# Patient Record
Sex: FEMALE | Race: WHITE | NOT HISPANIC OR LATINO | Employment: OTHER | URBAN - NONMETROPOLITAN AREA
[De-identification: names, ages, dates, MRNs, and addresses within clinical notes are randomized per-mention and may not be internally consistent; named-entity substitution may affect disease eponyms.]

---

## 2017-05-15 ENCOUNTER — HOSPITAL ENCOUNTER (OUTPATIENT)
Dept: LAB | Facility: MEDICAL CENTER | Age: 72
End: 2017-05-15
Attending: FAMILY MEDICINE
Payer: MEDICARE

## 2017-05-15 DIAGNOSIS — E55.9 VITAMIN D DEFICIENCY DISEASE: ICD-10-CM

## 2017-05-15 DIAGNOSIS — D70.9 CHRONIC NEUTROPENIA (HCC): ICD-10-CM

## 2017-05-15 DIAGNOSIS — E78.00 PURE HYPERCHOLESTEROLEMIA: Chronic | ICD-10-CM

## 2017-05-15 LAB
25(OH)D3 SERPL-MCNC: 49 NG/ML (ref 30–100)
ALBUMIN SERPL BCP-MCNC: 4 G/DL (ref 3.2–4.9)
ALBUMIN/GLOB SERPL: 1.3 G/DL
ALP SERPL-CCNC: 72 U/L (ref 30–99)
ALT SERPL-CCNC: 15 U/L (ref 2–50)
ANION GAP SERPL CALC-SCNC: 7 MMOL/L (ref 0–11.9)
AST SERPL-CCNC: 15 U/L (ref 12–45)
BASOPHILS # BLD AUTO: 0.9 % (ref 0–1.8)
BASOPHILS # BLD: 0.04 K/UL (ref 0–0.12)
BILIRUB SERPL-MCNC: 0.6 MG/DL (ref 0.1–1.5)
BUN SERPL-MCNC: 19 MG/DL (ref 8–22)
CALCIUM SERPL-MCNC: 9.8 MG/DL (ref 8.5–10.5)
CHLORIDE SERPL-SCNC: 104 MMOL/L (ref 96–112)
CHOLEST SERPL-MCNC: 186 MG/DL (ref 100–199)
CO2 SERPL-SCNC: 27 MMOL/L (ref 20–33)
CREAT SERPL-MCNC: 0.77 MG/DL (ref 0.5–1.4)
EOSINOPHIL # BLD AUTO: 0.1 K/UL (ref 0–0.51)
EOSINOPHIL NFR BLD: 2.3 % (ref 0–6.9)
ERYTHROCYTE [DISTWIDTH] IN BLOOD BY AUTOMATED COUNT: 48.9 FL (ref 35.9–50)
GFR SERPL CREATININE-BSD FRML MDRD: >60 ML/MIN/1.73 M 2
GLOBULIN SER CALC-MCNC: 3.1 G/DL (ref 1.9–3.5)
GLUCOSE SERPL-MCNC: 95 MG/DL (ref 65–99)
HCT VFR BLD AUTO: 39.4 % (ref 37–47)
HDLC SERPL-MCNC: 89 MG/DL
HGB BLD-MCNC: 12.9 G/DL (ref 12–16)
IMM GRANULOCYTES # BLD AUTO: 0.01 K/UL (ref 0–0.11)
IMM GRANULOCYTES NFR BLD AUTO: 0.2 % (ref 0–0.9)
LDLC SERPL CALC-MCNC: 83 MG/DL
LYMPHOCYTES # BLD AUTO: 1.77 K/UL (ref 1–4.8)
LYMPHOCYTES NFR BLD: 40.8 % (ref 22–41)
MCH RBC QN AUTO: 31.5 PG (ref 27–33)
MCHC RBC AUTO-ENTMCNC: 32.7 G/DL (ref 33.6–35)
MCV RBC AUTO: 96.1 FL (ref 81.4–97.8)
MONOCYTES # BLD AUTO: 0.26 K/UL (ref 0–0.85)
MONOCYTES NFR BLD AUTO: 6 % (ref 0–13.4)
NEUTROPHILS # BLD AUTO: 2.16 K/UL (ref 2–7.15)
NEUTROPHILS NFR BLD: 49.8 % (ref 44–72)
NRBC # BLD AUTO: 0 K/UL
NRBC BLD AUTO-RTO: 0 /100 WBC
PLATELET # BLD AUTO: 166 K/UL (ref 164–446)
PMV BLD AUTO: 10.5 FL (ref 9–12.9)
POTASSIUM SERPL-SCNC: 4.1 MMOL/L (ref 3.6–5.5)
PROT SERPL-MCNC: 7.1 G/DL (ref 6–8.2)
RBC # BLD AUTO: 4.1 M/UL (ref 4.2–5.4)
SODIUM SERPL-SCNC: 138 MMOL/L (ref 135–145)
TRIGL SERPL-MCNC: 70 MG/DL (ref 0–149)
WBC # BLD AUTO: 4.3 K/UL (ref 4.8–10.8)

## 2017-05-15 PROCEDURE — 85025 COMPLETE CBC W/AUTO DIFF WBC: CPT

## 2017-05-15 PROCEDURE — 80061 LIPID PANEL: CPT

## 2017-05-15 PROCEDURE — 36415 COLL VENOUS BLD VENIPUNCTURE: CPT

## 2017-05-15 PROCEDURE — 82306 VITAMIN D 25 HYDROXY: CPT

## 2017-05-15 PROCEDURE — 80053 COMPREHEN METABOLIC PANEL: CPT

## 2017-05-19 ENCOUNTER — GYNECOLOGY VISIT (OUTPATIENT)
Dept: OBGYN | Facility: MEDICAL CENTER | Age: 72
End: 2017-05-19
Payer: MEDICARE

## 2017-05-19 ENCOUNTER — PATIENT MESSAGE (OUTPATIENT)
Dept: MEDICAL GROUP | Facility: PHYSICIAN GROUP | Age: 72
End: 2017-05-19

## 2017-05-19 ENCOUNTER — OFFICE VISIT (OUTPATIENT)
Dept: MEDICAL GROUP | Facility: PHYSICIAN GROUP | Age: 72
End: 2017-05-19
Payer: MEDICARE

## 2017-05-19 VITALS
RESPIRATION RATE: 14 BRPM | HEIGHT: 65 IN | TEMPERATURE: 97.8 F | BODY MASS INDEX: 26.16 KG/M2 | DIASTOLIC BLOOD PRESSURE: 84 MMHG | HEART RATE: 71 BPM | SYSTOLIC BLOOD PRESSURE: 130 MMHG | WEIGHT: 157 LBS | OXYGEN SATURATION: 97 %

## 2017-05-19 VITALS
BODY MASS INDEX: 26.82 KG/M2 | SYSTOLIC BLOOD PRESSURE: 146 MMHG | HEIGHT: 65 IN | WEIGHT: 161 LBS | DIASTOLIC BLOOD PRESSURE: 90 MMHG

## 2017-05-19 DIAGNOSIS — R19.7 DIARRHEA FOLLOWING GASTROINTESTINAL SURGERY: ICD-10-CM

## 2017-05-19 DIAGNOSIS — N95.2 VAGINAL ATROPHY: ICD-10-CM

## 2017-05-19 DIAGNOSIS — Z98.890 DIARRHEA FOLLOWING GASTROINTESTINAL SURGERY: ICD-10-CM

## 2017-05-19 DIAGNOSIS — E78.00 PURE HYPERCHOLESTEROLEMIA: Chronic | ICD-10-CM

## 2017-05-19 DIAGNOSIS — Z12.39 BREAST SCREENING: ICD-10-CM

## 2017-05-19 DIAGNOSIS — J30.1 SEASONAL ALLERGIC RHINITIS DUE TO POLLEN: ICD-10-CM

## 2017-05-19 DIAGNOSIS — N39.3 FEMALE STRESS INCONTINENCE: ICD-10-CM

## 2017-05-19 DIAGNOSIS — Z01.419 WELL WOMAN EXAM: ICD-10-CM

## 2017-05-19 DIAGNOSIS — D70.9 CHRONIC NEUTROPENIA (HCC): ICD-10-CM

## 2017-05-19 DIAGNOSIS — E55.9 VITAMIN D DEFICIENCY DISEASE: ICD-10-CM

## 2017-05-19 PROCEDURE — G8432 DEP SCR NOT DOC, RNG: HCPCS | Performed by: OBSTETRICS & GYNECOLOGY

## 2017-05-19 PROCEDURE — G0101 CA SCREEN;PELVIC/BREAST EXAM: HCPCS | Performed by: OBSTETRICS & GYNECOLOGY

## 2017-05-19 PROCEDURE — 99214 OFFICE O/P EST MOD 30 MIN: CPT | Performed by: FAMILY MEDICINE

## 2017-05-19 PROCEDURE — G8419 CALC BMI OUT NRM PARAM NOF/U: HCPCS | Performed by: OBSTETRICS & GYNECOLOGY

## 2017-05-19 ASSESSMENT — PATIENT HEALTH QUESTIONNAIRE - PHQ9: CLINICAL INTERPRETATION OF PHQ2 SCORE: 0

## 2017-05-19 NOTE — MR AVS SNAPSHOT
"        Yajaira Eduardo   2017 1:45 PM   Gynecology Visit   MRN: [id number]    Department:  [facility]   Dept Phone:  [phone]    Description:  Female : 1945   Provider:  Rufina Latham M.D.           Reason for Visit     Gynecologic Exam annual exam       Allergies as of 2017     Allergen Noted Reactions    Other Environmental 2013   Runny Nose    hayfever-runny nose      You were diagnosed with     Well woman exam   [778821]       Breast screening   [971873]         Vital Signs     Blood Pressure Height Weight Body Mass Index Smoking Status       146/90 mmHg 1.651 m (5' 5\") 73.029 kg (161 lb) 26.79 kg/m2 Former Smoker       Basic Information     Date Of Birth Sex Race Ethnicity Preferred Language    1945 Female White Non- English      Your appointments     Oct 20, 2017  9:20 AM   Established Patient with Jaquelin Rojas M.D.   [address] [address] [phone]           You will be receiving a confirmation call a few days before your appointment from our automated call confirmation system.              Problem List              ICD-10-CM Priority Class Noted - Resolved    Postmenopausal atrophic vaginitis (Chronic) N95.2   2010 - Present    Pure hypercholesterolemia (Chronic) E78.00   2010 - Present    Vitamin D deficiency disease E55.9   2010 - Present    Diarrhea following gastrointestinal surgery R19.7, Z98.890   2010 - Present    Chronic neutropenia (CMS-[facility], located within [facility]) D70.9   10/26/2010 - Present    Female stress incontinence N39.3   2013 - Present    Seasonal allergies J30.2   2014 - Present      Health Maintenance        Date Due Completion Dates    MAMMOGRAM 10/20/2017 10/20/2016, 10/14/2015, 10/13/2014, 2013, 2012, 10/13/2011, 10/25/2010, 10/23/2009, 10/23/2009, 2009, 2009, 10/22/2008, 10/22/2008, 10/22/2007, 10/22/2007, 2006, " 9/28/2005, 9/29/2004    BONE DENSITY 10/22/2019 10/22/2014    IMM DTaP/Tdap/Td Vaccine (2 - Td) 9/4/2024 9/4/2014    COLONOSCOPY 10/11/2026 10/11/2016            Current Immunizations     13-VALENT PCV PREVNAR 10/23/2015    Influenza TIV (IM) 10/14/2013    Influenza Vaccine Adult HD 10/15/2016    Influenza Vaccine Quad Inj (Pf) 10/17/2014 11:00 AM    Pneumococcal Vaccine (UF)Historical Data 1/2/2001    Pneumococcal polysaccharide vaccine (PPSV-23) 5/25/2012  9:37 AM    SHINGLES VACCINE 7/9/2012    Tdap Vaccine 9/4/2014 12:49 PM    Tetanus Vaccine 6/1/2005      Below and/or attached are the medications your provider expects you to take. Review all of your home medications and newly ordered medications with your provider and/or pharmacist. Follow medication instructions as directed by your provider and/or pharmacist. Please keep your medication list with you and share with your provider. Update the information when medications are discontinued, doses are changed, or new medications (including over-the-counter products) are added; and carry medication information at all times in the event of emergency situations     Allergies:  OTHER ENVIRONMENTAL - Runny Nose               Medications  Valid as of: May 19, 2017 -  1:51 PM    Generic Name Brand Name Tablet Size Instructions for use    Ascorbic Acid (Tab) ascorbic acid 500 MG Take 500 mg by mouth every day.        Calcium Carb-Cholecalciferol   Take 1 Tab by mouth 2 Times a Day.        Cholecalciferol (Tab) cholecalciferol 1000 UNIT Take 6,000 Units by mouth every day.        Cholestyramine (Powder) QUESTRAN 4 GM/DOSE Take  by mouth.        Estrogens, Conjugated (Cream) PREMARIN 0.625 MG/GM As directed        Flaxseed (Linseed) (Cap) Flaxseed Oil 1200 MG Take  by mouth.        Glucosamine-Chondroit-Vit C-Mn   Take  by mouth.        Multiple Vitamins-Minerals   Take  by mouth.        .                 Medicines prescribed today were sent to:     [facility] -  [place], [place] - [address]    [address] [place] [address]    Phone: [phone] Fax: [phone]    Open 24 Hours?: No      Medication refill instructions:       If your prescription bottle indicates you have medication refills left, it is not necessary to call your provider’s office. Please contact your pharmacy and they will refill your medication.    If your prescription bottle indicates you do not have any refills left, you may request refills at any time through one of the following ways: The online [employer] [facility] system (except Urgent Care), by calling your provider’s office, or by asking your pharmacy to contact your provider’s office with a refill request. Medication refills are processed only during regular business hours and may not be available until the next business day. Your provider may request additional information or to have a follow-up visit with you prior to refilling your medication.   *Please Note: Medication refills are assigned a new Rx number when refilled electronically. Your pharmacy may indicate that no refills were authorized even though a new prescription for the same medication is available at the pharmacy. Please request the medicine by name with the pharmacy before contacting your provider for a refill.        Your To Do List     Future Labs/Procedures Complete By Expires    MA-SCREEN MAMMO W/CAD-BILAT  As directed 6/18/2018      Other Notes About Your Plan     LABS: 10/19/10: Vitamin D: 44, WBC: 4.0           [employer] Access Code: Activation code not generated  Current [facility] Status: Active

## 2017-05-19 NOTE — MR AVS SNAPSHOT
"        Yajaira Eduardo   2017 10:40 AM   Office Visit   MRN: [id number]    Department:  [facility]   Dept Phone:  [phone]    Description:  Female : 1945   Provider:  Jaquelin Rojas M.D.           Reason for Visit     Annual Exam           Allergies as of 2017     Allergen Noted Reactions    Other Environmental 2013   Runny Nose    hayfever-runny nose      Vital Signs     Blood Pressure Pulse Temperature Respirations Height Weight    130/84 mmHg 71 36.6 °C (97.8 °F) 14 1.651 m (5' 5\") 71.215 kg (157 lb)    Body Mass Index Oxygen Saturation Smoking Status             26.13 kg/m2 97% Former Smoker         Basic Information     Date Of Birth Sex Race Ethnicity Preferred Language    1945 Female White Non- English      Your appointments     May 19, 2017  1:45 PM   Annual Exam with Rufina Latham M.D.   [facility]    [address]  [facility] [address] [phone]            Oct 20, 2017  9:20 AM   Established Patient with Jaquelin Rojas M.D.   [place] [id number] [facility] [address] [phone]           You will be receiving a confirmation call a few days before your appointment from our automated call confirmation system.              Problem List              ICD-10-CM Priority Class Noted - Resolved    Postmenopausal atrophic vaginitis (Chronic) N95.2   2010 - Present    Pure hypercholesterolemia (Chronic) E78.00   2010 - Present    Vitamin D deficiency disease E55.9   2010 - Present    Diarrhea following gastrointestinal surgery R19.7, Z98.890   2010 - Present    Chronic neutropenia (CMS-HCC) D70.9   10/26/2010 - Present    Female stress incontinence N39.3   2013 - Present    Seasonal allergies J30.2   2014 - Present      Health Maintenance        Date Due Completion Dates    MAMMOGRAM 10/20/2017 " 10/20/2016, 10/14/2015, 10/13/2014, 9/6/2013, 9/5/2012, 10/13/2011, 10/25/2010, 10/23/2009, 10/23/2009, 5/13/2009, 5/13/2009, 10/22/2008, 10/22/2008, 10/22/2007, 10/22/2007, 9/29/2006, 9/28/2005, 9/29/2004    BONE DENSITY 10/22/2019 10/22/2014    IMM DTaP/Tdap/Td Vaccine (2 - Td) 9/4/2024 9/4/2014    COLONOSCOPY 10/11/2026 10/11/2016            Current Immunizations     13-VALENT PCV PREVNAR 10/23/2015    Influenza TIV (IM) 10/14/2013    Influenza Vaccine Adult HD 10/15/2016    Influenza Vaccine Quad Inj (Pf) 10/17/2014 11:00 AM    Pneumococcal Vaccine (UF)Historical Data 1/2/2001    Pneumococcal polysaccharide vaccine (PPSV-23) 5/25/2012  9:37 AM    SHINGLES VACCINE 7/9/2012    Tdap Vaccine 9/4/2014 12:49 PM    Tetanus Vaccine 6/1/2005      Below and/or attached are the medications your provider expects you to take. Review all of your home medications and newly ordered medications with your provider and/or pharmacist. Follow medication instructions as directed by your provider and/or pharmacist. Please keep your medication list with you and share with your provider. Update the information when medications are discontinued, doses are changed, or new medications (including over-the-counter products) are added; and carry medication information at all times in the event of emergency situations     Allergies:  OTHER ENVIRONMENTAL - Runny Nose               Medications  Valid as of: May 19, 2017 - 11:16 AM    Generic Name Brand Name Tablet Size Instructions for use    Ascorbic Acid (Tab) ascorbic acid 500 MG Take 500 mg by mouth every day.        Calcium Carb-Cholecalciferol   Take 1 Tab by mouth 2 Times a Day.        Cholecalciferol (Tab) cholecalciferol 1000 UNIT Take 6,000 Units by mouth every day.        Cholestyramine (Powder) QUESTRAN 4 GM/DOSE Take  by mouth.        Estrogens, Conjugated (Cream) PREMARIN 0.625 MG/GM As directed        Flaxseed (Linseed) (Cap) Flaxseed Oil 1200 MG Take  by mouth.         Glucosamine-Chondroit-Vit C-Mn   Take  by mouth.        Multiple Vitamins-Minerals   Take  by mouth.        .                 Medicines prescribed today were sent to:     [facility] [place], [place] - [address]    [address] [place] [address]    Phone: [phone] Fax: [phone]    Open 24 Hours?: No      Medication refill instructions:       If your prescription bottle indicates you have medication refills left, it is not necessary to call your provider’s office. Please contact your pharmacy and they will refill your medication.    If your prescription bottle indicates you do not have any refills left, you may request refills at any time through one of the following ways: The online [employer] system (except Urgent Care), by calling your provider’s office, or by asking your pharmacy to contact your provider’s office with a refill request. Medication refills are processed only during regular business hours and may not be available until the next business day. Your provider may request additional information or to have a follow-up visit with you prior to refilling your medication.   *Please Note: Medication refills are assigned a new Rx number when refilled electronically. Your pharmacy may indicate that no refills were authorized even though a new prescription for the same medication is available at the pharmacy. Please request the medicine by name with the pharmacy before contacting your provider for a refill.        Other Notes About Your Plan     LABS: 10/19/10: Vitamin D: 44, WBC: 4.0           [employer] Access Code: Activation code not generated  Current [employer] Status: Active

## 2017-05-20 NOTE — PROGRESS NOTES
ANNUAL Gynecologic Exam      HPI Comments:   72 YEAR OLD POSTMENOPAUSE presents for well woman exam.    No LMP recorded. Patient has had a hysterectomy.  She had hysterectomy and bladder repair  Doing well  (+) vaginal dryness. She quit taking premarin cream since she read that it can cause dementia  (+) sexually active, uses lubricant  Active lifestyle  No UI, no ELIN  No family history of gyn cancers    Review of Systems   Pertinent positives documented in HPI and all other systems reviewed & are negative    All PMH, PSH, allergies, social history and FH reviewed and updated today:  Past Medical History   Diagnosis Date   • Cervicalgia      controlled   • Cystocele, midline      uncontrolled   • Abnormal mammogram, unspecified      5/13/09 Diagnostic mammo L breast:  Negative (next 11/13/09)   • Lactose intolerance    • Fracture closed, mandible      after trauma   • S/P colonoscopy      1996: Normal   • Thoracic or lumbosacral neuritis or radiculitis, unspecified      controlled   • OA (osteoarthritis) 4/20/2010   • Postmenopausal atrophic vaginitis 4/26/2010   • Pure hypercholesterolemia 4/26/2010   • Unspecified vitamin D deficiency 4/26/2010   • Diarrhea following gastrointestinal surgery 4/26/2010   • Leukopenia 10/26/2010   • Urinary bladder disorder    • Seasonal and perennial allergic rhinoconjunctivitis      hayfever, no rx     Past Surgical History   Procedure Laterality Date   • Hysterectomy, vaginal       without BSO   • Tonsillectomy     • Bladder sling female     • Emilie by laparoscopy     • Breast biopsy       hx of left breast biopsy-benign   • Colposacropexy robotic  8/22/2013     Performed by Elisa Jones M.D. at [facility]   • Cystoscopy  8/22/2013     Performed by Elisa Jones M.D. at [facility]   • Us-needle core bx-breast panel       Other environmental  Social History     Social History   • Marital Status:      Spouse Name: N/A   • Number of  "Children: N/A   • Years of Education: N/A     Social History Main Topics   • Smoking status: Former Smoker -- 1.00 packs/day for 30 years     Types: Cigarettes     Quit date: 01/01/1992   • Smokeless tobacco: Never Used      Comment: 1992   • Alcohol Use: 7.0 oz/week     14 Glasses of wine per week      Comment: 10-12 weekly wine/natalya.  denies hx of detox   • Drug Use: No   • Sexual Activity:     Partners: Male     Birth Control/ Protection: Post-Menopausal      Comment: , retired     Other Topics Concern   • Exercise Yes     golfs 4 times per week     Social History Narrative    , retired, spends half the year in [place]     Family History   Problem Relation Age of Onset   • Diabetes Father    • Heart Disease Father      Atrial Fib     Medications:   Current Outpatient Prescriptions Ordered in [facility]   Medication Sig Dispense Refill   • cholestyramine (QUESTRAN) 4 GM/DOSE powder Take  by mouth.     • conjugated estrogen (PREMARIN) 0.625 MG/GM Cream As directed 1 Tube 6   • Multiple Vitamins-Minerals (CENTRUM SILVER ADULT 50+ PO) Take  by mouth.     • Flaxseed, Linseed, (FLAXSEED OIL) 1200 MG CAPS Take  by mouth.     • Glucosamine-Chondroit-Vit C-Mn (GLUCOSAMINE CHONDR 1500 COMPLX PO) Take  by mouth.     • ascorbic acid (ASCORBIC ACID) 500 MG TABS Take 500 mg by mouth every day.     • vitamin D (CHOLECALCIFEROL) 1000 UNIT TABS Take 6,000 Units by mouth every day.     • CALCIUM 600 + D PO Take 1 Tab by mouth 2 Times a Day.       No current [facility]-ordered facility-administered medications on file.      Objective:   Vital measurements:  Blood pressure 146/90, height 1.651 m (5' 5\"), weight 73.029 kg (161 lb).  Body mass index is 26.79 kg/(m^2). (Goal BM I>18 <25)    Physical Exam   Nursing note and vitals reviewed.  Constitutional: She is oriented to person, place, and time. She appears well-developed and well-nourished. No distress.     HEENT:   Head: Normocephalic and atraumatic.   Right Ear: " External ear normal.   Left Ear: External ear normal.   Nose: Nose normal.   Eyes: Conjunctivae and EOM are normal. Pupils are equal, round, and reactive to light. No scleral icterus.     Neck: Normal range of motion. Neck supple. No tracheal deviation present. No thyromegaly present.     Pulmonary/Chest: Effort normal and breath sounds normal. No respiratory distress. She has no wheezes. She has no rales. She exhibits no tenderness.     Cardiovascular: Regular, rate and rhythm. No JVD.    Abdominal: Soft. Bowel sounds are normal. She exhibits no distension and no mass. No tenderness. She has no rebound and no guarding.     Breast:  Symmetrical, normal consistency without masses., No dimpling or skin changes, Normal nipples without discharge, negative, No masses    Genitourinary:  Pelvic exam was performed with patient supine. Atrophic, no prolapse  External genitalia with no abnormal pigmentation, labial fusion,rash, tenderness, lesion or injury to the labia bilaterally.  Vagina is with no lesions, foul discharge, erythema, tenderness or bleeding. No foreign body around the vagina or signs of injury.   Cervix absent  BME - no masses, no tenderness    Musculoskeletal: Normal range of motion. She exhibits no edema and no tenderness.     Lymphadenopathy: She has no cervical adenopathy.     Neurological: She is alert and oriented to person, place, and time. She exhibits normal muscle tone.     Skin: Skin is warm and dry. No rash noted. She is not diaphoretic. No erythema. No pallor.     Psychiatric: She has a normal mood and affect. Her behavior is normal. Judgment and thought content normal  Assessment:     1. Well woman exam  MA-SCREEN MAMMO W/CAD-BILAT   2. Breast screening  MA-SCREEN MAMMO W/CAD-BILAT   3. Vaginal atrophy       Plan:   Physical exam performed  Monthly SBE.  Kegels, vaginal moisturizer  Counseling: breast self exam, mammography screening, use and side effects of HRT, menopause, osteoporosis and  adequate intake of calcium and vitamin D  Encourage exercise and proper diet.  Mammograms annually.  See medications and orders placed in encounter report.

## 2017-05-22 RX ORDER — CHOLESTYRAMINE 4 G/9G
4 POWDER, FOR SUSPENSION ORAL DAILY
Qty: 540 G | Refills: 3 | Status: SHIPPED | OUTPATIENT
Start: 2017-05-22 | End: 2017-10-23

## 2017-06-27 ENCOUNTER — HOSPITAL ENCOUNTER (OUTPATIENT)
Dept: RADIOLOGY | Facility: MEDICAL CENTER | Age: 72
End: 2017-06-27
Attending: ORTHOPAEDIC SURGERY
Payer: MEDICARE

## 2017-06-27 DIAGNOSIS — M25.552 LEFT HIP PAIN: ICD-10-CM

## 2017-06-27 PROCEDURE — 73721 MRI JNT OF LWR EXTRE W/O DYE: CPT | Mod: LT

## 2017-06-27 NOTE — ASSESSMENT & PLAN NOTE
This is a chronic health problem that this patient utilizes over-the-counter medication to control when it is problematic.

## 2017-06-27 NOTE — ASSESSMENT & PLAN NOTE
This is a chronic health problem that the patient has been able to manage through dietary changes. Her total cholesterol is 186, triglycerides 70, HDL 89 and her LDL is excellent at 83. She continues to eat a very healthy diet as well as exercise on a regular basis. We will continue to monitor annually.

## 2017-06-27 NOTE — ASSESSMENT & PLAN NOTE
This is a chronic health problem that is quite stable. Patient's most recent CBC shows a white count of 4.3, hemoglobin 12.9, hematocrit 39.4 and platelet count of 166,000 with a normal differential. We will continue to monitor this but as long as she stays stable with her white cell count 4.0 or above there is no reason to return to hematology.

## 2017-06-27 NOTE — ASSESSMENT & PLAN NOTE
This is a chronic health problem that is well controlled with her current vitamin D supplementation. Her vitamin D level is now 49. Goal is to maintain it between 40-80. We will continue to monitor annually.

## 2017-06-27 NOTE — ASSESSMENT & PLAN NOTE
This is chronic health problem that is adequately controlled with her current use of Questran. Patient needs a refill. This will be provided.

## 2017-06-27 NOTE — ASSESSMENT & PLAN NOTE
This is a chronic health problem for this patient that is quite stable. She continues to utilize Premarin cream which has been helpful. She will continue on this long-term.

## 2017-06-27 NOTE — PROGRESS NOTES
Chief Complaint   Patient presents with   • Annual Exam       HISTORY OF PRESENT ILLNESS: Patient is a 72 y.o. female established patient who presents today to follow-up on her chronic health problems listed below.    Health Maintenance: Completed    Chronic neutropenia (CMS-HCC)  This is a chronic health problem that is quite stable. Patient's most recent CBC shows a white count of 4.3, hemoglobin 12.9, hematocrit 39.4 and platelet count of 166,000 with a normal differential. We will continue to monitor this but as long as she stays stable with her white cell count 4.0 or above there is no reason to return to hematology.    Diarrhea following gastrointestinal surgery  This is chronic health problem that is adequately controlled with her current use of Questran. Patient needs a refill. This will be provided.    Female stress incontinence  This is a chronic health problem for this patient that is quite stable. She continues to utilize Premarin cream which has been helpful. She will continue on this long-term.    Pure hypercholesterolemia  This is a chronic health problem that the patient has been able to manage through dietary changes. Her total cholesterol is 186, triglycerides 70, HDL 89 and her LDL is excellent at 83. She continues to eat a very healthy diet as well as exercise on a regular basis. We will continue to monitor annually.    Seasonal allergies  This is a chronic health problem that this patient utilizes over-the-counter medication to control when it is problematic.    Vitamin D deficiency disease  This is a chronic health problem that is well controlled with her current vitamin D supplementation. Her vitamin D level is now 49. Goal is to maintain it between 40-80. We will continue to monitor annually.      Patient Active Problem List    Diagnosis Date Noted   • Seasonal allergies 05/08/2014   • Female stress incontinence 08/22/2013   • Chronic neutropenia (CMS-HCC) 10/26/2010   • Postmenopausal  atrophic vaginitis 04/26/2010   • Pure hypercholesterolemia 04/26/2010   • Vitamin D deficiency disease 04/26/2010   • Diarrhea following gastrointestinal surgery 04/26/2010      Allergies:Other environmental    Current Outpatient Prescriptions   Medication Sig Dispense Refill   • conjugated estrogen (PREMARIN) 0.625 MG/GM Cream As directed 1 Tube 6   • Multiple Vitamins-Minerals (CENTRUM SILVER ADULT 50+ PO) Take  by mouth.     • Flaxseed, Linseed, (FLAXSEED OIL) 1200 MG CAPS Take  by mouth.     • Glucosamine-Chondroit-Vit C-Mn (GLUCOSAMINE CHONDR 1500 COMPLX PO) Take  by mouth.     • ascorbic acid (ASCORBIC ACID) 500 MG TABS Take 500 mg by mouth every day.     • vitamin D (CHOLECALCIFEROL) 1000 UNIT TABS Take 6,000 Units by mouth every day.     • CALCIUM 600 + D PO Take 1 Tab by mouth 2 Times a Day.     • cholestyramine (QUESTRAN) 4 GM/DOSE powder Take 4 g by mouth every day. 540 g 3     No current facility-administered medications for this visit.       Social History   Substance Use Topics   • Smoking status: Former Smoker -- 1.00 packs/day for 30 years     Types: Cigarettes     Quit date: 01/01/1992   • Smokeless tobacco: Never Used      Comment: 1992   • Alcohol Use: 7.0 oz/week     14 Glasses of wine per week      Comment: 10-12 weekly wine/natalya.  denies hx of detox     Social History     Social History Narrative    , retired, spends half the year in [facility]       Family History   Problem Relation Age of Onset   • Diabetes Father    • Heart Disease Father      Atrial Fib       Review of Systems:   Constitutional ROS: No unexpected change in weight, No weakness, No fatigue, No unexplained fevers, sweats, or chills  Pulmonary ROS: No chronic cough, sputum, or hemoptysis, No dyspnea on exertion, No wheezing, No shortness of breath, No recent change in breathing  Cardiovascular ROS: No exercise intolerance, No chest pain, No shortness of breath, No diaphoresis, No edema, No palpitations, No  "syncope    Exam:  Blood pressure 130/84, pulse 71, temperature 36.6 °C (97.8 °F), resp. rate 14, height 1.651 m (5' 5\"), weight 71.215 kg (157 lb), SpO2 97 %.  General:  Well nourished, well developed female in NAD  Head is grossly normal.  Neck: Supple without JVD or bruit. Thyroid is not enlarged.  Pulmonary: Clear to ausculation and percussion.  Normal effort. No rales, ronchi, or wheezing.  Cardiovascular: Regular rate and rhythm without murmur. Carotid and radial pulses are intact and equal bilaterally.  Extremities: no clubbing, cyanosis, or edema.  LABS: 5/15/17: Results reviewed and discussed with the patient, questions answered.    Please note that this dictation was created using voice recognition software. I have made every reasonable attempt to correct obvious errors, but I expect that there are errors of grammar and possibly content that I did not discover before finalizing the note.    Assessment/Plan:  1. Chronic neutropenia (CMS-HCC)  Stable, continue to monitor annually.    2. Diarrhea following gastrointestinal surgery  Stable, continue with current dosing of Questran.    3. Female stress incontinence  Controlled with use of estrogen cream vaginally.    4. Pure hypercholesterolemia  Controlled with lifestyle management    5. Seasonal allergic rhinitis due to pollen  Controlled with over-the-counter medications.    6. Vitamin D deficiency disease  Controlled on current replacement dose. Recheck one year.           "

## 2017-10-07 DIAGNOSIS — D70.9 CHRONIC NEUTROPENIA (HCC): ICD-10-CM

## 2017-10-11 ENCOUNTER — HOSPITAL ENCOUNTER (OUTPATIENT)
Dept: LAB | Facility: MEDICAL CENTER | Age: 72
End: 2017-10-11
Attending: FAMILY MEDICINE
Payer: MEDICARE

## 2017-10-11 DIAGNOSIS — R73.9 HYPERGLYCEMIA: ICD-10-CM

## 2017-10-11 DIAGNOSIS — D70.9 CHRONIC NEUTROPENIA (HCC): ICD-10-CM

## 2017-10-11 LAB
ALBUMIN SERPL BCP-MCNC: 4.3 G/DL (ref 3.2–4.9)
ALBUMIN/GLOB SERPL: 1.5 G/DL
ALP SERPL-CCNC: 67 U/L (ref 30–99)
ALT SERPL-CCNC: 21 U/L (ref 2–50)
ANION GAP SERPL CALC-SCNC: 10 MMOL/L (ref 0–11.9)
AST SERPL-CCNC: 16 U/L (ref 12–45)
BASOPHILS # BLD AUTO: 0.2 % (ref 0–1.8)
BASOPHILS # BLD: 0.02 K/UL (ref 0–0.12)
BILIRUB SERPL-MCNC: 0.5 MG/DL (ref 0.1–1.5)
BUN SERPL-MCNC: 18 MG/DL (ref 8–22)
CALCIUM SERPL-MCNC: 9.8 MG/DL (ref 8.5–10.5)
CHLORIDE SERPL-SCNC: 106 MMOL/L (ref 96–112)
CHOLEST SERPL-MCNC: 208 MG/DL (ref 100–199)
CO2 SERPL-SCNC: 23 MMOL/L (ref 20–33)
CREAT SERPL-MCNC: 0.77 MG/DL (ref 0.5–1.4)
EOSINOPHIL # BLD AUTO: 0 K/UL (ref 0–0.51)
EOSINOPHIL NFR BLD: 0 % (ref 0–6.9)
ERYTHROCYTE [DISTWIDTH] IN BLOOD BY AUTOMATED COUNT: 47.9 FL (ref 35.9–50)
GFR SERPL CREATININE-BSD FRML MDRD: >60 ML/MIN/1.73 M 2
GLOBULIN SER CALC-MCNC: 2.8 G/DL (ref 1.9–3.5)
GLUCOSE SERPL-MCNC: 111 MG/DL (ref 65–99)
HCT VFR BLD AUTO: 38 % (ref 37–47)
HDLC SERPL-MCNC: 104 MG/DL
HGB BLD-MCNC: 12.5 G/DL (ref 12–16)
IMM GRANULOCYTES # BLD AUTO: 0.04 K/UL (ref 0–0.11)
IMM GRANULOCYTES NFR BLD AUTO: 0.5 % (ref 0–0.9)
LDLC SERPL CALC-MCNC: 93 MG/DL
LYMPHOCYTES # BLD AUTO: 0.85 K/UL (ref 1–4.8)
LYMPHOCYTES NFR BLD: 9.7 % (ref 22–41)
MCH RBC QN AUTO: 31.9 PG (ref 27–33)
MCHC RBC AUTO-ENTMCNC: 32.9 G/DL (ref 33.6–35)
MCV RBC AUTO: 96.9 FL (ref 81.4–97.8)
MONOCYTES # BLD AUTO: 0.4 K/UL (ref 0–0.85)
MONOCYTES NFR BLD AUTO: 4.6 % (ref 0–13.4)
NEUTROPHILS # BLD AUTO: 7.47 K/UL (ref 2–7.15)
NEUTROPHILS NFR BLD: 85 % (ref 44–72)
NRBC # BLD AUTO: 0 K/UL
NRBC BLD AUTO-RTO: 0 /100 WBC
PLATELET # BLD AUTO: 173 K/UL (ref 164–446)
PMV BLD AUTO: 11.1 FL (ref 9–12.9)
POTASSIUM SERPL-SCNC: 4.1 MMOL/L (ref 3.6–5.5)
PROT SERPL-MCNC: 7.1 G/DL (ref 6–8.2)
RBC # BLD AUTO: 3.92 M/UL (ref 4.2–5.4)
SODIUM SERPL-SCNC: 139 MMOL/L (ref 135–145)
TRIGL SERPL-MCNC: 57 MG/DL (ref 0–149)
WBC # BLD AUTO: 8.8 K/UL (ref 4.8–10.8)

## 2017-10-11 PROCEDURE — 85025 COMPLETE CBC W/AUTO DIFF WBC: CPT

## 2017-10-11 PROCEDURE — 36415 COLL VENOUS BLD VENIPUNCTURE: CPT

## 2017-10-11 PROCEDURE — 80053 COMPREHEN METABOLIC PANEL: CPT

## 2017-10-11 PROCEDURE — 80061 LIPID PANEL: CPT | Mod: GA

## 2017-10-11 NOTE — PROGRESS NOTES
Patient tested her blood sugar on her 's glucometer this morning and it was 132. She would like to have a CMP and lipid panel added to her lap work. This has been done.

## 2017-10-13 ENCOUNTER — APPOINTMENT (OUTPATIENT)
Dept: MEDICAL GROUP | Facility: MEDICAL CENTER | Age: 72
End: 2017-10-13
Payer: MEDICARE

## 2017-10-20 ENCOUNTER — TELEPHONE (OUTPATIENT)
Dept: MEDICAL GROUP | Facility: MEDICAL CENTER | Age: 72
End: 2017-10-20

## 2017-10-20 NOTE — TELEPHONE ENCOUNTER
ESTABLISHED PATIENT PRE-VISIT PLANNING     Note: Patient will not be contacted if there is no indication to call.     1.  Reviewed notes from the last few office visits within the medical group: Yes  05/19/2014  2.  If any orders were placed at last visit or intended to be done for this visit (i.e. 6 mos follow-up), do we have Results/Consult Notes?        •  Labs - Labs ordered, completed on 10/11/2017 and results are in chart.   Note: If patient appointment is for lab review and patient did not complete labs,                check with provider if OK to reschedule patient until labs completed.    3. Is this appointment scheduled as a Hospital Follow-Up? No    4.  Immunizations were updated in Epic using WebIZ?: Yes       •  Web Iz Recommendations: FLU, and TD    5.  Patient is due for the following Health Maintenance Topics:   Health Maintenance Due   Topic Date Due   • Annual Wellness Visit  05/21/2017   • IMM INFLUENZA (1) 09/01/2017   • MAMMOGRAM  SCHEDULED  10/20/2017           6.  Patient was NOT informed to arrive 15 min prior to their scheduled appointment and bring in their medication bottles.

## 2017-10-23 ENCOUNTER — HOSPITAL ENCOUNTER (OUTPATIENT)
Dept: RADIOLOGY | Facility: MEDICAL CENTER | Age: 72
End: 2017-10-23
Attending: OBSTETRICS & GYNECOLOGY
Payer: MEDICARE

## 2017-10-23 ENCOUNTER — OFFICE VISIT (OUTPATIENT)
Dept: MEDICAL GROUP | Facility: MEDICAL CENTER | Age: 72
End: 2017-10-23
Payer: MEDICARE

## 2017-10-23 VITALS
BODY MASS INDEX: 26.04 KG/M2 | RESPIRATION RATE: 16 BRPM | SYSTOLIC BLOOD PRESSURE: 124 MMHG | HEIGHT: 65 IN | WEIGHT: 156.31 LBS | TEMPERATURE: 98.1 F | DIASTOLIC BLOOD PRESSURE: 60 MMHG | OXYGEN SATURATION: 99 % | HEART RATE: 70 BPM

## 2017-10-23 DIAGNOSIS — E78.00 PURE HYPERCHOLESTEROLEMIA: Chronic | ICD-10-CM

## 2017-10-23 DIAGNOSIS — Z12.39 BREAST SCREENING: ICD-10-CM

## 2017-10-23 DIAGNOSIS — Z23 NEEDS FLU SHOT: ICD-10-CM

## 2017-10-23 DIAGNOSIS — R73.02 GLUCOSE INTOLERANCE (IMPAIRED GLUCOSE TOLERANCE): ICD-10-CM

## 2017-10-23 DIAGNOSIS — Z01.419 WELL WOMAN EXAM: ICD-10-CM

## 2017-10-23 DIAGNOSIS — Z12.31 VISIT FOR SCREENING MAMMOGRAM: ICD-10-CM

## 2017-10-23 DIAGNOSIS — D70.9 CHRONIC NEUTROPENIA (HCC): ICD-10-CM

## 2017-10-23 PROCEDURE — 90662 IIV NO PRSV INCREASED AG IM: CPT | Performed by: FAMILY MEDICINE

## 2017-10-23 PROCEDURE — G0202 SCR MAMMO BI INCL CAD: HCPCS

## 2017-10-23 PROCEDURE — 99214 OFFICE O/P EST MOD 30 MIN: CPT | Mod: 25 | Performed by: FAMILY MEDICINE

## 2017-10-23 PROCEDURE — G0008 ADMIN INFLUENZA VIRUS VAC: HCPCS | Performed by: FAMILY MEDICINE

## 2017-10-23 NOTE — ASSESSMENT & PLAN NOTE
This is a new problem for this patient that was discovered on her last set of labs which was done 10/11/17. At that time her glucose is 111. Her  is a diabetic and checks his blood sugar regularly. Her blood sugars are typically in the low 100s but she has had some readings at times they can go up as high as 130. We will just continue to follow this.

## 2017-10-23 NOTE — ASSESSMENT & PLAN NOTE
In years past this is been an acute problem for this patient. Her blood work typically shows her white cell count to be at the L4 level her most recent lab work done 10/11/17 showed her white cell count to be 8.8 hemoglobin 12.5, hematocrit 38.0 and platelet count was 173,000. Her absolute neutrophil count was slightly elevated at 7.47 with the upper limits of normal being 7.15. Patient possibly had recently been ill with an illness that had been diagnosed as benign positional vertigo. That has not persisted. We will continue to check this every 6 months. [place]

## 2017-10-23 NOTE — ASSESSMENT & PLAN NOTE
This is a chronic health problem for this patient that shows that her total cholesterol 208, triglycerides good at 57, HDL fabulous at 104 and LDL is good at 93. We will continue to run these numbers annually. Patient does try to be careful with her diet and exercise on a regular basis.

## 2017-11-22 ENCOUNTER — PATIENT MESSAGE (OUTPATIENT)
Dept: MEDICAL GROUP | Facility: MEDICAL CENTER | Age: 72
End: 2017-11-22

## 2017-11-22 DIAGNOSIS — R19.7 DIARRHEA FOLLOWING GASTROINTESTINAL SURGERY: ICD-10-CM

## 2017-11-22 DIAGNOSIS — Z98.890 DIARRHEA FOLLOWING GASTROINTESTINAL SURGERY: ICD-10-CM

## 2017-11-22 NOTE — TELEPHONE ENCOUNTER
"From: Yajaira Eduardo  To: Jaquelin Rojas M.D.  Sent: 11/22/2017 11:55 AM PST  Subject: Non-Urgent Medical Question    Dr. STOVALL  Cholestyramine (regular not light) only comes in small cans. Pharmacy suggests increasing prescription to 3 cans per 90 days.Can you send a new prescription to [facility], [address] [place] [address]. [address] [place], [place] [id number] [phone]  Thank you, Have a wonderful Thanksgiving.  Denisse Eduardo  "

## 2018-03-16 ENCOUNTER — TELEPHONE (OUTPATIENT)
Dept: OBGYN | Facility: CLINIC | Age: 73
End: 2018-03-16

## 2018-03-16 NOTE — TELEPHONE ENCOUNTER
----- Message from Lloyd Garg sent at 3/15/2018  1:44 PM PDT -----  Regarding: Annual exam questions  Contact: [phone]  Pt needs to know how frequently Dr. Mccormack wanted her to have her annuals now.

## 2018-03-16 NOTE — TELEPHONE ENCOUNTER
TC to pt in reguards to future appt's. Pt advised Has recommended yearly exams/mammogram and she is now scheduled for 5/2018.tracy

## 2018-04-26 ENCOUNTER — TELEPHONE (OUTPATIENT)
Dept: MEDICAL GROUP | Facility: MEDICAL CENTER | Age: 73
End: 2018-04-26

## 2018-04-26 NOTE — TELEPHONE ENCOUNTER
Phone Number Called: [phone]    Message: I have called Emerald and left her a message for her to call us back.     Left Message for patient to call back: yes

## 2018-04-26 NOTE — TELEPHONE ENCOUNTER
----- Message from Veronica Rivas sent at 4/26/2018 12:54 PM PDT -----  Just received a voicemail from Emerald Garcia at [phone] in coding regarding this patient. Patient is very concerned that her labs not being covered and Emerald would like a call back from you or Dr. Simmons today if possible. Thank you

## 2018-05-04 DIAGNOSIS — R73.02 GLUCOSE INTOLERANCE (IMPAIRED GLUCOSE TOLERANCE): ICD-10-CM

## 2018-05-09 ENCOUNTER — GYNECOLOGY VISIT (OUTPATIENT)
Dept: OBGYN | Facility: MEDICAL CENTER | Age: 73
End: 2018-05-09
Payer: MEDICARE

## 2018-05-09 VITALS
BODY MASS INDEX: 26.49 KG/M2 | HEIGHT: 65 IN | WEIGHT: 159 LBS | DIASTOLIC BLOOD PRESSURE: 86 MMHG | SYSTOLIC BLOOD PRESSURE: 160 MMHG

## 2018-05-09 DIAGNOSIS — Z01.419 WELL WOMAN EXAM: ICD-10-CM

## 2018-05-09 DIAGNOSIS — Z12.39 BREAST SCREENING: ICD-10-CM

## 2018-05-09 PROCEDURE — G0101 CA SCREEN;PELVIC/BREAST EXAM: HCPCS | Performed by: OBSTETRICS & GYNECOLOGY

## 2018-05-09 NOTE — PROGRESS NOTES
ANNUAL Gynecologic Exam     HPI Comments:  73 YEAR OLD postmenopause presents for well woman exam.   No LMP recorded. Patient has had a hysterectomy. and bladder repair  Doing fine  (+) vaginal dryness- uses lubrication with intercourse  Active lifestyle  Never smoker  No family history of breast/ovarian cancer  Denies ELIN, no constipation    Review of Systems   Pertinent positives documented in HPI and all other systems reviewed & are negative    All PMH, PSH, allergies, social history and FH reviewed and updated today:  Past Medical History:   Diagnosis Date   • Abnormal mammogram, unspecified     5/13/09 Diagnostic mammo L breast:  Negative (next 11/13/09)   • Cervicalgia     controlled   • Cystocele, midline     uncontrolled   • Diarrhea following gastrointestinal surgery 4/26/2010   • Fracture closed, mandible     after trauma   • Glucose intolerance (impaired glucose tolerance) 10/23/2017   • Lactose intolerance    • Leukopenia 10/26/2010   • OA (osteoarthritis) 4/20/2010   • Postmenopausal atrophic vaginitis 4/26/2010   • Pure hypercholesterolemia 4/26/2010   • S/P colonoscopy     1996: Normal   • Seasonal and perennial allergic rhinoconjunctivitis     hayfever, no rx   • Thoracic or lumbosacral neuritis or radiculitis, unspecified     controlled   • Unspecified vitamin D deficiency 4/26/2010   • Urinary bladder disorder      Past Surgical History:   Procedure Laterality Date   • COLPOSACROPEXY ROBOTIC  8/22/2013    Performed by Elisa Jones M.D. at [facility]   • CYSTOSCOPY  8/22/2013    Performed by Elisa Jones M.D. at [facility]   • BLADDER SLING FEMALE     • BREAST BIOPSY      hx of left breast biopsy-benign   • PHU BY LAPAROSCOPY     • HYSTERECTOMY, VAGINAL      without BSO   • TONSILLECTOMY     • US-NEEDLE CORE BX-BREAST PANEL       Other environmental  Social History     Social History   • Marital status:      Spouse name: N/A   • Number of children:  "N/A   • Years of education: N/A     Social History Main Topics   • Smoking status: Former Smoker     Packs/day: 1.00     Years: 30.00     Types: Cigarettes     Quit date: 1/1/1992   • Smokeless tobacco: Never Used      Comment: 1992   • Alcohol use 7.0 oz/week     14 Glasses of wine per week      Comment: 10-12 weekly wine/natalya.  denies hx of detox   • Drug use: No   • Sexual activity: Yes     Partners: Male     Birth control/ protection: Post-Menopausal      Comment: , retired     Other Topics Concern   • Exercise Yes     golfs 4 times per week     Social History Narrative    , retired, spends half the year in [facility]     Family History   Problem Relation Age of Onset   • Diabetes Father    • Heart Disease Father      Atrial Fib     Medications:   Current Outpatient Prescriptions Ordered in [facility]   Medication Sig Dispense Refill   • Cholestyramine Light 4 GM Powder Take 4 g by mouth every day. 3 Can 3   • conjugated estrogen (PREMARIN) 0.625 MG/GM Cream As directed 1 Tube 6   • Multiple Vitamins-Minerals (CENTRUM SILVER ADULT 50+ PO) Take  by mouth.     • Flaxseed, Linseed, (FLAXSEED OIL) 1200 MG CAPS Take  by mouth.     • Glucosamine-Chondroit-Vit C-Mn (GLUCOSAMINE CHONDR 1500 COMPLX PO) Take  by mouth.     • ascorbic acid (ASCORBIC ACID) 500 MG TABS Take 500 mg by mouth every day.     • vitamin D (CHOLECALCIFEROL) 1000 UNIT TABS Take 6,000 Units by mouth every day.     • CALCIUM 600 + D PO Take 1 Tab by mouth 2 Times a Day.       No current [facility]-ordered facility-administered medications on file.           Objective:   Vital measurements:  Blood pressure 160/86, height 1.651 m (5' 5\"), weight 72.1 kg (159 lb).  Body mass index is 26.46 kg/m². (Goal BM I>18 <25)    Physical Exam   Nursing note and vitals reviewed.  Constitutional: She is oriented to person, place, and time. She appears well-developed and well-nourished. No distress.     HEENT:   Head: Normocephalic and atraumatic.   Right Ear: " External ear normal.   Left Ear: External ear normal.   Nose: Nose normal.   Eyes: Conjunctivae and EOM are normal. Pupils are equal, round, and reactive to light. No scleral icterus.     Neck: Normal range of motion. Neck supple. No tracheal deviation present. No thyromegaly present.     Pulmonary/Chest: Effort normal and breath sounds normal. No respiratory distress. She has no wheezes. She has no rales. She exhibits no tenderness.     Cardiovascular: Regular, rate and rhythm. No JVD.    Abdominal: Soft. Bowel sounds are normal. She exhibits no distension and no mass. No tenderness. She has no rebound and no guarding.     Breast:  Symmetrical, normal consistency without masses., No dimpling or skin changes, Normal nipples without discharge, negative, No masses    Genitourinary:  Pelvic exam was performed with patient supine.  External genitalia with no abnormal pigmentation, labial fusion,rash, tenderness, lesion or injury to the labia bilaterally.  Vagina is with no lesions, foul discharge, erythema, tenderness or bleeding. No foreign body around the vagina or signs of injury.   Cervix absent , no masses, no tenderness    Musculoskeletal: Normal range of motion. She exhibits no edema and no tenderness.     Lymphadenopathy: She has no cervical adenopathy.     Neurological: She is alert and oriented to person, place, and time. She exhibits normal muscle tone.     Skin: Skin is warm and dry. No rash noted. She is not diaphoretic. No erythema. No pallor.     Psychiatric: She has a normal mood and affect. Her behavior is normal. Judgment and thought content normal  Assessment:     1. Well woman exam  MA-MAMMO SCREENING BILAT W/CECILIO W/CAD   2. Breast screening  MA-MAMMO SCREENING BILAT W/CECILIO W/CAD     Plan:   Physical exam performed  Monthly SBE.  brianna  Counseling: breast self exam, mammography screening, menopause, osteoporosis and adequate intake of calcium and vitamin D  Encourage exercise and proper  diet.  Mammograms  annually.  See medications and orders placed in encounter report.

## 2018-05-11 ENCOUNTER — HOSPITAL ENCOUNTER (OUTPATIENT)
Dept: LAB | Facility: MEDICAL CENTER | Age: 73
End: 2018-05-11
Attending: FAMILY MEDICINE
Payer: MEDICARE

## 2018-05-11 DIAGNOSIS — R73.02 GLUCOSE INTOLERANCE (IMPAIRED GLUCOSE TOLERANCE): ICD-10-CM

## 2018-05-11 LAB
ALBUMIN SERPL BCP-MCNC: 4.4 G/DL (ref 3.2–4.9)
ALBUMIN/GLOB SERPL: 1.7 G/DL
ALP SERPL-CCNC: 80 U/L (ref 30–99)
ALT SERPL-CCNC: 17 U/L (ref 2–50)
ANION GAP SERPL CALC-SCNC: 8 MMOL/L (ref 0–11.9)
AST SERPL-CCNC: 18 U/L (ref 12–45)
BILIRUB SERPL-MCNC: 0.8 MG/DL (ref 0.1–1.5)
BUN SERPL-MCNC: 20 MG/DL (ref 8–22)
CALCIUM SERPL-MCNC: 10 MG/DL (ref 8.5–10.5)
CHLORIDE SERPL-SCNC: 105 MMOL/L (ref 96–112)
CO2 SERPL-SCNC: 28 MMOL/L (ref 20–33)
CREAT SERPL-MCNC: 0.86 MG/DL (ref 0.5–1.4)
GLOBULIN SER CALC-MCNC: 2.6 G/DL (ref 1.9–3.5)
GLUCOSE SERPL-MCNC: 91 MG/DL (ref 65–99)
POTASSIUM SERPL-SCNC: 4.8 MMOL/L (ref 3.6–5.5)
PROT SERPL-MCNC: 7 G/DL (ref 6–8.2)
SODIUM SERPL-SCNC: 141 MMOL/L (ref 135–145)

## 2018-05-11 PROCEDURE — 80053 COMPREHEN METABOLIC PANEL: CPT

## 2018-05-11 PROCEDURE — 36415 COLL VENOUS BLD VENIPUNCTURE: CPT

## 2018-05-14 ENCOUNTER — TELEPHONE (OUTPATIENT)
Dept: MEDICAL GROUP | Facility: MEDICAL CENTER | Age: 73
End: 2018-05-14

## 2018-05-14 NOTE — TELEPHONE ENCOUNTER
"2. Lab Re-Coding paperwork received from Fax requiring provider signature.     3. All appropriate fields completed by Medical Assistant: Yes    4. Paperwork placed in \"MA to Provider\" folder/basket. Awaiting provider completion/signature.     I have printed out visit notes from 05/19/2017. Are codes correct for labs? Please check.     Thank you.     "

## 2018-05-17 ENCOUNTER — OFFICE VISIT (OUTPATIENT)
Dept: MEDICAL GROUP | Facility: MEDICAL CENTER | Age: 73
End: 2018-05-17
Payer: MEDICARE

## 2018-05-17 VITALS
OXYGEN SATURATION: 98 % | BODY MASS INDEX: 26.74 KG/M2 | SYSTOLIC BLOOD PRESSURE: 130 MMHG | WEIGHT: 160.5 LBS | HEART RATE: 62 BPM | HEIGHT: 65 IN | RESPIRATION RATE: 14 BRPM | DIASTOLIC BLOOD PRESSURE: 88 MMHG | TEMPERATURE: 98.2 F

## 2018-05-17 DIAGNOSIS — R73.02 GLUCOSE INTOLERANCE (IMPAIRED GLUCOSE TOLERANCE): ICD-10-CM

## 2018-05-17 DIAGNOSIS — Z98.890 DIARRHEA FOLLOWING GASTROINTESTINAL SURGERY: ICD-10-CM

## 2018-05-17 DIAGNOSIS — N95.2 POSTMENOPAUSAL ATROPHIC VAGINITIS: Chronic | ICD-10-CM

## 2018-05-17 DIAGNOSIS — R19.7 DIARRHEA FOLLOWING GASTROINTESTINAL SURGERY: ICD-10-CM

## 2018-05-17 DIAGNOSIS — D70.9 CHRONIC NEUTROPENIA (HCC): ICD-10-CM

## 2018-05-17 PROCEDURE — 99214 OFFICE O/P EST MOD 30 MIN: CPT | Performed by: FAMILY MEDICINE

## 2018-05-17 ASSESSMENT — PATIENT HEALTH QUESTIONNAIRE - PHQ9: CLINICAL INTERPRETATION OF PHQ2 SCORE: 0

## 2018-05-17 NOTE — PROGRESS NOTES
CC:Diagnoses of Glucose intolerance (impaired glucose tolerance), Diarrhea following gastrointestinal surgery, Postmenopausal atrophic vaginitis, and Chronic neutropenia (HCC) were pertinent to this visit.      HISTORY OF PRESENT ILLNESS: Patient is a 73 y.o. female established patient who presents today to to follow-up on her chronic health problems as outlined below.  Patient did her blood work prior to the visit.  They are going to be traveling most of the summer as is their norm.  She is doing well overall.    Health Maintenance: Completed    Glucose intolerance (impaired glucose tolerance)  This was a problem that was found back in October 2017. Since that time the patient has done well. She eats an excellent diet and exercises regularly. Her most recent comprehensive metabolic panel done in May of this year shows that her glucose is down to 91. Her whole panel is normal. We will plan to recheck some things for her in October which is when she does her annual wellness visit.    Diarrhea following gastrointestinal surgery  This is a chronic health problem for this patient for which she has to use cholestyramine to keep her stool formed. She previously was utilizing packets which got to be too expensive. She is now getting bulk in the can. She only gets 42 doses per can. Therefore she would need 3 cans every 90 days. I've redone her prescription and sent it with that adjustment.    Postmenopausal atrophic vaginitis  This patient does see her gynecologist who felt that she could stop having pelvic exams and Pap smears.  She had her hysterectomy for completely benign reasons.  I concur.    Chronic neutropenia (CMS-HCC)  Patient's last blood work done in October 2017 showed that her white count was up to 8.8.  We will continue to monitor annually.  We will do blood work again in October.      Patient Active Problem List    Diagnosis Date Noted   • Glucose intolerance (impaired glucose tolerance) 10/23/2017   •  Seasonal allergies 05/08/2014   • Female stress incontinence 08/22/2013   • Chronic neutropenia (HCC) 10/26/2010   • Postmenopausal atrophic vaginitis 04/26/2010   • Pure hypercholesterolemia 04/26/2010   • Vitamin D deficiency disease 04/26/2010   • Diarrhea following gastrointestinal surgery 04/26/2010      Allergies:Other environmental    Current Outpatient Prescriptions   Medication Sig Dispense Refill   • Cholestyramine Light 4 GM Powder Take 4 g by mouth every day. 3 Can 3   • Zoster Vac Recomb Adjuvanted (SHINGRIX) 50 MCG Recon Susp 0.5 mL by Intramuscular route Once for 1 dose. 1 Each 0   • conjugated estrogen (PREMARIN) 0.625 MG/GM Cream As directed 1 Tube 6   • Multiple Vitamins-Minerals (CENTRUM SILVER ADULT 50+ PO) Take  by mouth.     • Flaxseed, Linseed, (FLAXSEED OIL) 1200 MG CAPS Take  by mouth.     • Glucosamine-Chondroit-Vit C-Mn (GLUCOSAMINE CHONDR 1500 COMPLX PO) Take  by mouth.     • ascorbic acid (ASCORBIC ACID) 500 MG TABS Take 500 mg by mouth every day.     • vitamin D (CHOLECALCIFEROL) 1000 UNIT TABS Take 6,000 Units by mouth every day.     • CALCIUM 600 + D PO Take 1 Tab by mouth 2 Times a Day.       No current facility-administered medications for this visit.        Social History   Substance Use Topics   • Smoking status: Former Smoker     Packs/day: 1.00     Years: 30.00     Types: Cigarettes     Quit date: 1/1/1992   • Smokeless tobacco: Never Used      Comment: 1992   • Alcohol use 7.0 oz/week     14 Glasses of wine per week      Comment: 10-12 weekly wine/natalya.  denies hx of detox     Social History     Social History Narrative    , retired, spends half the year in [facility]       Family History   Problem Relation Age of Onset   • Diabetes Father    • Heart Disease Father      Atrial Fib       Review of Systems:      - Constitutional: Negative for fever, chills, unexpected weight change, and fatigue/generalized weakness.     - HEENT: Negative for headaches, vision changes,  "hearing changes, ear pain, ear discharge, rhinorrhea, sinus congestion, sore throat, and neck pain.      - Respiratory: Negative for cough, sputum production, chest congestion, dyspnea, wheezing, and crackles.      - Cardiovascular: Negative for chest pain, palpitations, orthopnea, and bilateral lower extremity edema.     - Gastrointestinal: Negative for heartburn, nausea, vomiting, abdominal pain, hematochezia, melena, diarrhea, constipation, and greasy/foul-smelling stools.     - Genitourinary: Negative for dysuria, polyuria, hematuria, pyuria, urinary urgency, and urinary incontinence.    - Musculoskeletal: Patient is having some left thigh/hip pain secondary to assist located in the left femur.  She is dealing with Dr. Camargo (orthopedics) on this problem     - Skin: Negative for rash, itching, cyanotic skin color change.     - Neurological: Negative for dizziness, tingling, tremors, focal sensory deficit, focal weakness and headaches.     - Endo/Heme/Allergies: Does not bruise/bleed easily.     - Psychiatric/Behavioral: Negative for depression, suicidal/homicidal ideation and memory loss.          - NOTE: All other systems reviewed and are negative, except as in HPI.    Exam: That next Thursday today we have a meeting with her  Blood pressure 130/88, pulse 62, temperature 36.8 °C (98.2 °F), resp. rate 14, height 1.651 m (5' 5\"), weight 72.8 kg (160 lb 7.9 oz), SpO2 98 %, not currently breastfeeding. Body mass index is 26.71 kg/m².    General:  Well nourished, well developed female in NAD  LABS: 5/5/11/18: Results reviewed and discussed with the patient, questions answered.    Patient was seen for 25 minutes face to face of which, 20 minutes was spent counseling regarding the above mentioned problems.  Assessment/Plan:  1. Glucose intolerance (impaired glucose tolerance)  Now controlled, patient has not had another elevated blood sugar.  I cannot explain why it was elevated in October but is doing well " now.    2. Diarrhea following gastrointestinal surgery  Controlled, patient continues to utilize cholestyramine in the can.  She needs 3 cans every 90 days.  We will write that prescription today.    3. Postmenopausal atrophic vaginitis  Patient will continue to just follow with me and stop seeing gynecology    4. Chronic neutropenia (HCC)  We will order a CBC with her next set of labs.  Patient will be doing an annual wellness visit in October.

## 2018-05-17 NOTE — TELEPHONE ENCOUNTER
We have gotten the fax from the lab. I have given it to Dr. Rojas and she has filled it out. I have also included offices notes and have faxed everything off. I will scan it into media when fax comp comes threw.

## 2018-05-17 NOTE — ASSESSMENT & PLAN NOTE
This is a chronic health problem for this patient for which she has to use cholestyramine to keep her stool formed. She previously was utilizing packets which got to be too expensive. She is now getting bulk in the can. She only gets 42 doses per can. Therefore she would need 3 cans every 90 days. I've redone her prescription and sent it with that adjustment.

## 2018-05-17 NOTE — TELEPHONE ENCOUNTER
Paperwork completed, the coding for the lipid panel should have been hypercholesterolemia which was diagnosis code E 78.0.  The other codes were correct for the tests that were ordered.

## 2018-05-17 NOTE — ASSESSMENT & PLAN NOTE
Patient's last blood work done in October 2017 showed that her white count was up to 8.8.  We will continue to monitor annually.  We will do blood work again in October.

## 2018-05-17 NOTE — ASSESSMENT & PLAN NOTE
This patient does see her gynecologist who felt that she could stop having pelvic exams and Pap smears.  She had her hysterectomy for completely benign reasons.  I concur.

## 2018-05-17 NOTE — ASSESSMENT & PLAN NOTE
This was a problem that was found back in October 2017. Since that time the patient has done well. She eats an excellent diet and exercises regularly. Her most recent comprehensive metabolic panel done in May of this year shows that her glucose is down to 91. Her whole panel is normal. We will plan to recheck some things for her in October which is when she does her annual wellness visit.

## 2018-10-10 DIAGNOSIS — E78.00 PURE HYPERCHOLESTEROLEMIA: Chronic | ICD-10-CM

## 2018-10-10 DIAGNOSIS — R73.02 GLUCOSE INTOLERANCE (IMPAIRED GLUCOSE TOLERANCE): ICD-10-CM

## 2018-10-10 DIAGNOSIS — D70.9 CHRONIC NEUTROPENIA (HCC): ICD-10-CM

## 2018-10-11 ENCOUNTER — TELEPHONE (OUTPATIENT)
Dept: MEDICAL GROUP | Facility: MEDICAL CENTER | Age: 73
End: 2018-10-11

## 2018-10-11 NOTE — TELEPHONE ENCOUNTER
ANNUAL WELLNESS VISIT PRE-VISIT PLANNING WITHOUT OUTREACH    1.  Reviewed note from last office visit with PCP: YES    2.  If any orders were placed at last visit, do we have Results/Consult Notes?        •  Labs - Labs ordered, completed on 10/11/2018 and results are in chart.  Note: If patient appointment is for lab review and patient did not complete labs, check with provider if OK to reschedule patient until labs completed.       •  Imaging - Imaging was not ordered at last office visit.       •  Referrals - No referrals were ordered at last office visit.    3.  Immunizations were updated in [facility] using WebIZ?: Yes       •  WebIZ Recommendations: FLU and SHINGRIX (Shingles)        •  Is patient due for Tdap? NO       •  Is patient due for Shingles? NO     4.  Patient is due for the following Health Maintenance Topics:   Health Maintenance Due   Topic Date Due   • IMM ZOSTER VACCINES (2 of 3) 09/03/2012   • Annual Wellness Visit  05/21/2017   • IMM INFLUENZA (1) 09/01/2018           5.  Reviewed/Updated the following with patient:       •   Preferred Pharmacy? YES       •   Preferred Lab? YES       •   Preferred Communication? YES       •   Allergies? YES       •   Medications? YES. Was Abstract Encounter opened and chart updated? YES       •   Social History? YES. Was Abstract Encounter opened and chart updated? YES       •   Family History (document living status of immediate family members and if + hx of  cancer, diabetes, hypertension, hyperlipidemia, heart attack, stroke) YES. Was Abstract Encounter opened and chart updated? YES    6.  Care Team Updated:       •   DME Company (gait device, O2, CPAP, etc.): YES and NO       •   Other Specialists (eye doctor, derm, GYN, cardiology, endo, etc): YES    7.  Patient has the following Care Path diagnoses on Problem List:  NONE    8.  MDX printed and highlighted for Provider? NO    9.  Patient was advised: “This is a free wellness visit. The provider will screen for  medical conditions to help you stay healthy. If you have other concerns to address you may be asked to discuss these at a separate visit or there may be an additional fee.”     10.  Patient was informed to arrive 15 min prior to their scheduled appointment and bring in their medication bottles.

## 2018-10-15 ENCOUNTER — HOSPITAL ENCOUNTER (OUTPATIENT)
Dept: LAB | Facility: MEDICAL CENTER | Age: 73
End: 2018-10-15
Attending: FAMILY MEDICINE
Payer: MEDICARE

## 2018-10-15 DIAGNOSIS — R73.02 GLUCOSE INTOLERANCE (IMPAIRED GLUCOSE TOLERANCE): ICD-10-CM

## 2018-10-15 DIAGNOSIS — D70.9 CHRONIC NEUTROPENIA (HCC): ICD-10-CM

## 2018-10-15 DIAGNOSIS — E78.00 PURE HYPERCHOLESTEROLEMIA: Chronic | ICD-10-CM

## 2018-10-15 LAB
ERYTHROCYTE [DISTWIDTH] IN BLOOD BY AUTOMATED COUNT: 50.2 FL (ref 35.9–50)
HCT VFR BLD AUTO: 37.9 % (ref 37–47)
HGB BLD-MCNC: 12.4 G/DL (ref 12–16)
MCH RBC QN AUTO: 32 PG (ref 27–33)
MCHC RBC AUTO-ENTMCNC: 32.7 G/DL (ref 33.6–35)
MCV RBC AUTO: 97.7 FL (ref 81.4–97.8)
PLATELET # BLD AUTO: 159 K/UL (ref 164–446)
PMV BLD AUTO: 10.7 FL (ref 9–12.9)
RBC # BLD AUTO: 3.88 M/UL (ref 4.2–5.4)
WBC # BLD AUTO: 4.2 K/UL (ref 4.8–10.8)

## 2018-10-15 PROCEDURE — 36415 COLL VENOUS BLD VENIPUNCTURE: CPT

## 2018-10-15 PROCEDURE — 80053 COMPREHEN METABOLIC PANEL: CPT

## 2018-10-15 PROCEDURE — 85027 COMPLETE CBC AUTOMATED: CPT

## 2018-10-15 PROCEDURE — 80061 LIPID PANEL: CPT

## 2018-10-16 LAB
ALBUMIN SERPL BCP-MCNC: 4.4 G/DL (ref 3.2–4.9)
ALBUMIN/GLOB SERPL: 1.6 G/DL
ALP SERPL-CCNC: 72 U/L (ref 30–99)
ALT SERPL-CCNC: 19 U/L (ref 2–50)
ANION GAP SERPL CALC-SCNC: 7 MMOL/L (ref 0–11.9)
AST SERPL-CCNC: 22 U/L (ref 12–45)
BILIRUB SERPL-MCNC: 0.9 MG/DL (ref 0.1–1.5)
BUN SERPL-MCNC: 19 MG/DL (ref 8–22)
CALCIUM SERPL-MCNC: 9.6 MG/DL (ref 8.5–10.5)
CHLORIDE SERPL-SCNC: 106 MMOL/L (ref 96–112)
CHOLEST SERPL-MCNC: 194 MG/DL (ref 100–199)
CO2 SERPL-SCNC: 26 MMOL/L (ref 20–33)
CREAT SERPL-MCNC: 0.8 MG/DL (ref 0.5–1.4)
FASTING STATUS PATIENT QL REPORTED: NORMAL
GLOBULIN SER CALC-MCNC: 2.7 G/DL (ref 1.9–3.5)
GLUCOSE SERPL-MCNC: 91 MG/DL (ref 65–99)
HDLC SERPL-MCNC: 95 MG/DL
LDLC SERPL CALC-MCNC: 84 MG/DL
POTASSIUM SERPL-SCNC: 4.1 MMOL/L (ref 3.6–5.5)
PROT SERPL-MCNC: 7.1 G/DL (ref 6–8.2)
SODIUM SERPL-SCNC: 139 MMOL/L (ref 135–145)
TRIGL SERPL-MCNC: 73 MG/DL (ref 0–149)

## 2018-10-18 ENCOUNTER — OFFICE VISIT (OUTPATIENT)
Dept: MEDICAL GROUP | Facility: MEDICAL CENTER | Age: 73
End: 2018-10-18
Payer: MEDICARE

## 2018-10-18 VITALS
WEIGHT: 159.6 LBS | TEMPERATURE: 98 F | DIASTOLIC BLOOD PRESSURE: 78 MMHG | BODY MASS INDEX: 26.59 KG/M2 | OXYGEN SATURATION: 98 % | SYSTOLIC BLOOD PRESSURE: 126 MMHG | HEIGHT: 65 IN | HEART RATE: 74 BPM

## 2018-10-18 DIAGNOSIS — Z23 NEED FOR VACCINATION: ICD-10-CM

## 2018-10-18 DIAGNOSIS — N95.2 POSTMENOPAUSAL ATROPHIC VAGINITIS: Chronic | ICD-10-CM

## 2018-10-18 DIAGNOSIS — D69.6 THROMBOCYTOPENIA (HCC): ICD-10-CM

## 2018-10-18 DIAGNOSIS — E78.00 PURE HYPERCHOLESTEROLEMIA: Chronic | ICD-10-CM

## 2018-10-18 DIAGNOSIS — R73.02 GLUCOSE INTOLERANCE (IMPAIRED GLUCOSE TOLERANCE): ICD-10-CM

## 2018-10-18 DIAGNOSIS — D70.9 CHRONIC NEUTROPENIA (HCC): ICD-10-CM

## 2018-10-18 DIAGNOSIS — Z98.890 DIARRHEA FOLLOWING GASTROINTESTINAL SURGERY: ICD-10-CM

## 2018-10-18 DIAGNOSIS — R19.7 DIARRHEA FOLLOWING GASTROINTESTINAL SURGERY: ICD-10-CM

## 2018-10-18 PROCEDURE — G0008 ADMIN INFLUENZA VIRUS VAC: HCPCS | Performed by: FAMILY MEDICINE

## 2018-10-18 PROCEDURE — G0439 PPPS, SUBSEQ VISIT: HCPCS | Performed by: FAMILY MEDICINE

## 2018-10-18 PROCEDURE — 90662 IIV NO PRSV INCREASED AG IM: CPT | Performed by: FAMILY MEDICINE

## 2018-10-18 ASSESSMENT — ACTIVITIES OF DAILY LIVING (ADL): BATHING_REQUIRES_ASSISTANCE: 0

## 2018-10-18 ASSESSMENT — ENCOUNTER SYMPTOMS: GENERAL WELL-BEING: GOOD

## 2018-10-18 ASSESSMENT — PATIENT HEALTH QUESTIONNAIRE - PHQ9: CLINICAL INTERPRETATION OF PHQ2 SCORE: 0

## 2018-10-18 ASSESSMENT — PAIN SCALES - GENERAL: PAINLEVEL: 2=MINIMAL-SLIGHT

## 2018-10-18 NOTE — ASSESSMENT & PLAN NOTE
This is a chronic health problem for this patient that one time we treated with intravaginal estrogen cream.  She is now using over-the-counter products and not having any problems.

## 2018-10-18 NOTE — PROGRESS NOTES
Chief Complaint   Patient presents with   • Annual Wellness Visit         HPI:  Yajaira is a 73 y.o. here for Medicare Annual Wellness Visit        Patient Active Problem List    Diagnosis Date Noted   • Glucose intolerance (impaired glucose tolerance) 10/23/2017   • Seasonal allergies 05/08/2014   • Female stress incontinence 08/22/2013   • Chronic neutropenia (HCC) 10/26/2010   • Postmenopausal atrophic vaginitis 04/26/2010   • Pure hypercholesterolemia 04/26/2010   • Vitamin D deficiency disease 04/26/2010   • Diarrhea following gastrointestinal surgery 04/26/2010       Current Outpatient Prescriptions   Medication Sig Dispense Refill   • Cholestyramine Light 4 GM Powder Take 4 g by mouth every day. 3 Can 3   • Multiple Vitamins-Minerals (CENTRUM SILVER ADULT 50+ PO) Take  by mouth.     • Flaxseed, Linseed, (FLAXSEED OIL) 1200 MG CAPS Take  by mouth.     • Glucosamine-Chondroit-Vit C-Mn (GLUCOSAMINE CHONDR 1500 COMPLX PO) Take  by mouth.     • ascorbic acid (ASCORBIC ACID) 500 MG TABS Take 500 mg by mouth every day.     • vitamin D (CHOLECALCIFEROL) 1000 UNIT TABS Take 6,000 Units by mouth every day.     • CALCIUM 600 + D PO Take 1 Tab by mouth 2 Times a Day.     • conjugated estrogen (PREMARIN) 0.625 MG/GM Cream As directed (Patient not taking: Reported on 10/18/2018) 1 Tube 6     No current facility-administered medications for this visit.         Patient is taking medications as noted in medication list.  Current supplements as per medication list.     Allergies: Other environmental    Current social contact/activities: Pt travels with spouse a lot. Pt likes to play cards and goes dancing on Saturday nights while they are living in [place], [place]     Is patient current with immunizations? No, due for FLU and SHINGRIX (Shingles). Patient is interested in receiving FLU today.    She  reports that she quit smoking about 26 years ago. Her smoking use included Cigarettes. She has a 30.00 pack-year smoking history. She has  never used smokeless tobacco. She reports that she drinks about 7.0 oz of alcohol per week . She reports that she does not use drugs.  Counseling given: Not Answered        DPA/Advanced directive: Patient has Advanced Directive on file.     ROS:    Gait: Uses no assistive device   Ostomy: No   Other tubes: No   Amputations: No   Chronic oxygen use No   Last eye exam a year ago   Wears hearing aids: No   : Denies any urinary leakage during the last 6 months      Depression Screening    Little interest or pleasure in doing things?  0 - not at all  Feeling down, depressed, or hopeless? 0 - not at all  Patient Health Questionnaire Score: 0    If depressive symptoms identified deferred to follow up visit unless specifically addressed in assessment and plan.    Interpretation of PHQ-9 Total Score   Score Severity   1-4 No Depression   5-9 Mild Depression   10-14 Moderate Depression   15-19 Moderately Severe Depression   20-27 Severe Depression    Screening for Cognitive Impairment    Three Minute Recall (leader, season, table)  3/3 Estlea, sofian, daughter  Abiodun clock face with all 12 numbers and set the hands to show 10 past 11.  Yes 5/5  If cognitive concerns identified, deferred for follow up unless specifically addressed in assessment and plan.    Fall Risk Assessment    Has the patient had two or more falls in the last year or any fall with injury in the last year?  No  If fall risk identified, deferred for follow up unless specifically addressed in assessment and plan.    Safety Assessment    Throw rugs on floor.  No  Handrails on all stairs.  No  Good lighting in all hallways.  Yes  Difficulty hearing.  No  Patient counseled about all safety risks that were identified.    Functional Assessment ADLs    Are there any barriers preventing you from cooking for yourself or meeting nutritional needs?  No.    Are there any barriers preventing you from driving safely or obtaining transportation?  No.    Are there any  barriers preventing you from using a telephone or calling for help?  No.    Are there any barriers preventing you from shopping?  No.    Are there any barriers preventing you from taking care of your own finances?  No.    Are there any barriers preventing you from managing your medications?  No.    Are there any barriers preventing you from showering, bathing or dressing yourself?  No.    Are you currently engaging in any exercise or physical activity?  Yes.  Pt plays golf twice weekly in the summer and alternates in the summer. Pt rides bike and pt walks daily one mile a day.  What is your perception of your health?  Good.    Health Maintenance Summary                Annual Wellness Visit Overdue 5/21/2017      Done 5/20/2016 Visit Dx: Medicare annual wellness visit, subsequent     Patient has more history with this topic...    IMM ZOSTER VACCINES Overdue 7/13/2018      Done 5/18/2018 Ext Imm: Zoster Arvind (Shingrix)     Patient has more history with this topic...    IMM INFLUENZA Overdue 9/1/2018      Done 10/23/2017 Imm Admin: Influenza Vaccine Adult HD     Patient has more history with this topic...    MAMMOGRAM Next Due 10/23/2018      Done 10/23/2017 MA-MAMMO SCREENING BILAT W/TOMOSYNTHESIS W/CAD     Patient has more history with this topic...    BONE DENSITY Next Due 10/22/2019      Done 10/22/2014 DS-BONE DENSITY STUDY (DEXA)    IMM DTaP/Tdap/Td Vaccine Next Due 9/4/2024      Done 9/4/2014 Imm Admin: Tdap Vaccine    COLONOSCOPY Next Due 10/11/2026      Done 10/11/2016 REFERRAL TO GI FOR COLONOSCOPY          Patient Care Team:  Jaquelin Rojas M.D. as PCP - General  [facility] as Consulting Physician  Jesus Dawson M.D. as Consulting Physician (Ophthalmology)  Refugio Camargo M.D. as Consulting Physician (Orthopaedics)    Social History   Substance Use Topics   • Smoking status: Former Smoker     Packs/day: 1.00     Years: 30.00     Types: Cigarettes     Quit date: 1/1/1992   • Smokeless  "tobacco: Never Used      Comment: 1992   • Alcohol use 7.0 oz/week     14 Glasses of wine per week      Comment: 10-12 weekly wine/natalya.  denies hx of detox     Family History   Problem Relation Age of Onset   • Diabetes Father    • Heart Disease Father         Atrial Fib     She  has a past medical history of Abnormal mammogram, unspecified; Cervicalgia; Cystocele, midline; Diarrhea following gastrointestinal surgery (4/26/2010); Fracture closed, mandible; Glucose intolerance (impaired glucose tolerance) (10/23/2017); Lactose intolerance; Leukopenia (10/26/2010); OA (osteoarthritis) (4/20/2010); Postmenopausal atrophic vaginitis (4/26/2010); Pure hypercholesterolemia (4/26/2010); S/P colonoscopy; Seasonal and perennial allergic rhinoconjunctivitis; Thoracic or lumbosacral neuritis or radiculitis, unspecified; Unspecified vitamin D deficiency (4/26/2010); and Urinary bladder disorder.   Past Surgical History:   Procedure Laterality Date   • COLPOSACROPEXY ROBOTIC  8/22/2013    Performed by Elisa Jones M.D. at SURGERY [facility]   • CYSTOSCOPY  8/22/2013    Performed by Elisa Jones M.D. at SURGERY [facility]   • BLADDER SLING FEMALE     • BREAST BIOPSY      hx of left breast biopsy-benign   • PHU BY LAPAROSCOPY     • HYSTERECTOMY, VAGINAL      without BSO   • TONSILLECTOMY     • US-NEEDLE CORE BX-BREAST PANEL             Exam:     Blood pressure 126/78, pulse 74, temperature 36.7 °C (98 °F), temperature source Temporal, height 1.651 m (5' 5\"), weight 72.4 kg (159 lb 9.6 oz), SpO2 98 %, not currently breastfeeding. Body mass index is 26.56 kg/m².    Hearing excellent.    Dentition good  Alert, oriented in no acute distress.  Eye contact is good, speech goal directed, affect calm      Assessment and Plan. The following treatment and monitoring plan is recommended:    Thrombocytopenia (HCC)  This is a new problem for this patient that was found on blood work.  Patient has had a mildly low " white count for years, but now also has a mildly low platelet count.  She is down to 159,000.  We will recheck this in 6 months when she is on her way back through.    Chronic neutropenia (CMS-HCC)  This is a chronic health problem for this patient that is stable and has been at these numbers for a long time.  Her white count is 4.2 with a normal differential.    Diarrhea following gastrointestinal surgery  This is a chronic health problem well-controlled with current medications.    Glucose intolerance (impaired glucose tolerance)  This was a one-time problem for the patient where she had tested her blood sugar with her 's glucometer was slightly elevated and then we had a slight elevation when she did her blood work.  This is now back down to normal.  This patient works very hard to keep her weight under control and exercises on a daily basis.    Postmenopausal atrophic vaginitis  This is a chronic health problem for this patient that one time we treated with intravaginal estrogen cream.  She is now using over-the-counter products and not having any problems.    Pure hypercholesterolemia  This is a chronic health problem well-controlled with lifestyle.  Patient's current total cholesterol 194, triglycerides 73, HDL 95 and LDL 84        Services suggested: No services needed at this time  Health Care Screening recommendations as per orders if indicated.  Referrals offered: PT/OT/Nutrition counseling/Behavioral Health/Smoking cessation as per orders if indicated.    Discussion today about general wellness and lifestyle habits:    · Prevent falls and reduce trip hazards; Cautioned about securing or removing rugs.  · Have a working fire alarm and carbon monoxide detector;   · Engage in regular physical activity and social activities       Follow-up: in 6 months after [place]

## 2018-10-18 NOTE — ASSESSMENT & PLAN NOTE
This is a chronic health problem for this patient that is stable and has been at these numbers for a long time.  Her white count is 4.2 with a normal differential.

## 2018-10-18 NOTE — ASSESSMENT & PLAN NOTE
This is a new problem for this patient that was found on blood work.  Patient has had a mildly low white count for years, but now also has a mildly low platelet count.  She is down to 159,000.  We will recheck this in 6 months when she is on her way back through.

## 2018-10-18 NOTE — ASSESSMENT & PLAN NOTE
This was a one-time problem for the patient where she had tested her blood sugar with her 's glucometer was slightly elevated and then we had a slight elevation when she did her blood work.  This is now back down to normal.  This patient works very hard to keep her weight under control and exercises on a daily basis.

## 2018-10-18 NOTE — ASSESSMENT & PLAN NOTE
This is a chronic health problem well-controlled with lifestyle.  Patient's current total cholesterol 194, triglycerides 73, HDL 95 and LDL 84

## 2018-10-24 ENCOUNTER — HOSPITAL ENCOUNTER (OUTPATIENT)
Dept: RADIOLOGY | Facility: MEDICAL CENTER | Age: 73
End: 2018-10-24
Attending: OBSTETRICS & GYNECOLOGY
Payer: MEDICARE

## 2018-10-24 DIAGNOSIS — Z12.39 BREAST SCREENING: ICD-10-CM

## 2018-10-24 DIAGNOSIS — Z01.419 WELL WOMAN EXAM: ICD-10-CM

## 2018-10-24 PROCEDURE — 77067 SCR MAMMO BI INCL CAD: CPT

## 2019-04-15 ENCOUNTER — PATIENT MESSAGE (OUTPATIENT)
Dept: MEDICAL GROUP | Facility: MEDICAL CENTER | Age: 74
End: 2019-04-15

## 2019-04-15 DIAGNOSIS — E78.00 HYPERCHOLESTEROLEMIA: ICD-10-CM

## 2019-04-15 DIAGNOSIS — E55.9 VITAMIN D DEFICIENCY: ICD-10-CM

## 2019-04-15 DIAGNOSIS — D70.9 NEUTROPENIA, UNSPECIFIED TYPE (HCC): ICD-10-CM

## 2019-05-13 ENCOUNTER — HOSPITAL ENCOUNTER (OUTPATIENT)
Dept: LAB | Facility: MEDICAL CENTER | Age: 74
End: 2019-05-13
Attending: FAMILY MEDICINE
Payer: MEDICARE

## 2019-05-13 DIAGNOSIS — D70.9 NEUTROPENIA, UNSPECIFIED TYPE (HCC): ICD-10-CM

## 2019-05-13 DIAGNOSIS — E55.9 VITAMIN D DEFICIENCY: ICD-10-CM

## 2019-05-13 DIAGNOSIS — E78.00 HYPERCHOLESTEROLEMIA: ICD-10-CM

## 2019-05-13 LAB
25(OH)D3 SERPL-MCNC: 44 NG/ML (ref 30–100)
BASOPHILS # BLD AUTO: 0.9 % (ref 0–1.8)
BASOPHILS # BLD: 0.04 K/UL (ref 0–0.12)
CHOLEST SERPL-MCNC: 193 MG/DL (ref 100–199)
EOSINOPHIL # BLD AUTO: 0.06 K/UL (ref 0–0.51)
EOSINOPHIL NFR BLD: 1.3 % (ref 0–6.9)
ERYTHROCYTE [DISTWIDTH] IN BLOOD BY AUTOMATED COUNT: 49.7 FL (ref 35.9–50)
FASTING STATUS PATIENT QL REPORTED: NORMAL
HCT VFR BLD AUTO: 40.9 % (ref 37–47)
HDLC SERPL-MCNC: 80 MG/DL
HGB BLD-MCNC: 12.8 G/DL (ref 12–16)
IMM GRANULOCYTES # BLD AUTO: 0 K/UL (ref 0–0.11)
IMM GRANULOCYTES NFR BLD AUTO: 0 % (ref 0–0.9)
LDLC SERPL CALC-MCNC: 97 MG/DL
LYMPHOCYTES # BLD AUTO: 1.7 K/UL (ref 1–4.8)
LYMPHOCYTES NFR BLD: 38 % (ref 22–41)
MCH RBC QN AUTO: 31.1 PG (ref 27–33)
MCHC RBC AUTO-ENTMCNC: 31.3 G/DL (ref 33.6–35)
MCV RBC AUTO: 99.3 FL (ref 81.4–97.8)
MONOCYTES # BLD AUTO: 0.3 K/UL (ref 0–0.85)
MONOCYTES NFR BLD AUTO: 6.7 % (ref 0–13.4)
NEUTROPHILS # BLD AUTO: 2.37 K/UL (ref 2–7.15)
NEUTROPHILS NFR BLD: 53.1 % (ref 44–72)
NRBC # BLD AUTO: 0 K/UL
NRBC BLD-RTO: 0 /100 WBC
PLATELET # BLD AUTO: 148 K/UL (ref 164–446)
PMV BLD AUTO: 11 FL (ref 9–12.9)
RBC # BLD AUTO: 4.12 M/UL (ref 4.2–5.4)
TRIGL SERPL-MCNC: 78 MG/DL (ref 0–149)
WBC # BLD AUTO: 4.5 K/UL (ref 4.8–10.8)

## 2019-05-13 PROCEDURE — 80061 LIPID PANEL: CPT

## 2019-05-13 PROCEDURE — 82306 VITAMIN D 25 HYDROXY: CPT

## 2019-05-13 PROCEDURE — 85025 COMPLETE CBC W/AUTO DIFF WBC: CPT

## 2019-05-13 PROCEDURE — 36415 COLL VENOUS BLD VENIPUNCTURE: CPT

## 2019-05-14 ENCOUNTER — GYNECOLOGY VISIT (OUTPATIENT)
Dept: OBGYN | Facility: MEDICAL CENTER | Age: 74
End: 2019-05-14
Payer: MEDICARE

## 2019-05-14 VITALS
DIASTOLIC BLOOD PRESSURE: 80 MMHG | SYSTOLIC BLOOD PRESSURE: 128 MMHG | WEIGHT: 155 LBS | HEIGHT: 65 IN | BODY MASS INDEX: 25.83 KG/M2

## 2019-05-14 DIAGNOSIS — Z01.419 WELL WOMAN EXAM WITH ROUTINE GYNECOLOGICAL EXAM: ICD-10-CM

## 2019-05-14 DIAGNOSIS — Z12.39 SCREENING FOR BREAST CANCER: ICD-10-CM

## 2019-05-14 PROCEDURE — G0101 CA SCREEN;PELVIC/BREAST EXAM: HCPCS | Performed by: OBSTETRICS & GYNECOLOGY

## 2019-05-14 NOTE — PROGRESS NOTES
Well Woman Exam    CC: well woman exam        HPI: Yajaira Eduardo is a 74 y.o.  female who presents for her Annual Gynecologic Exam  Pt has no complaints today, Reports that she feels like her prolapse is starting to worsen again.  Some heaviness in the vaginal area at times, not bad enough that she feels she needs to consider doing anything about it at this point.    Sexually active with .  Uses over-the-counter lubrication which helps with vaginal discomfort during intercourse    Hx of hyst for VB in her 30s  Hx of l-scope sacrocolpopexy  for prolapse.          Health Maintenance:  Pap: not applicable  Dexa: reports normal DEXA this year  Mammogram: 10/18 normal  Colonoscopy: reports due in 4-5yrs      ROS:  Gen: denies fevers, general concerns  CV/resp: reports no concerns  Breasts: no masses/changes  GI: denies abd pain, GI concerns  : denies irregular vaginal bleeding, discharge, pain, denies urinary complaints  Endo: reports no concerns  Psych: denies concerns about mood, feels well    OB History    Para Term  AB Living   2 2 2     2   SAB TAB Ectopic Molar Multiple Live Births             2      # Outcome Date GA Lbr Hayden/2nd Weight Sex Delivery Anes PTL Lv   2 Term      NORMAL SPONT      1 Term      NORMAL SPONT             GYN Hx:   S/p hyst in 30s  Denies hx of STIs  Denies hx of abnl paps     Past Medical History:   Diagnosis Date   • Abnormal mammogram, unspecified     09 Diagnostic mammo L breast:  Negative (next 09)   • Cervicalgia     controlled   • Cystocele, midline     uncontrolled   • Diarrhea following gastrointestinal surgery 2010   • Fracture closed, mandible     after trauma   • Glucose intolerance (impaired glucose tolerance) 10/23/2017   • Lactose intolerance    • Leukopenia 10/26/2010   • OA (osteoarthritis) 2010   • Postmenopausal atrophic vaginitis 2010   • Pure hypercholesterolemia 2010   • S/P colonoscopy     : Normal    • Seasonal and perennial allergic rhinoconjunctivitis     hayfever, no rx   • Thoracic or lumbosacral neuritis or radiculitis, unspecified     controlled   • Unspecified vitamin D deficiency 4/26/2010   • Urinary bladder disorder        Past Surgical History:   Procedure Laterality Date   • COLPOSACROPEXY ROBOTIC  8/22/2013    Performed by Elisa Jones M.D. at SURGERY [facility]   • CYSTOSCOPY  8/22/2013    Performed by Elisa Jones M.D. at SURGERY [facility]   • BLADDER SLING FEMALE     • BREAST BIOPSY      hx of left breast biopsy-benign   • PHU BY LAPAROSCOPY     • HYSTERECTOMY, VAGINAL      without BSO   • TONSILLECTOMY     • US-NEEDLE CORE BX-BREAST PANEL         Medications:   Current Outpatient Prescriptions Ordered in [facility]   Medication Sig Dispense Refill   • Cholestyramine Light 4 GM Powder Take 4 g by mouth every day. 3 Can 3   • Multiple Vitamins-Minerals (CENTRUM SILVER ADULT 50+ PO) Take  by mouth.     • Flaxseed, Linseed, (FLAXSEED OIL) 1200 MG CAPS Take  by mouth.     • Glucosamine-Chondroit-Vit C-Mn (GLUCOSAMINE CHONDR 1500 COMPLX PO) Take  by mouth.     • ascorbic acid (ASCORBIC ACID) 500 MG TABS Take 500 mg by mouth every day.     • vitamin D (CHOLECALCIFEROL) 1000 UNIT TABS Take 6,000 Units by mouth every day.     • CALCIUM 600 + D PO Take 1 Tab by mouth 2 Times a Day.       No current [facility]-ordered facility-administered medications on file.        Allergies: Other environmental    Social History     Social History   • Marital status:      Spouse name: N/A   • Number of children: N/A   • Years of education: N/A     Social History Main Topics   • Smoking status: Former Smoker     Packs/day: 1.00     Years: 30.00     Types: Cigarettes     Quit date: 1/1/1992   • Smokeless tobacco: Never Used      Comment: 1992   • Alcohol use 7.0 oz/week     14 Glasses of wine per week      Comment: 10-12 weekly wine/natalya.  denies hx of detox   • Drug use: No       Family History  "  Problem Relation Age of Onset   • Cancer Mother    • Diabetes Father    • Heart Disease Father         Atrial Fib     Denies hx of GI/GYN/breast cancers  Cutaneous T cell lymphoma in mother    Physical Exam   /80 (BP Location: Right arm, Patient Position: Sitting)   Ht 1.651 m (5' 5\")   Wt 70.3 kg (155 lb)   LMP  (LMP Unknown)   Breastfeeding? No   BMI 25.79 kg/m²   gen: AAO, NAD, affect appropriate  Neck: non-tender, no masses, no thyromegaly/nodules appreciated  CV: RRR; no LE edema  resp: ctab  Breast: symmetric, no skin changes, right breast lump ectomy scar well-healed and noted, no masses, nontender, no nipple discharge, no lymphadenopathy  abd: soft, NT, ND, no masses, no organomegaly, no hernias  : NEFG, normal urethral meatus, normal anus/perineum, normal vagina, cervix surgically absent.  Uterus surgically absent, no adnexal masses/tenderness  Skin: warm/dry, no lesions    Breast and pelvic exam chaperoned by MA (AB).  Assessment:   74 y.o.  here for well woman exam  1. Well woman exam with routine gynecological exam     2. Screening for breast cancer         Plan:   Pap:not indicated, reviewed pap guidelines  Recommend to return if worsening prolapse, could consider pessary if desired to avoid additional surgery.  Mammograms annually, up to date      Follow up in 1-2 years for WWE or PRN     Betzy Mueller MD  [facility], Women's Health      "

## 2019-05-20 ENCOUNTER — OFFICE VISIT (OUTPATIENT)
Dept: MEDICAL GROUP | Facility: MEDICAL CENTER | Age: 74
End: 2019-05-20
Payer: MEDICARE

## 2019-05-20 VITALS
SYSTOLIC BLOOD PRESSURE: 132 MMHG | RESPIRATION RATE: 14 BRPM | WEIGHT: 157.85 LBS | HEART RATE: 62 BPM | BODY MASS INDEX: 26.3 KG/M2 | TEMPERATURE: 98.2 F | OXYGEN SATURATION: 98 % | DIASTOLIC BLOOD PRESSURE: 78 MMHG | HEIGHT: 65 IN

## 2019-05-20 DIAGNOSIS — E78.00 PURE HYPERCHOLESTEROLEMIA: Chronic | ICD-10-CM

## 2019-05-20 DIAGNOSIS — E04.2 MULTINODULAR GOITER (NONTOXIC): ICD-10-CM

## 2019-05-20 DIAGNOSIS — M50.30 DEGENERATIVE DISC DISEASE, CERVICAL: ICD-10-CM

## 2019-05-20 DIAGNOSIS — F32.1 CURRENT MODERATE EPISODE OF MAJOR DEPRESSIVE DISORDER WITHOUT PRIOR EPISODE (HCC): ICD-10-CM

## 2019-05-20 DIAGNOSIS — D69.6 THROMBOCYTOPENIA (HCC): ICD-10-CM

## 2019-05-20 PROCEDURE — 99215 OFFICE O/P EST HI 40 MIN: CPT | Performed by: FAMILY MEDICINE

## 2019-05-20 RX ORDER — MELOXICAM 15 MG/1
TABLET ORAL
COMMUNITY
Start: 2019-03-05 | End: 2019-06-24

## 2019-05-20 RX ORDER — GABAPENTIN 300 MG/1
CAPSULE ORAL
COMMUNITY
Start: 2019-03-06 | End: 2019-05-20

## 2019-05-20 RX ORDER — DULOXETIN HYDROCHLORIDE 30 MG/1
30 CAPSULE, DELAYED RELEASE ORAL DAILY
Qty: 30 CAP | Refills: 5 | Status: SHIPPED | OUTPATIENT
Start: 2019-05-20 | End: 2019-06-17

## 2019-05-20 ASSESSMENT — PATIENT HEALTH QUESTIONNAIRE - PHQ9
5. POOR APPETITE OR OVEREATING: 1
2. FEELING DOWN, DEPRESSED, IRRITABLE, OR HOPELESS: 1
8. MOVING OR SPEAKING SO SLOWLY THAT OTHER PEOPLE COULD HAVE NOTICED. OR THE OPPOSITE, BEING SO FIGETY OR RESTLESS THAT YOU HAVE BEEN MOVING AROUND A LOT MORE THAN USUAL: 1
6. FEELING BAD ABOUT YOURSELF - OR THAT YOU ARE A FAILURE OR HAVE LET YOURSELF OR YOUR FAMILY DOWN: 1
3. TROUBLE FALLING OR STAYING ASLEEP OR SLEEPING TOO MUCH: 1
1. LITTLE INTEREST OR PLEASURE IN DOING THINGS: 1
SUM OF ALL RESPONSES TO PHQ9 QUESTIONS 1 AND 2: 2
9. THOUGHTS THAT YOU WOULD BE BETTER OFF DEAD, OR OF HURTING YOURSELF: 1
SUM OF ALL RESPONSES TO PHQ QUESTIONS 1-9: 9
7. TROUBLE CONCENTRATING ON THINGS, SUCH AS READING THE NEWSPAPER OR WATCHING TELEVISION: 1
4. FEELING TIRED OR HAVING LITTLE ENERGY: 1

## 2019-05-20 NOTE — ASSESSMENT & PLAN NOTE
This is a new problem for this patient that was found in February 2019.  Patient had some imaging done of her neck and they were concerned about her thyroid so she had both an ultrasound of the thyroid along with thyroid testing.  She does have a multinodular goiter but her TSH is in the normal range.  She is going to follow annually with a provider in [place] so that she has coverage when she is in both places.

## 2019-05-20 NOTE — ASSESSMENT & PLAN NOTE
This is a chronic health problem for this patient for which she is able to manage mainly through diet and exercise.  Her total cholesterol is down to 193, triglycerides 78, HDL excellent at 80 LDL excellent at 97.  She will continue with current lifestyle and cholecyst her mean that she uses because she had her gallbladder removed

## 2019-05-20 NOTE — ASSESSMENT & PLAN NOTE
This is a chronic health problem for this patient that is stable.  Patient repeated her CBC her white count is up to 4.5 and her platelet count is 148,000 which is fairly stable from where she was in 2018 when it was 159,000.  We will continue to follow every 6 months.

## 2019-05-20 NOTE — ASSESSMENT & PLAN NOTE
This is a problem that is been ongoing for this patient for some time.  Over the winter she ended up getting a huge work-up at [facility] and ended up having a MRI of the neck that shows that she has both anterolisthesis as well as degenerative disc disease.  This patient has been living with a low-lying headache on a nearly daily basis until she got placed on gabapentin, meloxicam together for a month and then she finally woke up without a headache.  She then did some physical therapy that was very hands on and made a huge difference for her.  Today she has a headache that set a level 1.  I think she should see Dr. Ventura and have him look at all of the imaging so we can decide if there would be some injection she could do that could help with his chronic headache issue.  Patient is opposed to being on chronic medication if there is any way to avoid it.

## 2019-05-20 NOTE — ASSESSMENT & PLAN NOTE
This is a chronic health problem for this patient that has gotten bad over the winter.  Patient started at the end of the summer up in [place] having some problems and then went down to [place] and had work-ups with ENT, establish a new primary care for when there there as well as a neurosurgeon.  This patient has had a huge work-up that turns out mostly related to a chronic headache on the left side of her neck that she is had going on for some time but it is the chronicity that is made her depressed.  She also has some neck problems and finds that she just feels overwhelmed.  On her PHQ 9 she scored a 9.  We are going to try Cymbalta see if that will help elevate her mood and help with some of her chronic pain issues.  Patient knew she was depressed because she finds herself being irritable and tearful and does not seem to have the ability to handle stress as she has in the past.  Depression Screen (PHQ-2/PHQ-9) 5/17/2018 10/18/2018 5/20/2019   PHQ-2 Total Score - - 2   PHQ-2 Total Score - - -   PHQ-2 Total Score 0 0 -   PHQ-9 Total Score - - 9       Interpretation of PHQ-9 Total Score   Score Severity   1-4 No Depression   5-9 Mild Depression   10-14 Moderate Depression   15-19 Moderately Severe Depression   20-27 Severe Depression

## 2019-05-20 NOTE — PROGRESS NOTES
CC:Diagnoses of Degenerative disc disease, cervical, Current moderate episode of major depressive disorder without prior episode (HCC), Multinodular goiter (nontoxic), Thrombocytopenia (HCC), and Pure hypercholesterolemia were pertinent to this visit.    HISTORY OF PRESENT ILLNESS: Patient is a 74 y.o. female established patient who presents today to talk about what was to have been her annual wellness visit.  Unfortunately the first thing the patient tells the staff is that she is depressed and then that she has had multiple medical problems over the fall and winter.  She needs to discuss these problems.  We will postpone her annual wellness visit.    Health Maintenance: Completed    Degenerative disc disease, cervical  This is a problem that is been ongoing for this patient for some time.  Over the winter she ended up getting a huge work-up at [facility] and ended up having a MRI of the neck that shows that she has both anterolisthesis as well as degenerative disc disease.  This patient has been living with a low-lying headache on a nearly daily basis until she got placed on gabapentin, meloxicam together for a month and then she finally woke up without a headache.  She then did some physical therapy that was very hands on and made a huge difference for her.  Today she has a headache that set a level 1.  I think she should see Dr. Ventura and have him look at all of the imaging so we can decide if there would be some injection she could do that could help with his chronic headache issue.  Patient is opposed to being on chronic medication if there is any way to avoid it.    Current moderate episode of major depressive disorder without prior episode (HCC)  This is a chronic health problem for this patient that has gotten bad over the winter.  Patient started at the end of the summer up in [place] having some problems and then went down to [place] and had work-ups with ENT, establish a new primary care for when there  there as well as a neurosurgeon.  This patient has had a huge work-up that turns out mostly related to a chronic headache on the left side of her neck that she is had going on for some time but it is the chronicity that is made her depressed.  She also has some neck problems and finds that she just feels overwhelmed.  On her PHQ 9 she scored a 9.  We are going to try Cymbalta see if that will help elevate her mood and help with some of her chronic pain issues.  Patient knew she was depressed because she finds herself being irritable and tearful and does not seem to have the ability to handle stress as she has in the past.  Depression Screen (PHQ-2/PHQ-9) 5/17/2018 10/18/2018 5/20/2019   PHQ-2 Total Score - - 2   PHQ-2 Total Score - - -   PHQ-2 Total Score 0 0 -   PHQ-9 Total Score - - 9       Interpretation of PHQ-9 Total Score   Score Severity   1-4 No Depression   5-9 Mild Depression   10-14 Moderate Depression   15-19 Moderately Severe Depression   20-27 Severe Depression      Multinodular goiter (nontoxic)  This is a new problem for this patient that was found in February 2019.  Patient had some imaging done of her neck and they were concerned about her thyroid so she had both an ultrasound of the thyroid along with thyroid testing.  She does have a multinodular goiter but her TSH is in the normal range.  She is going to follow annually with a provider in [place] so that she has coverage when she is in both places.    Thrombocytopenia (HCC)  This is a chronic health problem for this patient that is stable.  Patient repeated her CBC her white count is up to 4.5 and her platelet count is 148,000 which is fairly stable from where she was in 2018 when it was 159,000.  We will continue to follow every 6 months.    Pure hypercholesterolemia  This is a chronic health problem for this patient for which she is able to manage mainly through diet and exercise.  Her total cholesterol is down to 193, triglycerides 78, HDL  excellent at 80 LDL excellent at 97.  She will continue with current lifestyle and cholecyst her mean that she uses because she had her gallbladder removed      Patient Active Problem List    Diagnosis Date Noted   • Degenerative disc disease, cervical 05/20/2019   • Current moderate episode of major depressive disorder without prior episode ([facility]) 05/20/2019   • Multinodular goiter (nontoxic) 05/20/2019   • Thrombocytopenia ([facility]) 10/18/2018   • Glucose intolerance (impaired glucose tolerance) 10/23/2017   • Seasonal allergies 05/08/2014   • Chronic neutropenia ([facility]) 10/26/2010   • Postmenopausal atrophic vaginitis 04/26/2010   • Pure hypercholesterolemia 04/26/2010   • Vitamin D deficiency disease 04/26/2010   • Diarrhea following gastrointestinal surgery 04/26/2010      Allergies:Other environmental    Current Outpatient Prescriptions   Medication Sig Dispense Refill   • DULoxetine (CYMBALTA) 30 MG Cap DR Particles Take 1 Cap by mouth every day. 30 Cap 5   • meloxicam (MOBIC) 15 MG tablet      • Cholestyramine Light 4 GM Powder Take 4 g by mouth every day. 3 Can 3   • Multiple Vitamins-Minerals (CENTRUM SILVER ADULT 50+ PO) Take  by mouth.     • Flaxseed, Linseed, (FLAXSEED OIL) 1200 MG CAPS Take  by mouth.     • Glucosamine-Chondroit-Vit C-Mn (GLUCOSAMINE CHONDR 1500 COMPLX PO) Take  by mouth.     • ascorbic acid (ASCORBIC ACID) 500 MG TABS Take 500 mg by mouth every day.     • vitamin D (CHOLECALCIFEROL) 1000 UNIT TABS Take 6,000 Units by mouth every day.     • CALCIUM 600 + D PO Take 1 Tab by mouth 2 Times a Day.       No current facility-administered medications for this visit.        Social History   Substance Use Topics   • Smoking status: Former Smoker     Packs/day: 1.00     Years: 30.00     Types: Cigarettes     Quit date: 1/1/1992   • Smokeless tobacco: Never Used      Comment: 1992   • Alcohol use 7.0 oz/week     14 Glasses of wine per week      Comment: 10-12 weekly wine/natalya.  denies hx of detox  "    Social History     Social History Narrative    , retired, spends half the year in [facility]       Family History   Problem Relation Age of Onset   • Cancer Mother    • Diabetes Father    • Heart Disease Father         Atrial Fib        ROS:     - Constitutional:  Negative for fever, chills, unexpected weight change, and fatigue/generalized weakness.    - HEENT: Positive for neck pain radiating up into a headache in the left occiput.  Otherwise denies vision changes, hearing changes, ear pain, ear discharge, rhinorrhea, sinus congestion, sore throat.      - Respiratory: Negative for cough, sputum production, chest congestion, dyspnea, wheezing, and crackles.      - Cardiovascular: Negative for chest pain, palpitations, orthopnea, and bilateral lower extremity edema.     - Gastrointestinal: Negative for heartburn, nausea, vomiting, abdominal pain, hematochezia, melena, diarrhea, constipation, and greasy/foul-smelling stools.     - Genitourinary: Negative for dysuria, polyuria, hematuria, pyuria, urinary urgency, and urinary incontinence.     - Musculoskeletal: Negative for myalgias, back pain, and joint pain.     - Skin: Negative for rash, itching, cyanotic skin color change.     - Neurological: Negative for dizziness, tingling, tremors, focal sensory deficit, focal weakness and headaches.     - Endo/Heme/Allergies: Does not bruise/bleed easily.              - NOTE: All other systems reviewed and are negative, except as in HPI.      Exam:    /78 (BP Location: Left arm, Patient Position: Sitting, BP Cuff Size: Adult)   Pulse 62   Temp 36.8 °C (98.2 °F) (Temporal)   Resp 14   Ht 1.651 m (5' 5\")   Wt 71.6 kg (157 lb 13.6 oz)   SpO2 98%  Body mass index is 26.27 kg/m².    General:  Well nourished, well developed female in NAD  Head is grossly normal.  Neck: Supple without JVD or bruit. Thyroid is not enlarged.  Pulmonary: Clear to ausculation and percussion.  Normal effort. No rales, ronchi, or " wheezing.  Cardiovascular: Regular rate and rhythm without murmur. Carotid and radial pulses are intact and equal bilaterally.  Extremities: no clubbing, cyanosis, or edema.  LABS: 5/13/2019: Results reviewed and discussed with the patient, questions answered.  Patient was seen for 40 minutes face to face of which, 35 minutes was spent counseling regarding review of medical events that have gone on over the last 6 months starting with care in [place] and then proceeding to follow care in [place] along with reviewing labs and imaging studies from all these events..        Please note that this dictation was created using voice recognition software. I have made every reasonable attempt to correct obvious errors, but I expect that there are errors of grammar and possibly content that I did not discover before finalizing the note.    Assessment/Plan:  1. Degenerative disc disease, cervical  Uncontrolled, this patient continues to suffer with a headache which more than likely is due to cervical degenerative disc disease.  We will set her up to see Dr. Ventura to discuss the possibility of utilizing injections to help with this.  - REFERRAL TO PHYSIATRY (PMR)    2. Current moderate episode of major depressive disorder without prior episode (HCC)  Uncontrolled, we are going to try duloxetine 30 mg daily to see if that will help both with depression and pain.    3. Multinodular goiter (nontoxic)  This is stable and patient will continue to follow with her provider in [place] who did the initial imaging    4. Thrombocytopenia (HCC)  Stable, patient continues to have a slightly low white count and a slightly low platelet count but these are not progressing.    5. Pure hypercholesterolemia  Adequately controlled through lifestyle management.  We will plan to recheck in 1 year.

## 2019-05-21 ENCOUNTER — PATIENT MESSAGE (OUTPATIENT)
Dept: MEDICAL GROUP | Facility: MEDICAL CENTER | Age: 74
End: 2019-05-21

## 2019-05-21 NOTE — TELEPHONE ENCOUNTER
From: Yajaira Eduardo  To: Jaquelin Rojas M.D.  Sent: 5/21/2019 7:01 AM PDT  Subject: Non-Urgent Medical Question    Dr. STOVALL  In reading the “do and don’t of the medication Duloxetine it says “avoid drinking alcohol “ while taking this drug. I do enjoy having an occasional cocktail before dinner and love my wine with dinner usually 2 glasses?  Do I need to quit? Let me know!  Thank you,  Denisse Eduardo

## 2019-05-24 ENCOUNTER — PATIENT MESSAGE (OUTPATIENT)
Dept: MEDICAL GROUP | Facility: MEDICAL CENTER | Age: 74
End: 2019-05-24

## 2019-06-13 ENCOUNTER — APPOINTMENT (OUTPATIENT)
Dept: PHYSICAL MEDICINE AND REHAB | Facility: MEDICAL CENTER | Age: 74
End: 2019-06-13
Payer: MEDICARE

## 2019-06-17 ENCOUNTER — PATIENT MESSAGE (OUTPATIENT)
Dept: MEDICAL GROUP | Facility: MEDICAL CENTER | Age: 74
End: 2019-06-17

## 2019-06-17 ENCOUNTER — OFFICE VISIT (OUTPATIENT)
Dept: MEDICAL GROUP | Facility: MEDICAL CENTER | Age: 74
End: 2019-06-17
Payer: MEDICARE

## 2019-06-17 VITALS
HEIGHT: 65 IN | HEART RATE: 80 BPM | BODY MASS INDEX: 25.79 KG/M2 | WEIGHT: 154.76 LBS | OXYGEN SATURATION: 97 % | DIASTOLIC BLOOD PRESSURE: 82 MMHG | SYSTOLIC BLOOD PRESSURE: 128 MMHG | TEMPERATURE: 98.2 F | RESPIRATION RATE: 14 BRPM

## 2019-06-17 DIAGNOSIS — F32.1 CURRENT MODERATE EPISODE OF MAJOR DEPRESSIVE DISORDER WITHOUT PRIOR EPISODE (HCC): ICD-10-CM

## 2019-06-17 DIAGNOSIS — M50.30 DEGENERATIVE DISC DISEASE, CERVICAL: ICD-10-CM

## 2019-06-17 PROCEDURE — 99214 OFFICE O/P EST MOD 30 MIN: CPT | Performed by: FAMILY MEDICINE
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
## 2019-06-17 NOTE — ASSESSMENT & PLAN NOTE
This is a chronic health problem for this patient that may be helped with injections.  Patient is going to see Dr. Ventura in the coming week and we will hope that he may be able to provide her some relief.

## 2019-06-17 NOTE — ASSESSMENT & PLAN NOTE
This was a new problem at the patient's last visit where she had identified that she was having problems with depression.  She was feeling tearful and sad most of the time.  We started her on duloxetine 30 mg hoping we might get some pain relief as well.  She is not tearful and sad anymore but she has become ambivalent she states that she cannot get her morning chores done.  She finds her self just wanting to sit.  That is not this patient.  We will try switching her over from duloxetine to sertraline at 50 mg.  Patient also got the side effect of constipation which is been problematic.  And as far as her pain she is getting ready to get a new hip so that will take care of her pain for her.

## 2019-06-18 ENCOUNTER — HOSPITAL ENCOUNTER (OUTPATIENT)
Dept: RADIOLOGY | Facility: MEDICAL CENTER | Age: 74
End: 2019-06-18

## 2019-06-18 ENCOUNTER — PATIENT MESSAGE (OUTPATIENT)
Dept: MEDICAL GROUP | Facility: MEDICAL CENTER | Age: 74
End: 2019-06-18

## 2019-06-18 ENCOUNTER — OFFICE VISIT (OUTPATIENT)
Dept: PHYSICAL MEDICINE AND REHAB | Facility: MEDICAL CENTER | Age: 74
End: 2019-06-18
Payer: MEDICARE

## 2019-06-18 VITALS
DIASTOLIC BLOOD PRESSURE: 80 MMHG | WEIGHT: 154.76 LBS | HEIGHT: 65 IN | SYSTOLIC BLOOD PRESSURE: 114 MMHG | HEART RATE: 77 BPM | TEMPERATURE: 97.9 F | OXYGEN SATURATION: 96 % | BODY MASS INDEX: 25.79 KG/M2

## 2019-06-18 DIAGNOSIS — G44.86 CERVICOGENIC HEADACHE: ICD-10-CM

## 2019-06-18 DIAGNOSIS — M48.02 CERVICAL SPINAL STENOSIS: ICD-10-CM

## 2019-06-18 DIAGNOSIS — M47.812 SPONDYLOSIS OF CERVICAL REGION WITHOUT MYELOPATHY OR RADICULOPATHY: ICD-10-CM

## 2019-06-18 PROCEDURE — 99205 OFFICE O/P NEW HI 60 MIN: CPT | Performed by: PHYSICAL MEDICINE & REHABILITATION

## 2019-06-18 RX ORDER — MAGNESIUM 200 MG
TABLET ORAL
COMMUNITY

## 2019-06-18 RX ORDER — DULOXETIN HYDROCHLORIDE 30 MG/1
30 CAPSULE, DELAYED RELEASE ORAL DAILY
COMMUNITY
End: 2019-06-24

## 2019-06-18 ASSESSMENT — PATIENT HEALTH QUESTIONNAIRE - PHQ9
CLINICAL INTERPRETATION OF PHQ2 SCORE: 1
5. POOR APPETITE OR OVEREATING: 0 - NOT AT ALL
SUM OF ALL RESPONSES TO PHQ QUESTIONS 1-9: 4

## 2019-06-18 ASSESSMENT — PAIN SCALES - GENERAL: PAINLEVEL: 3=SLIGHT PAIN

## 2019-06-18 NOTE — TELEPHONE ENCOUNTER
From: Yajaira Eduardo  To: Jaquelin Rojas M.D.  Sent: 6/17/2019 5:03 PM PDT  Subject: Non-Urgent Medical Question    Dr. STOVALL  I am very concerned after reading the information regarding Sertraline. It’s scaring me, especially since we are leaving in a little over a week and be gone for a month. All the things that “could “ happen. Then I’ll be going in for surgery in August. Some of my concerns, not able to control bladder, very bad headache, may raise chance of broken bones, high chance of eye problems, may affect certain labs test(up coming surgery)bleeding.  65 or older could have more side effects. Of course this is a very small list of effects. My concern also is to be out of the Counrty and have something happen. Also says you can’t stop it , you have to ween your self off of it. Please help! Denisse

## 2019-06-18 NOTE — TELEPHONE ENCOUNTER
From: Yajaira Eduardo  To: Jaquelin Rojas M.D.  Sent: 6/18/2019 1:09 PM PDT  Subject: Non-Urgent Medical Question    Thank you! I will stay on the old prescription till after surgery!  Denisse Eduardo   PS  Saw Dr. Ventura today! Very nice     ----- Message -----  From: Jaquelin Rojas M.D.  Sent: 6/18/2019 1:02 PM PDT  To: Denisse Eduardo  Subject: RE: Non-Urgent Medical Question  Johnnie Salcedo,  If you would prefer to stay on the other until after surgery, that is fine with me. I think you would find a similar set of side effects. I completely agree with your decision either way.  Take care, Dr. Hammer  Let me know what you want to do    ----- Message -----   From: Denisse Eduardo   Sent: 6/17/2019 5:03 PM PDT   To: Jaquelin Rojas M.D.  Subject: Non-Urgent Medical Question    Dr. STOVALL  I am very concerned after reading the information regarding Sertraline. It’s scaring me, especially since we are leaving in a little over a week and be gone for a month. All the things that “could “ happen. Then I’ll be going in for surgery in August. Some of my concerns, not able to control bladder, very bad headache, may raise chance of broken bones, high chance of eye problems, may affect certain labs test(up coming surgery)bleeding.  65 or older could have more side effects. Of course this is a very small list of effects. My concern also is to be out of the Counrty and have something happen. Also says you can’t stop it , you have to ween your self off of it. Please help! Denisse

## 2019-06-18 NOTE — PROGRESS NOTES
New patient note    Physiatry (physical medicine and  Rehabilitation), interventional spine and sports medicine    Date of Service: 6/18/2019    Chief complaint:   Chief Complaint   Patient presents with   • New Patient     Neck pain        HISTORY    HPI: Yajaira Eduardo 74 y.o. female who presents today with Diagnoses of Spondylosis of cervical region without myelopathy or radiculopathy, Cervical spinal stenosis, and Cervicogenic headache were pertinent to this visit.       Chronic left-sided upper neck pain radiating to the head with associated headaches, gradual onset in the beginning, present for the past year, aching in quality, nagging pain, intermittent with flares ranging from 2-10/10 in intensity.  The patient is tried physical therapy for approximately 8 sessions with a home exercise program which she has done for several months.  She is also tried meloxicam and gabapentin and continues to have this pain.     reportedly there was a work up for the neck vasculature as well as an MRI of the brain and work-up with neurology which was essentially negative.    Medical records review:  I reviewed the note from the referring provider Jaquelin Rojas M.D. dated 6/17/2019 and 5/20/2019  As well as other notes from the PCP.  Headaches, neck pain, major depression on duloxetine, nontoxic goiter, thrombocytopenia, hypercholesterolemia which is controlled, glucose intolerance..       ROS:   Red Flags ROS:   Fever, Chills, Sweats: Denies  Involuntary Weight Loss: Denies  Bladder Incontinence: Denies  Bowel Incontinence: denies  Saddle Anesthesia: Denies    All other systems reviewed and negative.       PMHx:   Past Medical History:   Diagnosis Date   • Abnormal mammogram, unspecified     5/13/09 Diagnostic mammo L breast:  Negative (next 11/13/09)   • Cervicalgia     controlled   • Current moderate episode of major depressive disorder without prior episode (HCC) 5/20/2019   • Cystocele, midline     uncontrolled   •  Degenerative disc disease, cervical 5/20/2019   • Diarrhea following gastrointestinal surgery 4/26/2010   • Fracture closed, mandible     after trauma   • Glucose intolerance (impaired glucose tolerance) 10/23/2017   • Lactose intolerance    • Leukopenia 10/26/2010   • Multinodular goiter (nontoxic) 5/20/2019   • OA (osteoarthritis) 4/20/2010   • Postmenopausal atrophic vaginitis 4/26/2010   • Pure hypercholesterolemia 4/26/2010   • S/P colonoscopy     1996: Normal   • Seasonal and perennial allergic rhinoconjunctivitis     hayfever, no rx   • Thoracic or lumbosacral neuritis or radiculitis, unspecified     controlled   • Unspecified vitamin D deficiency 4/26/2010   • Urinary bladder disorder          Current Outpatient Prescriptions on File Prior to Visit   Medication Sig Dispense Refill   • Cholestyramine Light 4 GM Powder Take 4 g by mouth every day. 3 Can 3   • Multiple Vitamins-Minerals (CENTRUM SILVER ADULT 50+ PO) Take  by mouth.     • Flaxseed, Linseed, (FLAXSEED OIL) 1200 MG CAPS Take  by mouth.     • Glucosamine-Chondroit-Vit C-Mn (GLUCOSAMINE CHONDR 1500 COMPLX PO) Take  by mouth.     • ascorbic acid (ASCORBIC ACID) 500 MG TABS Take 500 mg by mouth every day.     • vitamin D (CHOLECALCIFEROL) 1000 UNIT TABS Take 6,000 Units by mouth every day.     • CALCIUM 600 + D PO Take 1 Tab by mouth 2 Times a Day.     • sertraline (ZOLOFT) 50 MG Tab Take 1 Tab by mouth every day. (Patient not taking: Reported on 6/18/2019) 90 Tab 3   • meloxicam (MOBIC) 15 MG tablet        No current facility-administered medications on file prior to visit.         PSHx:   Past Surgical History:   Procedure Laterality Date   • COLPOSACROPEXY ROBOTIC  8/22/2013    Performed by Elisa Jones M.D. at SURGERY [facility]   • CYSTOSCOPY  8/22/2013    Performed by Elisa Jones M.D. at SURGERY [facility]   • BLADDER SLING FEMALE     • BREAST BIOPSY      hx of left breast biopsy-benign   • PHU BY LAPAROSCOPY     •  HYSTERECTOMY, VAGINAL      without BSO   • TONSILLECTOMY     • US-NEEDLE CORE BX-BREAST PANEL         Family history   Family History   Problem Relation Age of Onset   • Cancer Mother    • Diabetes Father    • Heart Disease Father         Atrial Fib         Medications: reviewed on epic.   Outpatient Prescriptions Marked as Taking for the 6/18/19 encounter (Office Visit) with Benson Ventura M.D.   Medication Sig Dispense Refill   • DULoxetine (CYMBALTA) 30 MG Cap DR Particles Take 30 mg by mouth every day.     • Cyanocobalamin (B-12) 1000 MCG SL Tab Place  under tongue.     • non-formulary med Preser Vision     • Cholestyramine Light 4 GM Powder Take 4 g by mouth every day. 3 Can 3   • Multiple Vitamins-Minerals (CENTRUM SILVER ADULT 50+ PO) Take  by mouth.     • Flaxseed, Linseed, (FLAXSEED OIL) 1200 MG CAPS Take  by mouth.     • Glucosamine-Chondroit-Vit C-Mn (GLUCOSAMINE CHONDR 1500 COMPLX PO) Take  by mouth.     • ascorbic acid (ASCORBIC ACID) 500 MG TABS Take 500 mg by mouth every day.     • vitamin D (CHOLECALCIFEROL) 1000 UNIT TABS Take 6,000 Units by mouth every day.     • CALCIUM 600 + D PO Take 1 Tab by mouth 2 Times a Day.          Allergies:   Allergies   Allergen Reactions   • Other Environmental Runny Nose     hayfever-runny nose       Social Hx:   Social History     Social History   • Marital status:      Spouse name: N/A   • Number of children: N/A   • Years of education: N/A     Occupational History   • Not on file.     Social History Main Topics   • Smoking status: Former Smoker     Packs/day: 1.00     Years: 30.00     Types: Cigarettes     Quit date: 1/1/1992   • Smokeless tobacco: Never Used      Comment: 1992   • Alcohol use 7.0 oz/week     14 Glasses of wine per week      Comment: 10-12 weekly wine/natalya.  denies hx of detox   • Drug use: No   • Sexual activity: Yes     Partners: Male     Birth control/ protection: Post-Menopausal      Comment: , retired     Other Topics  "Concern   •  Service No   • Blood Transfusions No   • Caffeine Concern Yes   • Occupational Exposure No   • Hobby Hazards No   • Sleep Concern No   • Stress Concern No   • Weight Concern No   • Special Diet No   • Back Care No   • Exercise Yes     golfs 4 times per week   • Bike Helmet No   • Seat Belt Yes   • Self-Exams No     Social History Narrative    , retired, spends half the year in [facility]         EXAMINATION     Physical Exam:   Vitals: /80 (BP Location: Right arm, Patient Position: Sitting, BP Cuff Size: Adult)   Pulse 77   Temp 36.6 °C (97.9 °F) (Temporal)   Ht 1.651 m (5' 5\")   Wt 70.2 kg (154 lb 12.2 oz)   SpO2 96%     Constitutional:   Body Habitus: Body mass index is 25.75 kg/m².  Cooperation: Fully cooperates with exam  Appearance: Well-groomed, well-nourished, not disheveled     Eyes: No scleral icterus to suggest severe liver disease, no proptosis to suggest severe hyperthyroid    ENT -no obvious auditory deficits, no obvious tongue lesions, tongue midline, no facial droop     Skin -no rashes or lesions noted     Respiratory-  breathing comfortable on room air, no audible wheezing    Cardiovascular- capillary refills less than 2 seconds. No lower extremity edema is noted.     Gastrointestinal - no obvious abdominal masses, No tenderness to palpation in the abdomen    Psychiatric- alert and oriented ×3. Normal affect.     Gait - normal gait, no use of ambulatory device, nonantalgic. the patient can toe walk with ease. the patient can heel walk with ease. balance is appropriate..     Musculoskeletal -   Cervical spine   Inspection: No deformities of the skin over the cervical spine. No rashes or lesions.    decreased A/P ROM in all directions, with pain      Spurling’s sign: negative bilaterally    No signs of muscular atrophy in bilateral upper extremities     Positive TTP left upper cervical facets C2-3 and C3-4, no other tenderness to palpation of the cervical " facets        Neuro       Key points for the international standards for neurological classification of spinal cord injury (ISNCSCI) to light touch.     Dermatome R L   C4 2 2   C5 2 2   C6 2 2   C7 2 2   C8 2 2   T1 2 2   T2 2 2                                         Motor Exam Upper Extremities   ? Myotome R L   Shoulder flexion C5 5 5   Elbow flexion C5 5 5   Wrist extension C6 5 5   Elbow extension C7 5 5   Finger flexion C8 5 5   Finger abduction T1 5 5       Tone on Modified Chaparrita Scale    R L  R L   Shoulder Adduction Negative  Negative       Elbow Flexion Negative  Negative       Elbow Extension Negative  Negative       Wrist Flexion Negative  Negative       Finger Flexion Negative  Negative                         Zavala’s sign negative bilaterally     Reflexes  ?  R L   Biceps  2+ 2+   Brachioradialis  2+ 2+                       MEDICAL DECISION MAKING    Medical records review: see under HPI section.     DATA    Labs:   Lab Results   Component Value Date/Time    SODIUM 139 10/15/2018 09:33 AM    POTASSIUM 4.1 10/15/2018 09:33 AM    CHLORIDE 106 10/15/2018 09:33 AM    CO2 26 10/15/2018 09:33 AM    ANION 7.0 10/15/2018 09:33 AM    GLUCOSE 91 10/15/2018 09:33 AM    BUN 19 10/15/2018 09:33 AM    CREATININE 0.80 10/15/2018 09:33 AM    CREATININE 0.9 10/23/2007 07:36 AM    CALCIUM 9.6 10/15/2018 09:33 AM    ASTSGOT 22 10/15/2018 09:33 AM    ALTSGPT 19 10/15/2018 09:33 AM    TBILIRUBIN 0.9 10/15/2018 09:33 AM    ALBUMIN 4.4 10/15/2018 09:33 AM    TOTPROTEIN 7.1 10/15/2018 09:33 AM    GLOBULIN 2.7 10/15/2018 09:33 AM    AGRATIO 1.6 10/15/2018 09:33 AM   ]    Lab Results   Component Value Date/Time    PROTHROMBTM 12.1 09/02/2008 02:48 PM    INR 1.04 09/02/2008 02:48 PM        Lab Results   Component Value Date/Time    WBC 4.5 (L) 05/13/2019 07:17 AM    WBC 4.4 01/26/2011 07:20 AM    WBC 4.4 01/26/2011 07:20 AM    RBC 4.12 (L) 05/13/2019 07:17 AM    RBC 4.07 01/26/2011 07:20 AM    RBC 4.07 01/26/2011 07:20 AM     HEMOGLOBIN 12.8 05/13/2019 07:17 AM    HEMATOCRIT 40.9 05/13/2019 07:17 AM    MCV 99.3 (H) 05/13/2019 07:17 AM    MCV 94 01/26/2011 07:20 AM    MCV 94 01/26/2011 07:20 AM    MCH 31.1 05/13/2019 07:17 AM    MCH 31.7 01/26/2011 07:20 AM    MCH 31.7 01/26/2011 07:20 AM    MCHC 31.3 (L) 05/13/2019 07:17 AM    MPV 11.0 05/13/2019 07:17 AM    NEUTSPOLYS 53.10 05/13/2019 07:17 AM    LYMPHOCYTES 38.00 05/13/2019 07:17 AM    MONOCYTES 6.70 05/13/2019 07:17 AM    EOSINOPHILS 1.30 05/13/2019 07:17 AM    BASOPHILS 0.90 05/13/2019 07:17 AM        No results found for: HBA1C     Imaging:   I personally reviewed following images, these are my reads  MRI cervical spine from outside study.  Degenerative disc disease worse C5-6 and C6-7.  Facet arthropathy worse on the left starting C2-3 and C3-4.  Also significant facet arthropathy C4-5 and C5-6.  Moderate central canal stenosis C5-6.  Neuroforaminal stenosis C5-6.    IMAGING radiology reads. I reviewed the following radiology reads                                              Results for orders placed during the hospital encounter of 07/12/06   MR-LUMBAR SPINE-WITH & W/O    Impression IMPRESSION:    1. MODERATE CENTRAL CANAL STENOSIS AT THE L2-3 AND L3-4 LEVELS AND TO A   LESSER EXTENT AT THE L4-5 LEVEL SECONDARY TO FACET AND LIGAMENTOUS   HYPERTROPHIC DEGENERATIVE CHANGE.    2. MILD POSTERIOR SPURRING AND RIGHT LATERAL DISK PROTRUSION AT THE L3-4   LEVEL, WHICH EXTENDS INTO THE RIGHT-SIDED NEURAL FORAMINA.   3. MILD POSTERIOR SPURRING AND MILD-TO-MODERATE DISK BULGING AT THE L2-3   LEVEL, WHICH CAUSES MODERATE EFFACEMENT OF THE VENTRAL SURFACE OF THE   THECAL SAC.  4. MILD POSTERIOR SPURRING AND ANNULAR BULGING AT THE L4-5 AND L5-S1   LEVEL WITH MARKED DISK BULGING INTO THE INFERIOR ASPECTS OF THE NEURAL   FORAMINA BILATERALLY, ESPECIALLY ON THE RIGHT AT THIS LEVEL.    AJB/wds       Read By KAMLA SUN MD on Jul 12 2006 11:07AM  : DENITA Transcription Date: Jul  12 2006  4:55PM  THIS DOCUMENT HAS BEEN ELECTRONICALLY SIGNED BY: KAMLA SUN MD on   Jul 12 2006  4:56PM        Results for orders placed during the hospital encounter of 06/11/07   MR-THORACIC SPINE-W/O    Impression IMPRESSION:    1. MULTILEVEL DEGENERATIVE DISK DISEASE AND FACET DEGENERATION WITH   MULTIFOCAL DISK PROTRUSIONS SEEN, AS WELL.  THERE ARE MULTIPLE AREAS OF   CORD CONTACT AND FLATTENING, AS SPECIFICALLY DESCRIBED ABOVE.  THERE IS,   HOWEVER, NO SIGNIFICANT CENTRAL CANAL OR NEURAL FORAMINAL NARROWING SEEN.    2. NO EVIDENCE OF ACUTE OR SUBACUTE COMPRESSION FRACTURE.      JHW/map           Read By ORIN WILEY MD on Jun 11 2007  6:38PM  : MAP Transcription Date: Jun 12 2007  7:49AM  THIS DOCUMENT HAS BEEN ELECTRONICALLY SIGNED BY: ORIN WILEY MD on Jun 12 2007  8:45PM                                                                             Results for orders placed during the hospital encounter of 08/22/13   DX-CHEST-2 VIEWS    Impression No consolidation identified.            INTERPRETING LOCATION: [address] [place] [address], [facility]                  Results for orders placed in visit on 07/09/16   DX-FOOT-COMPLETE 3+ RIGHT    Impression No radiographic evidence of acute displaced fracture or subluxation.    Chronic second ray deformity with severe secondary metatarsal-phalangeal osteoarthritis    Other chronic changes as above          Results for orders placed during the hospital encounter of 06/01/09   DX-HAND 3+    Impression IMPRESSION:     1. NO EVIDENCE OF FRACTURE.     2. DEGENERATIVE CHANGE.     JHW/kit     Read By ORIN WILEY MD on Jun 1 2009  2:48PM  : KIT Transcription Date: Jun 2 2009 12:36PM  THIS DOCUMENT HAS BEEN ELECTRONICALLY SIGNED BY: ORIN WILEY MD on Jun 2 2009  1:31PM                           Results for orders placed during the hospital encounter of 05/31/07   DX-LUMBAR SPINE-2 OR 3 VIEWS    Impression IMPRESSION:    ANTEROLISTHESIS  OF L4 ON L5, NOT SIGNIFICANTLY CHANGED.  LOWER LUMBAR   DISC DISEASE AND FACET JOINT DEGENERATIVE CHANGES, WHICH HAVE NOT   SIGNIFICANTLY CHANGED.            Results for orders placed during the hospital encounter of 06/02/06   DX-LUMBAR SPINE-4+ VIEWS    Impression IMPRESSION:    1. DEGENERATIVE CHANGES INVOLVING THE DISKS AND FACET JOINTS AT L4-5 AND   L5-S1, PARTICULARLY ON THE LEFT AT L4-5.  RECOMMEND FURTHER EVALUATION   WITH MRI.    2. GRADE I ANTEROLISTHESIS OF L4 ON L5.    GAK/map           Read By LULU JHAVERI MD on Jun 2 2006 12:03PM  : MAP Transcription Date: Jun 2 2006  1:53PM  THIS DOCUMENT HAS BEEN ELECTRONICALLY SIGNED BY: LULU JHAVERI MD on   Jun 2 2006  2:14PM                          Results for orders placed during the hospital encounter of 05/31/07   DX-THORACIC SPINE-WITH SWIMMERS VIEW    Impression IMPRESSION:    1. NO COMPRESSION DEFORMITIES IDENTIFIED.  DIFFUSE THORACIC SPURRING AND   LOWER DISC SPACE NARROWING.      W:[facility]           Read By BRYANNA SALDAÑA MD on May 31 2007 12:47PM  : [facility] Transcription Date: May 31 2007  3:28PM  THIS DOCUMENT HAS BEEN ELECTRONICALLY SIGNED BY: BRYANNA SALDAÑA MD on May 31   2007  3:48PM                   Diagnosis   Visit Diagnoses     ICD-10-CM   1. Spondylosis of cervical region without myelopathy or radiculopathy M47.812   2. Cervical spinal stenosis M48.02   3. Cervicogenic headache R51           ASSESSMENT AND PLAN:  Yajaira Eduardo 74 y.o. female      Yajaira was seen today for new patient.    Diagnoses and all orders for this visit:    Cervical spinal stenosis  Comments:  Both central canal and neuroforaminal stenosis however there are no clinical findings of this.  Monitor for now    Cervicogenic headache    Spondylosis of cervical region without myelopathy or radiculopathy  Comments:  Likely the chief pain generator.  Failed conservative treatment with PT and medication management with gabapentin and NSAIDs.     Orders:  -     REFERRAL TO PHYSICIAL MEDICINE REHAB  -     REFERRAL TO PHYSICIAL MEDICINE REHAB    I scheduled the patient for a left C2-3 and C3-4 diagnostic medial branch block.  Plan to be repeat this procedure if the first 1 is effective with significant pain relief.      The risks benefits and alternatives to this procedure were discussed and the patient wishes to proceed with the procedure. Risks include but are not limited to damage to surrounding structures, infection, bleeding, worsening of pain which can be permanent, weakness which can be permanent. Benefits include pain relief, improved function. Alternatives includes not doing the procedure.         Outside records requested:  The patient signed outside records request form for her outside records including outside images [facility] [place] [facility].       Follow-up: After the above procedure        Please note that this dictation was created using voice recognition software. I have made every reasonable attempt to correct obvious errors but there may be errors of grammar and content that I may have overlooked prior to finalization of this note.      Benson Ventura MD  Physical Medicine and Rehabilitation  Interventional Spine and Sports Physiatry  [facility]               Jaquelin Call M.D.

## 2019-06-18 NOTE — PATIENT INSTRUCTIONS
Your procedure will be at the [facility] special procedure suite.    [facility] [place] [address]       PRE-PROCEDURE INSTRUCTIONS  You may take your regular medications except:   · No Anti-inflammatories 5 days prior to your procedure. Anti-inflammatories include medicines such as  ibuprofen (Motrin, Advil), Excedrin, Naproxen (Aleve, Anaprox, Naprelan, Naprosyn), Celecoxib (Celebrex), Diclofenac (Voltaren-XR tab), and Meloxicam (Mobic).   · No Glucophage or Metformin 24 hours before your procedure. You may resume next day after your procedure.  · Call the physiatry office if you are taking or prescribed anti-biotics within five days of procedure.  · Please ask provider if you are taking any new diabetes medication.  · CONTINUE TAKING BLOOD PRESSURE MEDICATIONS AS PRESCRIBED.  · Pain medications will not be prescribed on the procedure day. Procedural pain medication may be used by your provider   · Call your doctor's office performing the procedure if you have a fever, chills, rash or new illness prior to your procedure    Anticoagulation/antiplatelet medications  No Blood thinning medications such as Coumadin or Plavix 5 days prior to procedure unless your doctor said to continue these medications. Call your doctor if a new medication is prescribed in this class.     Restrictions for eating before procedure:   · If you are getting procedural sedation, then do not eat to for 8 hours prior to procedure appointment time. Do not drink fluids for four hours prior to your procedure time.   · If you are not having procedural sedation, then Skip the meal prior to your procedure. If you have a morning procedure then skip breakfast. If you have an afternoon procedure then skip lunch.   · You may drink clear liquids up to 2 hours prior to your procedure  · You must have a  the day of procedure to accompany you home.      POST PROCEDURE INSTRUCTIONS   · No heavy lifting, strenuous bending or  strenuous exercise for 3 days after your procedure.  · No hot tubs, baths, swimming for 3 days after your procedure  · You can remove the bandage the day after the procedure.  · IF YOU RECEIVED A STEROID INJECTION. PLEASE NOTE THAT THERE MAY BE A DELAY FOR THE INJECTION TO START WORKING, THE DELAY MAY BE UP TO TWO WEEKS. IF YOU HAVE DIABETES, PLEASE NOTE THAT YOUR SUGAR LEVELS MAY BE ELEVATED FOR 1-2 DAYS AFTER A STEROID INJECTION.  · IF YOU RECEIVED A DIAGNOSTIC PROCEDURE (SUCH AS A MEDIAL BRANCH BLOCK), PLEASE NOTE THAT WE DO EXPECT THIS INJECTION TO WEAR OFF.  IT IS IMPORTANT TO COMPLETE THE PAIN DIARY AND LIST THE PAIN SCORE ONLY FOR THE REGION WHERE THE PROCEDURE WAS AND BRING THIS TO YOUR FOLLOW UP VISIT.  · IF YOU RECEIVED A RADIOFREQUENCY PROCEDURE, THERE MAY BE SOME SORENESS AFTER THE PROCEDURE.  THIS IS NORMAL.  · IF YOU EXPERIENCE SEVERE PAIN OR PROLONGED WEAKNESS LONGER THAN ONE DAY, BOWEL OR BLADDER INCONTINENCE THEN PLEASE CALL THE PHYSIATRY OFFICE.  · Your leg may feel heavy, weak and numb for up to 1-2 days. Be very careful walking.   ·  You may resume normal activities 3 days after procedure.

## 2019-06-24 ENCOUNTER — HOSPITAL ENCOUNTER (OUTPATIENT)
Dept: RADIOLOGY | Facility: REHABILITATION | Age: 74
End: 2019-06-24
Attending: PHYSICAL MEDICINE & REHABILITATION

## 2019-06-24 ENCOUNTER — HOSPITAL ENCOUNTER (OUTPATIENT)
Dept: PAIN MANAGEMENT | Facility: REHABILITATION | Age: 74
End: 2019-06-24
Attending: PHYSICAL MEDICINE & REHABILITATION
Payer: MEDICARE

## 2019-06-24 VITALS
RESPIRATION RATE: 14 BRPM | HEART RATE: 72 BPM | TEMPERATURE: 98.2 F | DIASTOLIC BLOOD PRESSURE: 93 MMHG | HEIGHT: 65 IN | OXYGEN SATURATION: 98 % | WEIGHT: 153.66 LBS | BODY MASS INDEX: 25.6 KG/M2 | SYSTOLIC BLOOD PRESSURE: 166 MMHG

## 2019-06-24 PROCEDURE — 700101 HCHG RX REV CODE 250

## 2019-06-24 PROCEDURE — 64490 INJ PARAVERT F JNT C/T 1 LEV: CPT

## 2019-06-24 PROCEDURE — 64491 INJ PARAVERT F JNT C/T 2 LEV: CPT

## 2019-06-24 PROCEDURE — 700117 HCHG RX CONTRAST REV CODE 255

## 2019-06-24 PROCEDURE — 700111 HCHG RX REV CODE 636 W/ 250 OVERRIDE (IP)

## 2019-06-24 RX ORDER — LIDOCAINE HYDROCHLORIDE 10 MG/ML
INJECTION, SOLUTION EPIDURAL; INFILTRATION; INTRACAUDAL; PERINEURAL
Status: COMPLETED
Start: 2019-06-24 | End: 2019-06-24

## 2019-06-24 RX ORDER — LIDOCAINE HYDROCHLORIDE 20 MG/ML
INJECTION, SOLUTION EPIDURAL; INFILTRATION; INTRACAUDAL; PERINEURAL
Status: COMPLETED
Start: 2019-06-24 | End: 2019-06-24

## 2019-06-24 RX ADMIN — LIDOCAINE HYDROCHLORIDE 10 ML: 10 INJECTION, SOLUTION EPIDURAL; INFILTRATION; INTRACAUDAL; PERINEURAL at 15:15

## 2019-06-24 RX ADMIN — IOHEXOL 5 ML: 240 INJECTION, SOLUTION INTRATHECAL; INTRAVASCULAR; INTRAVENOUS; ORAL at 15:22

## 2019-06-24 RX ADMIN — LIDOCAINE HYDROCHLORIDE 5 ML: 20 INJECTION, SOLUTION EPIDURAL; INFILTRATION; INTRACAUDAL; PERINEURAL at 15:23

## 2019-06-24 NOTE — NON-PROVIDER
Current medications reviewed with pt, see medications reconciliation form. Pt martin taking ASA or other blood thinners or anti-inflammatories.  Pt has a ride post-procedure(Alejandro to drive).  Printed and verbal discharge instructions given to pt who verbalized understanding.

## 2019-06-24 NOTE — NON-PROVIDER
Pt tolerated fluids, no nausea, able to ambulate. Discharged per MD orders.   Unknown if ever smoked

## 2019-06-24 NOTE — NON-PROVIDER
Pre assessment complete, time out completed, including 2 pt identifiers, procedure, allergies, stop bang 1 pt history. Pt positioned prone by xrt and cst, ankles resting on pillow, hands initially resting on head of table, later hands were tucked.

## 2019-06-24 NOTE — PROCEDURES
Date of Service: see epic time stamp for DOS    Patient: Yajaira Eduardo 74 y.o. female     MRN: [id number]     Physician/s: Benson Ventura MD    Pre-operative Diagnosis: Cervical spondylosis, facet arthropathy    Post-operative Diagnosis: Cervical spondylosis, facet arthropathy    Procedure: LEFT C2-3 and  C3-4 Medial Branch Block     Description of procedure:    The risks, benefits, and alternatives of the procedure were reviewed and discussed with the patient.  Written informed consent was freely obtained. A pre-procedural time-out was conducted by the physician verifying patient’s identity, procedure to be performed, procedure site and side, and allergy verification. Appropriate equipment was determined to be in place for the procedure.       The patient's vital signs were carefully monitored before, throughout, and after the procedure.     In the fluoroscopy suite the patient was placed in a prone position, a pillow placed underneath their chest, and the head was turned to the opposite side of injection. The skin was prepped and draped in the usual sterile fashion. The fluoroscope was placed over the cervical neck at the appropriate injection angles, and the targets for injection were marked at the Third occipital nerve, C2-3 and C3-4 levels. A 27g 1.5'' needle was placed into each of the markings levels as above, and approx 0.5mL of 1% Lidocaine was injected subcutaneously into the epidermal and dermal layers. The needle was removed.       A 25g 3.5'' needle was then placed at the lateral aspect of the articular pillars, whereby the medial branch runs, at the C3 levels on the LEFT side.     A 25g 3.5'' needle was then placed at the lateral aspect of the articular pillars, whereby the medial branch runs, at the C4 levels on the LEFT side.       The needle tips were then verified by AP, contralateral oblique, and lateral views. In the AP view, less than 1 cc of contrast dye was used to highlight the medial branch  while the fluoroscope was running live. Following negative aspiration, approx 0.3mL 2% lidocaine was then injected at the above levels, and the needles were removed intact after restyleted. The patient's back was covered with a 4x4 gauze, the area was cleansed with sterile normal saline, and a dressing was applied.     A 25g 3.5'' needle was then placed at the lateral aspect of the articular pillars, whereby the medial branch runs, at the Third occipital nerve adjacent to the C2-3 facet joint on the left side    The needle tips were then verified by AP, contralateral oblique, and lateral views. In the AP view, less than 1 cc of contrast dye was used to highlight the medial branch while the fluoroscope was running live. Following negative aspiration, approx 0.3mL 2% lidocaine was then injected at the above levels, and the needles were removed intact after restyleted.     There were no complications noted, and patient was hemodynamically stable before and after the procedure.     The patient had 100% pain relief in her headaches and neck pain post procedure.    Follow-up visit is scheduled for 6/26/2019     Benson Ventura MD  Physical Medicine and Rehabilitation  Interventional Spine and Sports Physiatry  [facility]          CPT  Intraarticular joint or medial branch block (MBB) - cervical or thoracic (1st level):  73013  Intraarticular joint or medial branch block (MBB) - cervical or thoracic (2nd level):  00272

## 2019-06-26 ENCOUNTER — TELEPHONE (OUTPATIENT)
Dept: PHYSICAL MEDICINE AND REHAB | Facility: MEDICAL CENTER | Age: 74
End: 2019-06-26

## 2019-06-26 ENCOUNTER — HOSPITAL ENCOUNTER (OUTPATIENT)
Dept: RADIOLOGY | Facility: REHABILITATION | Age: 74
End: 2019-06-26
Attending: PHYSICAL MEDICINE & REHABILITATION

## 2019-06-26 ENCOUNTER — HOSPITAL ENCOUNTER (OUTPATIENT)
Dept: PAIN MANAGEMENT | Facility: REHABILITATION | Age: 74
End: 2019-06-26
Attending: PHYSICAL MEDICINE & REHABILITATION
Payer: MEDICARE

## 2019-06-26 VITALS
DIASTOLIC BLOOD PRESSURE: 89 MMHG | BODY MASS INDEX: 25.42 KG/M2 | HEIGHT: 65 IN | OXYGEN SATURATION: 98 % | HEART RATE: 69 BPM | TEMPERATURE: 97.6 F | RESPIRATION RATE: 16 BRPM | WEIGHT: 152.56 LBS | SYSTOLIC BLOOD PRESSURE: 184 MMHG

## 2019-06-26 PROCEDURE — 700101 HCHG RX REV CODE 250

## 2019-06-26 PROCEDURE — 64490 INJ PARAVERT F JNT C/T 1 LEV: CPT

## 2019-06-26 PROCEDURE — 700117 HCHG RX CONTRAST REV CODE 255

## 2019-06-26 PROCEDURE — 64491 INJ PARAVERT F JNT C/T 2 LEV: CPT

## 2019-06-26 PROCEDURE — 700111 HCHG RX REV CODE 636 W/ 250 OVERRIDE (IP)

## 2019-06-26 RX ORDER — LIDOCAINE HYDROCHLORIDE 20 MG/ML
INJECTION, SOLUTION EPIDURAL; INFILTRATION; INTRACAUDAL; PERINEURAL
Status: COMPLETED
Start: 2019-06-26 | End: 2019-06-26

## 2019-06-26 RX ORDER — LIDOCAINE HYDROCHLORIDE 10 MG/ML
INJECTION, SOLUTION EPIDURAL; INFILTRATION; INTRACAUDAL; PERINEURAL
Status: COMPLETED
Start: 2019-06-26 | End: 2019-06-26

## 2019-06-26 RX ADMIN — IOHEXOL 8 ML: 240 INJECTION, SOLUTION INTRATHECAL; INTRAVASCULAR; INTRAVENOUS; ORAL at 13:56

## 2019-06-26 RX ADMIN — LIDOCAINE HYDROCHLORIDE 5 ML: 20 INJECTION, SOLUTION EPIDURAL; INFILTRATION; INTRACAUDAL; PERINEURAL at 13:56

## 2019-06-26 RX ADMIN — LIDOCAINE HYDROCHLORIDE 10 ML: 10 INJECTION, SOLUTION EPIDURAL; INFILTRATION; INTRACAUDAL; PERINEURAL at 13:56

## 2019-06-26 NOTE — NON-PROVIDER
Current medications reviewed with pt, see medications reconciliation form. Pt martin taking ASA or other blood thinners or anti-inflammatories.  Pt has a ride post-procedure(Jayce to drive).  Printed and verbal discharge instructions given to pt who verbalized understanding.

## 2019-06-26 NOTE — PROCEDURES
Date of Service: see epic time stamp for DOS    Patient: Yajaira Eduardo 74 y.o. female     MRN: [id number]     Physician/s: Benson Ventura MD    Pre-operative Diagnosis: Cervical spondylosis, facet arthropathy    Post-operative Diagnosis: Cervical spondylosis, facet arthropathy    Procedure: LEFT C2-3 and C3-4 Medial Branch Block #2    Description of procedure:    The risks, benefits, and alternatives of the procedure were reviewed and discussed with the patient.  Written informed consent was freely obtained. A pre-procedural time-out was conducted by the physician verifying patient’s identity, procedure to be performed, procedure site and side, and allergy verification. Appropriate equipment was determined to be in place for the procedure.       The patient's vital signs were carefully monitored before, throughout, and after the procedure.     In the fluoroscopy suite the patient was placed in a prone position, a pillow placed underneath their chest, and the head was turned to the opposite side of injection. The skin was prepped and draped in the usual sterile fashion. The fluoroscope was placed over the cervical neck at the appropriate injection angles, and the targets for injection were marked at the C2-3 at the third occipital nerve and C3, C4 levels. A 27g 1.5'' needle was placed into each of the markings levels as above, and approx 0.5mL of 1% Lidocaine was injected subcutaneously into the epidermal and dermal layers. The needle was removed. A 25g 3.5'' needle was then placed at the lateral aspect of the articular pillars at the C3 and C4 levels , whereby the medial branch runs as well as at the C2-3 joint line where the third occipital nerve is on the LEFT side. The needle tips were then verified by AP, contralateral oblique, and lateral views. In the AP view, less than 1 cc of contrast dye was used to highlight the medial branch while the fluoroscope was running live at the C4 and C3 levels.  At the C2-3  level this was initially intra-articular and the needle was adjusted to highlight the third occipital nerve.. Following negative aspiration, approx 0.3mL 2% lidocaine was then injected at the above levels, and the needles were removed intact after restyleted. The patient's back was covered with a 4x4 gauze, the area was cleansed with sterile normal saline, and a dressing was applied.     There were no complications noted, and patient was hemodynamically stable before and after the procedure.     The patient had 100% pain relief of her neck pain and headaches immediately post procedure.    Follow-up visit is scheduled for 6/27/2019     Benson Ventura MD  Physical Medicine and Rehabilitation  Interventional Spine and Sports Physiatry  [facility]          CPT  Intraarticular joint or medial branch block (MBB) - cervical or thoracic (1st level):  95719  Intraarticular joint or medial branch block (MBB) - cervical or thoracic (2nd level):  87256

## 2019-06-26 NOTE — TELEPHONE ENCOUNTER
Called Pt and LM to call us back in regards to her S/P that was done 06/24/19 w/ Dr. Ventura, to check how she been doing.    Thank you  Alivia   .

## 2019-06-27 ENCOUNTER — OFFICE VISIT (OUTPATIENT)
Dept: PHYSICAL MEDICINE AND REHAB | Facility: MEDICAL CENTER | Age: 74
End: 2019-06-27
Payer: MEDICARE

## 2019-06-27 VITALS
HEIGHT: 65 IN | WEIGHT: 153.44 LBS | SYSTOLIC BLOOD PRESSURE: 118 MMHG | TEMPERATURE: 98 F | HEART RATE: 76 BPM | DIASTOLIC BLOOD PRESSURE: 64 MMHG | OXYGEN SATURATION: 95 % | BODY MASS INDEX: 25.56 KG/M2

## 2019-06-27 DIAGNOSIS — G44.86 CERVICOGENIC HEADACHE: ICD-10-CM

## 2019-06-27 DIAGNOSIS — M48.02 CERVICAL SPINAL STENOSIS: ICD-10-CM

## 2019-06-27 DIAGNOSIS — M47.812 SPONDYLOSIS OF CERVICAL REGION WITHOUT MYELOPATHY OR RADICULOPATHY: ICD-10-CM

## 2019-06-27 PROCEDURE — 99214 OFFICE O/P EST MOD 30 MIN: CPT | Performed by: PHYSICAL MEDICINE & REHABILITATION

## 2019-06-27 RX ORDER — DULOXETIN HYDROCHLORIDE 30 MG/1
30 CAPSULE, DELAYED RELEASE ORAL DAILY
COMMUNITY
End: 2019-10-14

## 2019-06-27 ASSESSMENT — PAIN SCALES - GENERAL: PAINLEVEL: NO PAIN

## 2019-06-27 ASSESSMENT — PATIENT HEALTH QUESTIONNAIRE - PHQ9: CLINICAL INTERPRETATION OF PHQ2 SCORE: 0

## 2019-06-27 NOTE — PROGRESS NOTES
Follow up patient note  Interventional spine and sports physiatry, Physical medicine rehabilitation      Chief complaint:   Chief Complaint   Patient presents with   • Follow-Up     Cervical spinal stenosis         HISTORY    Please see new patient note dated  by Dr Ventura,  for more details.     HPI  Patient identification: Yajaira Eduardo 74 y.o. female  With Diagnoses of Cervical spinal stenosis, Cervicogenic headache, and Spondylosis of cervical region without myelopathy or radiculopathy were pertinent to this visit.       -The patient had greater than 80% pain relief x2 with diagnostic medial branch blocks targeting the left C2-3 and C3-4 facet joints.  Her headaches were also 100% resolved after these procedures temporarily as expected with diagnostic nerve blocks and these included the third occipital nerve.  The stiffness in her neck was also relieved during the diagnostic phase of these blocks for the first several hours.  Now the pain and stiffness has returned in her neck in the same distribution, moderate intensity, worse with neck movements, causes difficulty with sleeping and reading.       ROS Red Flags :   Fever, Chills, Sweats: Denies  Involuntary Weight Loss: Denies  Bowel/Bladder Incontinence: Denies  Saddle Anesthesia: Denies        PMHx:   Past Medical History:   Diagnosis Date   • Abnormal mammogram, unspecified     5/13/09 Diagnostic mammo L breast:  Negative (next 11/13/09)   • Cervicalgia     controlled   • Current moderate episode of major depressive disorder without prior episode (HCC) 5/20/2019   • Cystocele, midline     uncontrolled   • Degenerative disc disease, cervical 5/20/2019   • Diarrhea following gastrointestinal surgery 4/26/2010   • Fracture closed, mandible     after trauma   • Glucose intolerance (impaired glucose tolerance) 10/23/2017   • Lactose intolerance    • Leukopenia 10/26/2010   • Multinodular goiter (nontoxic) 5/20/2019   • OA (osteoarthritis) 4/20/2010   •  Postmenopausal atrophic vaginitis 4/26/2010   • Pure hypercholesterolemia 4/26/2010   • S/P colonoscopy     1996: Normal   • Seasonal and perennial allergic rhinoconjunctivitis     hayfever, no rx   • Thoracic or lumbosacral neuritis or radiculitis, unspecified     controlled   • Unspecified vitamin D deficiency 4/26/2010   • Urinary bladder disorder        PSHx:   Past Surgical History:   Procedure Laterality Date   • COLPOSACROPEXY ROBOTIC  8/22/2013    Performed by Elisa Jones M.D. at [facility]   • CYSTOSCOPY  8/22/2013    Performed by Elisa Jones M.D. at [facility]   • BLADDER SLING FEMALE     • BREAST BIOPSY      hx of left breast biopsy-benign   • PHU BY LAPAROSCOPY     • HYSTERECTOMY, VAGINAL      without BSO   • TONSILLECTOMY     • US-NEEDLE CORE BX-BREAST PANEL         Family history   Family History   Problem Relation Age of Onset   • Cancer Mother    • Diabetes Father    • Heart Disease Father         Atrial Fib         Medications:   Outpatient Prescriptions Marked as Taking for the 6/27/19 encounter (Office Visit) with Benson Ventura M.D.   Medication Sig Dispense Refill   • DULoxetine (CYMBALTA) 30 MG Cap DR Particles Take 30 mg by mouth every day.     • Cyanocobalamin (B-12) 1000 MCG SL Tab Place  under tongue.     • Multiple Vitamins-Minerals (CENTRUM SILVER ADULT 50+ PO) Take  by mouth.     • Flaxseed, Linseed, (FLAXSEED OIL) 1200 MG CAPS Take  by mouth.     • Glucosamine-Chondroit-Vit C-Mn (GLUCOSAMINE CHONDR 1500 COMPLX PO) Take  by mouth.     • vitamin D (CHOLECALCIFEROL) 1000 UNIT TABS Take 6,000 Units by mouth every day.     • CALCIUM 600 + D PO Take 1 Tab by mouth 2 Times a Day.          Current Outpatient Prescriptions on File Prior to Visit   Medication Sig Dispense Refill   • Cyanocobalamin (B-12) 1000 MCG SL Tab Place  under tongue.     • Multiple Vitamins-Minerals (CENTRUM SILVER ADULT 50+ PO) Take  by mouth.     • Flaxseed, Linseed,  (FLAXSEED OIL) 1200 MG CAPS Take  by mouth.     • Glucosamine-Chondroit-Vit C-Mn (GLUCOSAMINE CHONDR 1500 COMPLX PO) Take  by mouth.     • vitamin D (CHOLECALCIFEROL) 1000 UNIT TABS Take 6,000 Units by mouth every day.     • CALCIUM 600 + D PO Take 1 Tab by mouth 2 Times a Day.     • non-formulary med Preser Vision     • sertraline (ZOLOFT) 50 MG Tab Take 1 Tab by mouth every day. (Patient not taking: Reported on 6/18/2019) 90 Tab 3   • Cholestyramine Light 4 GM Powder Take 4 g by mouth every day. (Patient not taking: Reported on 6/27/2019) 3 Can 3   • ascorbic acid (ASCORBIC ACID) 500 MG TABS Take 500 mg by mouth every day.       No current facility-administered medications on file prior to visit.          Allergies:   Allergies   Allergen Reactions   • Other Environmental Runny Nose     hayfever-runny nose       Social Hx:   Social History     Social History   • Marital status:      Spouse name: N/A   • Number of children: N/A   • Years of education: N/A     Occupational History   • Not on file.     Social History Main Topics   • Smoking status: Former Smoker     Packs/day: 1.00     Years: 30.00     Types: Cigarettes     Quit date: 1/1/1992   • Smokeless tobacco: Never Used      Comment: 1992   • Alcohol use 7.0 oz/week     14 Glasses of wine per week      Comment: 10-12 weekly wine/natalya.  denies hx of detox   • Drug use: No   • Sexual activity: Yes     Partners: Male     Birth control/ protection: Post-Menopausal      Comment: , retired     Other Topics Concern   •  Service No   • Blood Transfusions No   • Caffeine Concern Yes   • Occupational Exposure No   • Hobby Hazards No   • Sleep Concern No   • Stress Concern No   • Weight Concern No   • Special Diet No   • Back Care No   • Exercise Yes     golfs 4 times per week   • Bike Helmet No   • Seat Belt Yes   • Self-Exams No     Social History Narrative    , retired, spends half the year in [facility]         EXAMINATION  "    Physical Exam:   Vitals: /64 (BP Location: Left arm, Patient Position: Sitting, BP Cuff Size: Adult)   Pulse 76   Temp 36.7 °C (98 °F) (Temporal)   Ht 1.651 m (5' 5\")   Wt 69.6 kg (153 lb 7 oz)   SpO2 95%     Constitutional:   Body Habitus: Body mass index is 25.53 kg/m².  Cooperation: Fully cooperates with exam  Appearance: Well-groomed no disheveled    Respiratory-  breathing comfortable on room air, no audible wheezing  Cardiovascular- capillary refills less than 2 seconds. No lower extremity edema is noted.   Psychiatric- alert and oriented ×3. Normal affect.    MSK: -There are no signs of infection around the injection sites.  Mild tenderness palpation around the cervical facet joints in the upper region on the left.  Decreased range of motion of the cervical spine.          MEDICAL DECISION MAKING    DATA    Labs:   Lab Results   Component Value Date/Time    SODIUM 139 10/15/2018 09:33 AM    POTASSIUM 4.1 10/15/2018 09:33 AM    CHLORIDE 106 10/15/2018 09:33 AM    CO2 26 10/15/2018 09:33 AM    GLUCOSE 91 10/15/2018 09:33 AM    BUN 19 10/15/2018 09:33 AM    CREATININE 0.80 10/15/2018 09:33 AM    CREATININE 0.9 10/23/2007 07:36 AM        Lab Results   Component Value Date/Time    PROTHROMBTM 12.1 09/02/2008 02:48 PM    INR 1.04 09/02/2008 02:48 PM        Lab Results   Component Value Date/Time    WBC 4.5 (L) 05/13/2019 07:17 AM    WBC 4.4 01/26/2011 07:20 AM    WBC 4.4 01/26/2011 07:20 AM    RBC 4.12 (L) 05/13/2019 07:17 AM    RBC 4.07 01/26/2011 07:20 AM    RBC 4.07 01/26/2011 07:20 AM    HEMOGLOBIN 12.8 05/13/2019 07:17 AM    HEMATOCRIT 40.9 05/13/2019 07:17 AM    MCV 99.3 (H) 05/13/2019 07:17 AM    MCV 94 01/26/2011 07:20 AM    MCV 94 01/26/2011 07:20 AM    MCH 31.1 05/13/2019 07:17 AM    MCH 31.7 01/26/2011 07:20 AM    MCH 31.7 01/26/2011 07:20 AM    MCHC 31.3 (L) 05/13/2019 07:17 AM    MPV 11.0 05/13/2019 07:17 AM    NEUTSPOLYS 53.10 05/13/2019 07:17 AM    LYMPHOCYTES 38.00 05/13/2019 07:17 AM "    MONOCYTES 6.70 05/13/2019 07:17 AM    EOSINOPHILS 1.30 05/13/2019 07:17 AM    BASOPHILS 0.90 05/13/2019 07:17 AM        No results found for: HBA1C       DIAGNOSIS   Visit Diagnoses     ICD-10-CM   1. Cervical spinal stenosis M48.02   2. Cervicogenic headache R51   3. Spondylosis of cervical region without myelopathy or radiculopathy M47.812         ASSESSMENT and PLAN:     Yajaira Eduardo 74 y.o. female      Yajaira was seen today for follow-up.    Diagnoses and all orders for this visit:    Cervical spinal stenosis    Cervicogenic headache    Spondylosis of cervical region without myelopathy or radiculopathy  -     REFERRAL TO PHYSICIAL MEDICINE REHAB        Positive diagnostic medial branch block x2 targeting the left C2-3 and C3-4 facet joints.  I scheduled the patient for a radiofrequency neurotomy of the medial branches nerves innervating that left C2-3 and C3-4 facet joints.     The risks benefits and alternatives to this procedure were discussed and the patient wishes to proceed with the procedure. Risks include but are not limited to damage to surrounding structures, infection, bleeding, worsening of pain which can be permanent, weakness which can be permanent. Benefits include pain relief, improved function. Alternatives includes not doing the procedure.       I also discussed the case the patient's .     The patient may take Tylenol 1000 mg 3 times daily post procedure.  If it is okay with her surgeon she may also take ibuprofen 800 mg 3 times daily for the first 7 days after the above procedure    The patient also has an upcoming hip replacement.  I wrote a note for the patient to take to her surgeon for the preop visit to discuss the procedure that I am planning to see if this is amenable to doing prior to her hip replacement.    Follow up: 6 weeks after the above procedure    Thank you for allowing me to participate in the care of this patient. If you have any questions please not hesitate to  contact me.        Please note that this dictation was created using voice recognition software. I have made every reasonable attempt to correct obvious errors but there may be errors of grammar and content that I may have overlooked prior to finalization of this note.      Benson Ventura MD  Interventional Spine and Sports Physiatry  Physical Medicine and Rehabilitation  [facility]  6/27/2019  9:00 AM

## 2019-06-27 NOTE — PATIENT INSTRUCTIONS
I am planning on doing a left C2-3 and C3-4 radio frequency neurotomy of the medial branch nerves.  I will not be leaving any hardware in place.  No incisions will be made.  No steroid will be given.  I am planning this procedure for August 5, 2019.  I have never had an infection while doing this procedure.  Please let the patient know if this will be interfering with her joint replacement which is upcoming.       Benson Ventura MD  Physical Medicine and Rehabilitation  Interventional Spine and Sports Physiatry  [facility]             Your procedure will be at the [facility] special procedure suite.    [address] [place] [address]       PRE-PROCEDURE INSTRUCTIONS  You may take your regular medications except:   · No Anti-inflammatories 5 days prior to your procedure. Anti-inflammatories include medicines such as  ibuprofen (Motrin, Advil), Excedrin, Naproxen (Aleve, Anaprox, Naprelan, Naprosyn), Celecoxib (Celebrex), Diclofenac (Voltaren-XR tab), and Meloxicam (Mobic).   · No Glucophage or Metformin 24 hours before your procedure. You may resume next day after your procedure.  · Call the physiatry office if you are taking or prescribed anti-biotics within five days of procedure.  · Please ask provider if you are taking any new diabetes medication.  · CONTINUE TAKING BLOOD PRESSURE MEDICATIONS AS PRESCRIBED.  · Pain medications will not be prescribed on the procedure day. Procedural pain medication may be used by your provider   · Call your doctor's office performing the procedure if you have a fever, chills, rash or new illness prior to your procedure    Anticoagulation/antiplatelet medications  No Blood thinning medications such as Coumadin or Plavix 5 days prior to procedure unless your doctor said to continue these medications. Call your doctor if a new medication is prescribed in this class.     Restrictions for eating before procedure:   · If you are getting procedural sedation,  then do not eat to for 8 hours prior to procedure appointment time. Do not drink fluids for four hours prior to your procedure time.   · If you are not having procedural sedation, then Skip the meal prior to your procedure. If you have a morning procedure then skip breakfast. If you have an afternoon procedure then skip lunch.   · You may drink clear liquids up to 2 hours prior to your procedure  · You must have a  the day of procedure to accompany you home.      POST PROCEDURE INSTRUCTIONS   · No heavy lifting, strenuous bending or strenuous exercise for 3 days after your procedure.  · No hot tubs, baths, swimming for 3 days after your procedure  · You can remove the bandage the day after the procedure.  · IF YOU RECEIVED A RADIOFREQUENCY PROCEDURE, THERE MAY BE SOME SORENESS AFTER THE PROCEDURE.  THIS IS NORMAL.  · IF YOU EXPERIENCE PROLONGED WEAKNESS LONGER THAN ONE DAY, BOWEL OR BLADDER INCONTINENCE THEN PLEASE CALL THE PHYSIATRY OFFICE.  · Your leg may feel heavy, weak and numb for up to 1-2 days. Be very careful walking.   ·  You may resume normal activities 3 days after procedure.

## 2019-07-04 NOTE — PROGRESS NOTES
Prolonged non-face-to-face time was spent on 7/4/2019  from 1214 to 1246 by this reviewer.    An extensive record review was done which relates to the care of this patient. I reviewed the records from [facility] [place] including a consultation from 4/17/2019, imaging review and plan from Dr. Amor.  Secondary to cervical arthropathy and facet syndrome.  The patient went through physical therapy, had anti-inflammatories.  At this time it was thought that surgery would not be beneficial.  There was a second consultation where if the patient did not get improvement with pain interventions and she would be considered for surgery.

## 2019-08-05 ENCOUNTER — HOSPITAL ENCOUNTER (OUTPATIENT)
Dept: PAIN MANAGEMENT | Facility: REHABILITATION | Age: 74
End: 2019-08-05
Attending: PHYSICAL MEDICINE & REHABILITATION
Payer: MEDICARE

## 2019-08-05 ENCOUNTER — HOSPITAL ENCOUNTER (OUTPATIENT)
Dept: RADIOLOGY | Facility: REHABILITATION | Age: 74
End: 2019-08-05
Attending: PHYSICAL MEDICINE & REHABILITATION

## 2019-08-05 VITALS
TEMPERATURE: 98.2 F | DIASTOLIC BLOOD PRESSURE: 84 MMHG | HEART RATE: 69 BPM | OXYGEN SATURATION: 98 % | RESPIRATION RATE: 17 BRPM | SYSTOLIC BLOOD PRESSURE: 156 MMHG | WEIGHT: 155.87 LBS | HEIGHT: 65 IN | BODY MASS INDEX: 25.97 KG/M2

## 2019-08-05 PROCEDURE — 64633 DESTROY CERV/THOR FACET JNT: CPT

## 2019-08-05 PROCEDURE — 99153 MOD SED SAME PHYS/QHP EA: CPT

## 2019-08-05 PROCEDURE — 700111 HCHG RX REV CODE 636 W/ 250 OVERRIDE (IP)

## 2019-08-05 PROCEDURE — 64634 DESTROY C/TH FACET JNT ADDL: CPT

## 2019-08-05 PROCEDURE — 99152 MOD SED SAME PHYS/QHP 5/>YRS: CPT

## 2019-08-05 RX ORDER — MIDAZOLAM HYDROCHLORIDE 1 MG/ML
INJECTION INTRAMUSCULAR; INTRAVENOUS
Status: COMPLETED
Start: 2019-08-05 | End: 2019-08-05

## 2019-08-05 RX ORDER — LIDOCAINE HYDROCHLORIDE 10 MG/ML
INJECTION, SOLUTION EPIDURAL; INFILTRATION; INTRACAUDAL; PERINEURAL
Status: COMPLETED
Start: 2019-08-05 | End: 2019-08-05

## 2019-08-05 RX ADMIN — MIDAZOLAM HYDROCHLORIDE 1 MG: 1 INJECTION, SOLUTION INTRAMUSCULAR; INTRAVENOUS at 16:52

## 2019-08-05 RX ADMIN — FENTANYL CITRATE 50 MCG: 50 INJECTION, SOLUTION INTRAMUSCULAR; INTRAVENOUS at 16:52

## 2019-08-05 RX ADMIN — LIDOCAINE HYDROCHLORIDE 30 ML: 10 INJECTION, SOLUTION EPIDURAL; INFILTRATION; INTRACAUDAL; PERINEURAL at 17:10

## 2019-08-05 NOTE — NON-PROVIDER
Denied taking any blood thinners and  any anti- inflammatories medications. Printed home care education and verbal instruction given to patient and verbalized understanding.Patient had a  ( Jayce/ spouse ).. Stop bang score # 1.  Dr. Ventura made aware and assessed patient. . Hand off reported to cathy Huntley RN.

## 2019-08-06 NOTE — PROCEDURES
Date of Service: 8/5/2019    Physician/s: Benson Ventura MD    Pre-operative Diagnosis: Cervical spondylosis, facet arthropathy     Post-operative Diagnosis: Cervical spondylosis, facet arthropathy     Procedure: LEFT Medial Branch Radiofrequency neurotomy targeting the C2-3 and C3-4 facet joints    Description of procedure:  The risks, benefits, and alternatives of the procedure were reviewed and discussed with the patient.  Written informed consent was freely obtained. A pre-procedural time-out was conducted by the physician verifying patient’s identity, procedure to be performed, procedure site and side, and allergy verification. Appropriate equipment was determined to be in place for the procedure.     An IV was placed peripherally, the patient received 1mg of IV Versed for anxiolysis, and 50mcg  of IV Fentanyl for pain control. The patient's vital signs were carefully monitored before, throughout, and after the procedure. This was for greater than 15 minutes.     In the fluoroscopy suite the patient was placed in a lateral position, with the target side up. The skin was prepped and draped in the usual sterile fashion. The fluoroscope was placed over the cervical neck at the appropriate angles, and the targets for needle/probe placement were marked. A 25g needle was placed into each of the markings at the target levels, and 2mL of 1% Lidocaine was injected subcutaneously into the epidermal and dermal layers. The needle was removed.     An 50mm with a 2 mm active tip cervical cooled RF system introducer was then placed using a lateral approach to the aspect of the articular pillars, whereby the medial branch runs, at the C2-3 levels on the LEFT side. This was targetting The needle/probe tips were then verified by AP, lateral, and contralateral oblique views. Once the needle/probe tips were in the optimal positions for radiofrequency neurotomy, Motor stimulation is used as an extra precaution to ensure the needle  position is off the cervical nerve roots, prior to each lesion.  There is no motor stimulation in the arm and there was twitching in the cervical paraspinous muscles as expected.  Following negative aspiration, 1cc of 1% Lidocaine was then injected at each location. After a wait period of approximately 1 minute, a radiofrequency lesion was then created at each level with a temperature of 80 degrees centigrade for 2 minutes and 30 seconds. The probe was then redirected to the inferior aspect of the C2 articular pillar targeting the third occipital nerve.  This was confirmed with AP, lateral views.  Motor testing was done with no twitching in the arm.  A radiofrequency lesion was then created at that level with 80 °C for 2 minutes and 30 seconds.    An 50mm with a 2 mm active tip cervical cooled RF system introducer was then placed using a lateral approach to the aspect of the articular pillars, whereby the medial branch runs, at the C4 levels on the LEFT side. This was targetting The needle/probe tips were then verified by AP, lateral, and contralateral oblique views. Once the needle/probe tips were in the optimal positions for radiofrequency neurotomy, Motor stimulation is used as an extra precaution to ensure the needle position is off the cervical nerve roots, prior to each lesion.  There is no motor stimulation in the arm and there was twitching in the cervical paraspinous muscles as expected.  Following negative aspiration, 1cc of 1% Lidocaine was then injected at each location. After a wait period of approximately 1 minute, a radiofrequency lesion was then created at each level with a temperature of 80 degrees centigrade for 2 minutes and 30 seconds.     The radiofrequency probes were removed intact.      The patient's back was covered with a 4x4 gauze, the area was cleansed with sterile normal saline, and a dressing was applied. There were no complications noted, the patient remained hemodynamically stable,  and the patient tolerated the procedure well.    Benson Ventura MD  Physical Medicine and Rehabilitation  Interventional Spine and Sports Physiatry  [facility]        CPT  Radiofrequency ablation (RFA) - cervical or thoracic (1st joint):  87063  Radiofrequency ablation (RFA) - cervical or thoracic (each additional joint):  98397  moderate procedural sedation first 15 minutes: 09819

## 2019-08-06 NOTE — NON-PROVIDER
1706 PM    .   Tolerated fluids well.  Ice pack applied to affected area. Patient able to  move all extremities without difficulty voluntarily and on command. Dr. Ventura evaluated patient.

## 2019-08-07 ENCOUNTER — TELEPHONE (OUTPATIENT)
Dept: PHYSICAL MEDICINE AND REHAB | Facility: MEDICAL CENTER | Age: 74
End: 2019-08-07

## 2019-08-11 NOTE — H&P
Follow up patient note  Interventional spine and sports physiatry, Physical medicine rehabilitation        Chief complaint:        Chief Complaint   Patient presents with   • Follow-Up       Cervical spinal stenosis            HISTORY    Please see new patient note dated  by Dr Ventura,  for more details.      HPI  Patient identification: Yajaira Eduardo 74 y.o. female  With Diagnoses of Cervical spinal stenosis, Cervicogenic headache, and Spondylosis of cervical region without myelopathy or radiculopathy were pertinent to this visit.         -The patient had greater than 80% pain relief x2 with diagnostic medial branch blocks targeting the left C2-3 and C3-4 facet joints.  Her headaches were also 100% resolved after these procedures temporarily as expected with diagnostic nerve blocks and these included the third occipital nerve.  The stiffness in her neck was also relieved during the diagnostic phase of these blocks for the first several hours.  Now the pain and stiffness has returned in her neck in the same distribution, moderate intensity, worse with neck movements, causes difficulty with sleeping and reading.        ROS Red Flags :   Fever, Chills, Sweats: Denies  Involuntary Weight Loss: Denies  Bowel/Bladder Incontinence: Denies  Saddle Anesthesia: Denies          PMHx:   Past Medical History   Past Medical History:   Diagnosis Date   • Abnormal mammogram, unspecified       5/13/09 Diagnostic mammo L breast:  Negative (next 11/13/09)   • Cervicalgia       controlled   • Current moderate episode of major depressive disorder without prior episode (HCC) 5/20/2019   • Cystocele, midline       uncontrolled   • Degenerative disc disease, cervical 5/20/2019   • Diarrhea following gastrointestinal surgery 4/26/2010   • Fracture closed, mandible       after trauma   • Glucose intolerance (impaired glucose tolerance) 10/23/2017   • Lactose intolerance     • Leukopenia 10/26/2010   • Multinodular goiter (nontoxic)  5/20/2019   • OA (osteoarthritis) 4/20/2010   • Postmenopausal atrophic vaginitis 4/26/2010   • Pure hypercholesterolemia 4/26/2010   • S/P colonoscopy       1996: Normal   • Seasonal and perennial allergic rhinoconjunctivitis       hayfever, no rx   • Thoracic or lumbosacral neuritis or radiculitis, unspecified       controlled   • Unspecified vitamin D deficiency 4/26/2010   • Urinary bladder disorder              PSHx:   Past Surgical History         Past Surgical History:   Procedure Laterality Date   • COLPOSACROPEXY ROBOTIC   8/22/2013     Performed by Elisa Jones M.D. at SURGERY [facility]   • CYSTOSCOPY   8/22/2013     Performed by Elisa Jones M.D. at SURGERY [facility]   • BLADDER SLING FEMALE       • BREAST BIOPSY         hx of left breast biopsy-benign   • PHU BY LAPAROSCOPY       • HYSTERECTOMY, VAGINAL         without BSO   • TONSILLECTOMY       • US-NEEDLE CORE BX-BREAST PANEL                Family history   Family History         Family History   Problem Relation Age of Onset   • Cancer Mother     • Diabetes Father     • Heart Disease Father           Atrial Fib               Medications:   Active Medications          Outpatient Prescriptions Marked as Taking for the 6/27/19 encounter (Office Visit) with Benson Ventura M.D.   Medication Sig Dispense Refill   • DULoxetine (CYMBALTA) 30 MG Cap DR Particles Take 30 mg by mouth every day.       • Cyanocobalamin (B-12) 1000 MCG SL Tab Place  under tongue.       • Multiple Vitamins-Minerals (CENTRUM SILVER ADULT 50+ PO) Take  by mouth.       • Flaxseed, Linseed, (FLAXSEED OIL) 1200 MG CAPS Take  by mouth.       • Glucosamine-Chondroit-Vit C-Mn (GLUCOSAMINE CHONDR 1500 COMPLX PO) Take  by mouth.       • vitamin D (CHOLECALCIFEROL) 1000 UNIT TABS Take 6,000 Units by mouth every day.       • CALCIUM 600 + D PO Take 1 Tab by mouth 2 Times a Day.                       Current Outpatient Prescriptions on File Prior to Visit    Medication Sig Dispense Refill   • Cyanocobalamin (B-12) 1000 MCG SL Tab Place  under tongue.       • Multiple Vitamins-Minerals (CENTRUM SILVER ADULT 50+ PO) Take  by mouth.       • Flaxseed, Linseed, (FLAXSEED OIL) 1200 MG CAPS Take  by mouth.       • Glucosamine-Chondroit-Vit C-Mn (GLUCOSAMINE CHONDR 1500 COMPLX PO) Take  by mouth.       • vitamin D (CHOLECALCIFEROL) 1000 UNIT TABS Take 6,000 Units by mouth every day.       • CALCIUM 600 + D PO Take 1 Tab by mouth 2 Times a Day.       • non-formulary med Preser Vision       • sertraline (ZOLOFT) 50 MG Tab Take 1 Tab by mouth every day. (Patient not taking: Reported on 6/18/2019) 90 Tab 3   • Cholestyramine Light 4 GM Powder Take 4 g by mouth every day. (Patient not taking: Reported on 6/27/2019) 3 Can 3   • ascorbic acid (ASCORBIC ACID) 500 MG TABS Take 500 mg by mouth every day.          No current facility-administered medications on file prior to visit.             Allergies:         Allergies   Allergen Reactions   • Other Environmental Runny Nose       hayfever-runny nose         Social Hx:   Social History               Social History   • Marital status:        Spouse name: N/A   • Number of children: N/A   • Years of education: N/A          Occupational History   • Not on file.             Social History Main Topics   • Smoking status: Former Smoker       Packs/day: 1.00       Years: 30.00       Types: Cigarettes       Quit date: 1/1/1992   • Smokeless tobacco: Never Used         Comment: 1992   • Alcohol use 7.0 oz/week       14 Glasses of wine per week         Comment: 10-12 weekly wine/natalya.  denies hx of detox   • Drug use: No   • Sexual activity: Yes       Partners: Male       Birth control/ protection: Post-Menopausal         Comment: , retired            Other Topics Concern   •  Service No   • Blood Transfusions No   • Caffeine Concern Yes   • Occupational Exposure No   • Hobby Hazards No   • Sleep Concern No   • Stress  "Concern No   • Weight Concern No   • Special Diet No   • Back Care No   • Exercise Yes       golfs 4 times per week   • Bike Helmet No   • Seat Belt Yes   • Self-Exams No          Social History Narrative     , retired, spends half the year in [facility]               EXAMINATION      Physical Exam:   Vitals: /64 (BP Location: Left arm, Patient Position: Sitting, BP Cuff Size: Adult)   Pulse 76   Temp 36.7 °C (98 °F) (Temporal)   Ht 1.651 m (5' 5\")   Wt 69.6 kg (153 lb 7 oz)   SpO2 95%      Constitutional:   Body Habitus: Body mass index is 25.53 kg/m².  Cooperation: Fully cooperates with exam  Appearance: Well-groomed no disheveled     Respiratory-  breathing comfortable on room air, no audible wheezing  Cardiovascular- capillary refills less than 2 seconds. No lower extremity edema is noted.   Psychiatric- alert and oriented ×3. Normal affect.    MSK: -There are no signs of infection around the injection sites.  Mild tenderness palpation around the cervical facet joints in the upper region on the left.  Decreased range of motion of the cervical spine.              MEDICAL DECISION MAKING     DATA     Labs:         Lab Results   Component Value Date/Time     SODIUM 139 10/15/2018 09:33 AM     POTASSIUM 4.1 10/15/2018 09:33 AM     CHLORIDE 106 10/15/2018 09:33 AM     CO2 26 10/15/2018 09:33 AM     GLUCOSE 91 10/15/2018 09:33 AM     BUN 19 10/15/2018 09:33 AM     CREATININE 0.80 10/15/2018 09:33 AM     CREATININE 0.9 10/23/2007 07:36 AM               Lab Results   Component Value Date/Time     PROTHROMBTM 12.1 09/02/2008 02:48 PM     INR 1.04 09/02/2008 02:48 PM               Lab Results   Component Value Date/Time     WBC 4.5 (L) 05/13/2019 07:17 AM     WBC 4.4 01/26/2011 07:20 AM     WBC 4.4 01/26/2011 07:20 AM     RBC 4.12 (L) 05/13/2019 07:17 AM     RBC 4.07 01/26/2011 07:20 AM     RBC 4.07 01/26/2011 07:20 AM     HEMOGLOBIN 12.8 05/13/2019 07:17 AM     HEMATOCRIT 40.9 05/13/2019 07:17 AM     " MCV 99.3 (H) 05/13/2019 07:17 AM     MCV 94 01/26/2011 07:20 AM     MCV 94 01/26/2011 07:20 AM     MCH 31.1 05/13/2019 07:17 AM     MCH 31.7 01/26/2011 07:20 AM     MCH 31.7 01/26/2011 07:20 AM     MCHC 31.3 (L) 05/13/2019 07:17 AM     MPV 11.0 05/13/2019 07:17 AM     NEUTSPOLYS 53.10 05/13/2019 07:17 AM     LYMPHOCYTES 38.00 05/13/2019 07:17 AM     MONOCYTES 6.70 05/13/2019 07:17 AM     EOSINOPHILS 1.30 05/13/2019 07:17 AM     BASOPHILS 0.90 05/13/2019 07:17 AM         No results found for: HBA1C         DIAGNOSIS        Visit Diagnoses       ICD-10-CM   1. Cervical spinal stenosis M48.02   2. Cervicogenic headache R51   3. Spondylosis of cervical region without myelopathy or radiculopathy M47.812            ASSESSMENT and PLAN:     Yajaira Eduardo 74 y.o. female       Yajaira was seen today for follow-up.     Diagnoses and all orders for this visit:     Cervical spinal stenosis     Cervicogenic headache     Spondylosis of cervical region without myelopathy or radiculopathy  -     REFERRAL TO PHYSICIAL MEDICINE REHAB           Positive diagnostic medial branch block x2 targeting the left C2-3 and C3-4 facet joints.  I scheduled the patient for a radiofrequency neurotomy of the medial branches nerves innervating that left C2-3 and C3-4 facet joints.      The risks benefits and alternatives to this procedure were discussed and the patient wishes to proceed with the procedure. Risks include but are not limited to damage to surrounding structures, infection, bleeding, worsening of pain which can be permanent, weakness which can be permanent. Benefits include pain relief, improved function. Alternatives includes not doing the procedure.       I also discussed the case the patient's .     The patient may take Tylenol 1000 mg 3 times daily post procedure.  If it is okay with her surgeon she may also take ibuprofen 800 mg 3 times daily for the first 7 days after the above procedure     The patient also has an  upcoming hip replacement.  I wrote a note for the patient to take to her surgeon for the preop visit to discuss the procedure that I am planning to see if this is amenable to doing prior to her hip replacement.     Follow up: 6 weeks after the above procedure     Thank you for allowing me to participate in the care of this patient. If you have any questions please not hesitate to contact me.           Please note that this dictation was created using voice recognition software. I have made every reasonable attempt to correct obvious errors but there may be errors of grammar and content that I may have overlooked prior to finalization of this note.        Benson Ventura MD  Interventional Spine and Sports Physiatry  Physical Medicine and Rehabilitation  [facility]       ADDENDUM     No significant changes. Patient is not on anticoagulation, antibiotics, antiplatelet drugs. Still with left sided neck pain and headaches worse with neck movements.       Vitals:    08/05/19 1700 08/05/19 1705 08/05/19 1716 08/05/19 1730   BP: 137/60 135/64 (!) 174/84 156/84   Pulse: 72 70 73 69   Resp: 16 16 15 17   Temp:       SpO2: 92% 92% 95% 98%   Weight:       Height:          Gen: NAD  Resp: Clear to auscultation bilaterally, no wheezing. Breathing comfortable on room air.  Cardiovascular: Regular rate and rhythm, no murmurs rubs or gallops.       PMHx:   Past Medical History:   Diagnosis Date   • Abnormal mammogram, unspecified     5/13/09 Diagnostic mammo L breast:  Negative (next 11/13/09)   • Cervicalgia     controlled   • Current moderate episode of major depressive disorder without prior episode (HCC) 5/20/2019   • Cystocele, midline     uncontrolled   • Degenerative disc disease, cervical 5/20/2019   • Diarrhea following gastrointestinal surgery 4/26/2010   • Fracture closed, mandible     after trauma   • Glucose intolerance (impaired glucose tolerance) 10/23/2017   • Lactose intolerance    • Leukopenia 10/26/2010    • Multinodular goiter (nontoxic) 5/20/2019   • OA (osteoarthritis) 4/20/2010   • Postmenopausal atrophic vaginitis 4/26/2010   • Pure hypercholesterolemia 4/26/2010   • S/P colonoscopy     1996: Normal   • Seasonal and perennial allergic rhinoconjunctivitis     hayfever, no rx   • Thoracic or lumbosacral neuritis or radiculitis, unspecified     controlled   • Unspecified vitamin D deficiency 4/26/2010   • Urinary bladder disorder        PSHx:   Past Surgical History:   Procedure Laterality Date   • COLPOSACROPEXY ROBOTIC  8/22/2013    Performed by Elisa Jones M.D. at SURGERY [facility]   • CYSTOSCOPY  8/22/2013    Performed by Elisa Jones M.D. at SURGERY [facility]   • BLADDER SLING FEMALE     • BREAST BIOPSY      hx of left breast biopsy-benign   • PHU BY LAPAROSCOPY     • HYSTERECTOMY, VAGINAL      without BSO   • TONSILLECTOMY     • US-NEEDLE CORE BX-BREAST PANEL         Family history   Family History   Problem Relation Age of Onset   • Cancer Mother    • Diabetes Father    • Heart Disease Father         Atrial Fib         Medications:   Current Outpatient Medications   Medication   • DULoxetine (CYMBALTA) 30 MG Cap DR Particles   • Cyanocobalamin (B-12) 1000 MCG SL Tab   • non-formulary med   • sertraline (ZOLOFT) 50 MG Tab   • Cholestyramine Light 4 GM Powder   • Multiple Vitamins-Minerals (CENTRUM SILVER ADULT 50+ PO)   • Flaxseed, Linseed, (FLAXSEED OIL) 1200 MG CAPS   • Glucosamine-Chondroit-Vit C-Mn (GLUCOSAMINE CHONDR 1500 COMPLX PO)   • ascorbic acid (ASCORBIC ACID) 500 MG TABS   • vitamin D (CHOLECALCIFEROL) 1000 UNIT TABS   • CALCIUM 600 + D PO     No current facility-administered medications for this encounter.        Allergies:   Allergies   Allergen Reactions   • Other Environmental Runny Nose     hayfever-runny nose       Social Hx:   Social History     Socioeconomic History   • Marital status:      Spouse name: Not on file   • Number of children: Not on file    • Years of education: Not on file   • Highest education level: Not on file   Occupational History   • Not on file   Social Needs   • Financial resource strain: Not on file   • Food insecurity:     Worry: Not on file     Inability: Not on file   • Transportation needs:     Medical: Not on file     Non-medical: Not on file   Tobacco Use   • Smoking status: Former Smoker     Packs/day: 1.00     Years: 30.00     Pack years: 30.00     Types: Cigarettes     Last attempt to quit: 1992     Years since quittin.6   • Smokeless tobacco: Never Used   • Tobacco comment:    Substance and Sexual Activity   • Alcohol use: Yes     Alcohol/week: 7.0 oz     Types: 14 Glasses of wine per week     Comment: 10-12 weekly wine/natalya.  denies hx of detox   • Drug use: No   • Sexual activity: Yes     Partners: Male     Birth control/protection: Post-Menopausal     Comment: , retired   Lifestyle   • Physical activity:     Days per week: Not on file     Minutes per session: Not on file   • Stress: Not on file   Relationships   • Social connections:     Talks on phone: Not on file     Gets together: Not on file     Attends Denominational service: Not on file     Active member of club or organization: Not on file     Attends meetings of clubs or organizations: Not on file     Relationship status: Not on file   • Intimate partner violence:     Fear of current or ex partner: Not on file     Emotionally abused: Not on file     Physically abused: Not on file     Forced sexual activity: Not on file   Other Topics Concern   •  Service No   • Blood Transfusions No   • Caffeine Concern Yes   • Occupational Exposure No   • Hobby Hazards No   • Sleep Concern No   • Stress Concern No   • Weight Concern No   • Special Diet No   • Back Care No   • Exercise Yes     Comment: golfs 4 times per week   • Bike Helmet No   • Seat Belt Yes   • Self-Exams No   Social History Narrative    , retired, spends half the year in [facility]           Okay to proceed with LEFT Medial Branch Radiofrequency neurotomy targeting the C2-3 and C3-4 facet joints       Benson Ventura MD  Physical Medicine and Rehabilitation  Interventional Spine and Sports Physiatry  [facility]  8/11/2019  4:32 PM

## 2019-09-09 ENCOUNTER — OFFICE VISIT (OUTPATIENT)
Dept: PHYSICAL MEDICINE AND REHAB | Facility: MEDICAL CENTER | Age: 74
End: 2019-09-09
Payer: MEDICARE

## 2019-09-09 VITALS
BODY MASS INDEX: 25.09 KG/M2 | DIASTOLIC BLOOD PRESSURE: 78 MMHG | SYSTOLIC BLOOD PRESSURE: 138 MMHG | OXYGEN SATURATION: 98 % | WEIGHT: 150.57 LBS | HEIGHT: 65 IN | TEMPERATURE: 97 F | HEART RATE: 86 BPM

## 2019-09-09 DIAGNOSIS — G44.86 CERVICOGENIC HEADACHE: ICD-10-CM

## 2019-09-09 DIAGNOSIS — M48.02 CERVICAL SPINAL STENOSIS: ICD-10-CM

## 2019-09-09 DIAGNOSIS — M47.812 SPONDYLOSIS OF CERVICAL REGION WITHOUT MYELOPATHY OR RADICULOPATHY: ICD-10-CM

## 2019-09-09 PROCEDURE — 99213 OFFICE O/P EST LOW 20 MIN: CPT | Performed by: PHYSICAL MEDICINE & REHABILITATION

## 2019-09-09 ASSESSMENT — PATIENT HEALTH QUESTIONNAIRE - PHQ9
7. TROUBLE CONCENTRATING ON THINGS, SUCH AS READING THE NEWSPAPER OR WATCHING TELEVISION: 0
SUM OF ALL RESPONSES TO PHQ QUESTIONS 1-9: 0
2. FEELING DOWN, DEPRESSED, IRRITABLE, OR HOPELESS: 0
SUM OF ALL RESPONSES TO PHQ9 QUESTIONS 1 AND 2: 0
4. FEELING TIRED OR HAVING LITTLE ENERGY: 0
3. TROUBLE FALLING OR STAYING ASLEEP OR SLEEPING TOO MUCH: 0
6. FEELING BAD ABOUT YOURSELF - OR THAT YOU ARE A FAILURE OR HAVE LET YOURSELF OR YOUR FAMILY DOWN: 0
8. MOVING OR SPEAKING SO SLOWLY THAT OTHER PEOPLE COULD HAVE NOTICED. OR THE OPPOSITE, BEING SO FIGETY OR RESTLESS THAT YOU HAVE BEEN MOVING AROUND A LOT MORE THAN USUAL: 0
9. THOUGHTS THAT YOU WOULD BE BETTER OFF DEAD, OR OF HURTING YOURSELF: 0
1. LITTLE INTEREST OR PLEASURE IN DOING THINGS: 0
5. POOR APPETITE OR OVEREATING: 0

## 2019-09-09 ASSESSMENT — PAIN SCALES - GENERAL: PAINLEVEL: NO PAIN

## 2019-09-09 NOTE — Clinical Note
Denisse Hammer had near complete resolution of her headaches and neck pain after the radiofrequency neurotomy.Benson

## 2019-09-09 NOTE — PROGRESS NOTES
Follow up patient note  Interventional spine and sports physiatry, Physical medicine rehabilitation      Chief complaint:   Chief Complaint   Patient presents with   • Follow-Up     Neck pain         HISTORY    Please see new patient note dated  by Dr Ventura,  for more details.     HPI  Patient identification: Yajaira Eduardo 74 y.o. female  With Diagnoses of Cervical spinal stenosis, Cervicogenic headache, and Spondylosis of cervical region without myelopathy or radiculopathy were pertinent to this visit.   Diagnostic medial branch block targeting the left C2-3 and C3-4 facet joints done on 6/24/2019 and 6/26/2019 with 100% pain relief post procedure.  8/5/2019 radiofrequency neurotomy targeting the left C2-3 and C3-4 facet joints with 100% pain relief for the post procedural visit.    Status post the above procedures now with complete resolution of her pain in the left side of the neck and headaches after the radiofrequency neurotomy targeting left C2-3 and C3-4 facet joints with cooled radio frequency neurotomy.  The patient is very happy with these results.  Range of motion of the cervical spine is restored.  Function is restored she is very happy with this.  She does get occasional pain in the neck and left side of the head, 1/10 in intensity, intermittent, aching quality but this usually resolves on its own within a few seconds.       ROS Red Flags :   Fever, Chills, Sweats: Denies  Involuntary Weight Loss: Denies  Bowel/Bladder Incontinence: Denies  Saddle Anesthesia: Denies        PMHx:   Past Medical History:   Diagnosis Date   • Abnormal mammogram, unspecified     5/13/09 Diagnostic mammo L breast:  Negative (next 11/13/09)   • Cervicalgia     controlled   • Current moderate episode of major depressive disorder without prior episode ([facility]) 5/20/2019   • Cystocele, midline     uncontrolled   • Degenerative disc disease, cervical 5/20/2019   • Diarrhea following gastrointestinal surgery 4/26/2010   •  Fracture closed, mandible     after trauma   • Glucose intolerance (impaired glucose tolerance) 10/23/2017   • Lactose intolerance    • Leukopenia 10/26/2010   • Multinodular goiter (nontoxic) 5/20/2019   • OA (osteoarthritis) 4/20/2010   • Postmenopausal atrophic vaginitis 4/26/2010   • Pure hypercholesterolemia 4/26/2010   • S/P colonoscopy     1996: Normal   • Seasonal and perennial allergic rhinoconjunctivitis     hayfever, no rx   • Thoracic or lumbosacral neuritis or radiculitis, unspecified     controlled   • Unspecified vitamin D deficiency 4/26/2010   • Urinary bladder disorder        PSHx:   Past Surgical History:   Procedure Laterality Date   • COLPOSACROPEXY ROBOTIC  8/22/2013    Performed by Elisa Jones M.D. at SURGERY [facility]   • CYSTOSCOPY  8/22/2013    Performed by Elisa Jones M.D. at SURGERY [facility]   • BLADDER SLING FEMALE     • BREAST BIOPSY      hx of left breast biopsy-benign   • PHU BY LAPAROSCOPY     • HYSTERECTOMY, VAGINAL      without BSO   • TONSILLECTOMY     • US-NEEDLE CORE BX-BREAST PANEL         Family history   Family History   Problem Relation Age of Onset   • Cancer Mother    • Diabetes Father    • Heart Disease Father         Atrial Fib         Medications:   Outpatient Medications Marked as Taking for the 9/9/19 encounter (Office Visit) with Benson Ventura M.D.   Medication Sig Dispense Refill   • DULoxetine (CYMBALTA) 30 MG Cap DR Particles Take 30 mg by mouth every day.     • Cyanocobalamin (B-12) 1000 MCG SL Tab Place  under tongue.     • non-formulary med Preser Vision     • sertraline (ZOLOFT) 50 MG Tab Take 1 Tab by mouth every day. 90 Tab 3   • Multiple Vitamins-Minerals (CENTRUM SILVER ADULT 50+ PO) Take  by mouth.     • Flaxseed, Linseed, (FLAXSEED OIL) 1200 MG CAPS Take  by mouth.     • Glucosamine-Chondroit-Vit C-Mn (GLUCOSAMINE CHONDR 1500 COMPLX PO) Take  by mouth.     • ascorbic acid (ASCORBIC ACID) 500 MG TABS Take 500 mg by mouth  every day.     • vitamin D (CHOLECALCIFEROL) 1000 UNIT TABS Take 6,000 Units by mouth every day.     • CALCIUM 600 + D PO Take 1 Tab by mouth 2 Times a Day.          Current Outpatient Medications on File Prior to Visit   Medication Sig Dispense Refill   • DULoxetine (CYMBALTA) 30 MG Cap DR Particles Take 30 mg by mouth every day.     • Cyanocobalamin (B-12) 1000 MCG SL Tab Place  under tongue.     • non-formulary med Preser Vision     • sertraline (ZOLOFT) 50 MG Tab Take 1 Tab by mouth every day. 90 Tab 3   • Multiple Vitamins-Minerals (CENTRUM SILVER ADULT 50+ PO) Take  by mouth.     • Flaxseed, Linseed, (FLAXSEED OIL) 1200 MG CAPS Take  by mouth.     • Glucosamine-Chondroit-Vit C-Mn (GLUCOSAMINE CHONDR 1500 COMPLX PO) Take  by mouth.     • ascorbic acid (ASCORBIC ACID) 500 MG TABS Take 500 mg by mouth every day.     • vitamin D (CHOLECALCIFEROL) 1000 UNIT TABS Take 6,000 Units by mouth every day.     • CALCIUM 600 + D PO Take 1 Tab by mouth 2 Times a Day.     • Cholestyramine Light 4 GM Powder Take 4 g by mouth every day. (Patient not taking: Reported on 6/27/2019) 3 Can 3     No current facility-administered medications on file prior to visit.          Allergies:   Allergies   Allergen Reactions   • Other Environmental Runny Nose     hayfever-runny nose       Social Hx:   Social History     Socioeconomic History   • Marital status:      Spouse name: Not on file   • Number of children: Not on file   • Years of education: Not on file   • Highest education level: Not on file   Occupational History   • Not on file   Social Needs   • Financial resource strain: Not on file   • Food insecurity:     Worry: Not on file     Inability: Not on file   • Transportation needs:     Medical: Not on file     Non-medical: Not on file   Tobacco Use   • Smoking status: Former Smoker     Packs/day: 1.00     Years: 30.00     Pack years: 30.00     Types: Cigarettes     Last attempt to quit: 1/1/1992     Years since quitting:  "27.7   • Smokeless tobacco: Never Used   • Tobacco comment: 1992   Substance and Sexual Activity   • Alcohol use: Yes     Alcohol/week: 7.0 oz     Types: 14 Glasses of wine per week     Comment: 10-12 weekly wine/natalya.  denies hx of detox   • Drug use: No   • Sexual activity: Yes     Partners: Male     Birth control/protection: Post-Menopausal     Comment: , retired   Lifestyle   • Physical activity:     Days per week: Not on file     Minutes per session: Not on file   • Stress: Not on file   Relationships   • Social connections:     Talks on phone: Not on file     Gets together: Not on file     Attends Rastafarian service: Not on file     Active member of club or organization: Not on file     Attends meetings of clubs or organizations: Not on file     Relationship status: Not on file   • Intimate partner violence:     Fear of current or ex partner: Not on file     Emotionally abused: Not on file     Physically abused: Not on file     Forced sexual activity: Not on file   Other Topics Concern   •  Service No   • Blood Transfusions No   • Caffeine Concern Yes   • Occupational Exposure No   • Hobby Hazards No   • Sleep Concern No   • Stress Concern No   • Weight Concern No   • Special Diet No   • Back Care No   • Exercise Yes     Comment: golfs 4 times per week   • Bike Helmet No   • Seat Belt Yes   • Self-Exams No   Social History Narrative    , retired, spends half the year in [facility]         EXAMINATION     Physical Exam:   Vitals: /78 (BP Location: Left arm, Patient Position: Sitting, BP Cuff Size: Adult)   Pulse 86   Temp 36.1 °C (97 °F) (Temporal)   Ht 1.645 m (5' 4.75\")   Wt 68.3 kg (150 lb 9.2 oz)   SpO2 98%     Constitutional:   Body Habitus: Body mass index is 25.25 kg/m².  Cooperation: Fully cooperates with exam  Appearance: Well-groomed no disheveled    Respiratory-  breathing comfortable on room air, no audible wheezing  Cardiovascular- capillary refills less than 2 " seconds. No lower extremity edema is noted.   Psychiatric- alert and oriented ×3. Normal affect.    MSK: -Full range of motion the cervical spine with no pain.  No significant tenderness palpation around the cervical facet joints.  Spurling's maneuver is negative.        Key points for the international standards for neurological classification of spinal cord injury (ISNCSCI) to light touch.     Dermatome R L   C4 2 2   C5 2 2   C6 2 2   C7 2 2   C8 2 2   T1 2 2   T2 2 2                                      Motor Exam Upper Extremities   ? Myotome R L   Shoulder flexion C5 5 5   Elbow flexion C5 5 5   Wrist extension C6 5 5   Elbow extension C7 5 5   Finger flexion C8 5 5   Finger abduction T1 5 5                 MEDICAL DECISION MAKING    DATA    Labs:   Lab Results   Component Value Date/Time    SODIUM 139 10/15/2018 09:33 AM    POTASSIUM 4.1 10/15/2018 09:33 AM    CHLORIDE 106 10/15/2018 09:33 AM    CO2 26 10/15/2018 09:33 AM    GLUCOSE 91 10/15/2018 09:33 AM    BUN 19 10/15/2018 09:33 AM    CREATININE 0.80 10/15/2018 09:33 AM    CREATININE 0.9 10/23/2007 07:36 AM        Lab Results   Component Value Date/Time    PROTHROMBTM 12.1 09/02/2008 02:48 PM    INR 1.04 09/02/2008 02:48 PM        Lab Results   Component Value Date/Time    WBC 4.5 (L) 05/13/2019 07:17 AM    WBC 4.4 01/26/2011 07:20 AM    WBC 4.4 01/26/2011 07:20 AM    RBC 4.12 (L) 05/13/2019 07:17 AM    RBC 4.07 01/26/2011 07:20 AM    RBC 4.07 01/26/2011 07:20 AM    HEMOGLOBIN 12.8 05/13/2019 07:17 AM    HEMATOCRIT 40.9 05/13/2019 07:17 AM    MCV 99.3 (H) 05/13/2019 07:17 AM    MCV 94 01/26/2011 07:20 AM    MCV 94 01/26/2011 07:20 AM    MCH 31.1 05/13/2019 07:17 AM    MCH 31.7 01/26/2011 07:20 AM    MCH 31.7 01/26/2011 07:20 AM    MCHC 31.3 (L) 05/13/2019 07:17 AM    MPV 11.0 05/13/2019 07:17 AM    NEUTSPOLYS 53.10 05/13/2019 07:17 AM    LYMPHOCYTES 38.00 05/13/2019 07:17 AM    MONOCYTES 6.70 05/13/2019 07:17 AM    EOSINOPHILS 1.30 05/13/2019 07:17 AM     BASOPHILS 0.90 05/13/2019 07:17 AM        No results found for: HBA1C       DIAGNOSIS   Visit Diagnoses     ICD-10-CM   1. Cervical spinal stenosis M48.02   2. Cervicogenic headache R51   3. Spondylosis of cervical region without myelopathy or radiculopathy M47.812         ASSESSMENT and PLAN:     Yajaira Eduardo 74 y.o. female      Yajaira was seen today for follow-up.    Diagnoses and all orders for this visit:    Cervical spinal stenosis  Comments:  Stable, no signs of cervical spinal stenosis currently    Cervicogenic headache  Comments:  Significantly improved and nearly resolved after the radiofrequency neurotomy    Spondylosis of cervical region without myelopathy or radiculopathy  Comments:  Significantly improved and nearly resolved after the radiofrequency neurotomy        Discontinue Tylenol as the patient's pain is currently minimal.  Discontinue ibuprofen.    We discussed strengthening and stretching exercises.    Follow up: 6 months to 1 year for a follow up    Thank you for allowing me to participate in the care of this patient. If you have any questions please not hesitate to contact me.        Please note that this dictation was created using voice recognition software. I have made every reasonable attempt to correct obvious errors but there may be errors of grammar and content that I may have overlooked prior to finalization of this note.      Benson Ventura MD  Interventional Spine and Sports Physiatry  Physical Medicine and Rehabilitation  [facility]  9/9/2019  12:40 PM

## 2019-10-09 DIAGNOSIS — D69.6 THROMBOCYTOPENIA (HCC): ICD-10-CM

## 2019-10-09 DIAGNOSIS — E78.00 PURE HYPERCHOLESTEROLEMIA: Chronic | ICD-10-CM

## 2019-10-09 DIAGNOSIS — R73.02 GLUCOSE INTOLERANCE (IMPAIRED GLUCOSE TOLERANCE): ICD-10-CM

## 2019-10-10 ENCOUNTER — HOSPITAL ENCOUNTER (OUTPATIENT)
Dept: LAB | Facility: MEDICAL CENTER | Age: 74
End: 2019-10-10
Attending: FAMILY MEDICINE
Payer: MEDICARE

## 2019-10-10 DIAGNOSIS — E78.00 PURE HYPERCHOLESTEROLEMIA: Chronic | ICD-10-CM

## 2019-10-10 DIAGNOSIS — D69.6 THROMBOCYTOPENIA (HCC): ICD-10-CM

## 2019-10-10 DIAGNOSIS — R73.02 GLUCOSE INTOLERANCE (IMPAIRED GLUCOSE TOLERANCE): ICD-10-CM

## 2019-10-10 LAB
ALBUMIN SERPL BCP-MCNC: 4.4 G/DL (ref 3.2–4.9)
ALBUMIN/GLOB SERPL: 1.6 G/DL
ALP SERPL-CCNC: 93 U/L (ref 30–99)
ALT SERPL-CCNC: 14 U/L (ref 2–50)
ANION GAP SERPL CALC-SCNC: 11 MMOL/L (ref 0–11.9)
AST SERPL-CCNC: 19 U/L (ref 12–45)
BASOPHILS # BLD AUTO: 0.5 % (ref 0–1.8)
BASOPHILS # BLD: 0.02 K/UL (ref 0–0.12)
BILIRUB SERPL-MCNC: 0.9 MG/DL (ref 0.1–1.5)
BUN SERPL-MCNC: 17 MG/DL (ref 8–22)
CALCIUM SERPL-MCNC: 9.1 MG/DL (ref 8.5–10.5)
CHLORIDE SERPL-SCNC: 104 MMOL/L (ref 96–112)
CHOLEST SERPL-MCNC: 214 MG/DL (ref 100–199)
CO2 SERPL-SCNC: 26 MMOL/L (ref 20–33)
CREAT SERPL-MCNC: 0.87 MG/DL (ref 0.5–1.4)
EOSINOPHIL # BLD AUTO: 0.06 K/UL (ref 0–0.51)
EOSINOPHIL NFR BLD: 1.5 % (ref 0–6.9)
ERYTHROCYTE [DISTWIDTH] IN BLOOD BY AUTOMATED COUNT: 49 FL (ref 35.9–50)
EST. AVERAGE GLUCOSE BLD GHB EST-MCNC: 105 MG/DL
FASTING STATUS PATIENT QL REPORTED: NORMAL
GLOBULIN SER CALC-MCNC: 2.7 G/DL (ref 1.9–3.5)
GLUCOSE SERPL-MCNC: 91 MG/DL (ref 65–99)
HBA1C MFR BLD: 5.3 % (ref 0–5.6)
HCT VFR BLD AUTO: 39.5 % (ref 37–47)
HDLC SERPL-MCNC: 94 MG/DL
HGB BLD-MCNC: 12.9 G/DL (ref 12–16)
IMM GRANULOCYTES # BLD AUTO: 0 K/UL (ref 0–0.11)
IMM GRANULOCYTES NFR BLD AUTO: 0 % (ref 0–0.9)
LDLC SERPL CALC-MCNC: 105 MG/DL
LYMPHOCYTES # BLD AUTO: 1.58 K/UL (ref 1–4.8)
LYMPHOCYTES NFR BLD: 38.3 % (ref 22–41)
MCH RBC QN AUTO: 31.5 PG (ref 27–33)
MCHC RBC AUTO-ENTMCNC: 32.7 G/DL (ref 33.6–35)
MCV RBC AUTO: 96.6 FL (ref 81.4–97.8)
MONOCYTES # BLD AUTO: 0.31 K/UL (ref 0–0.85)
MONOCYTES NFR BLD AUTO: 7.5 % (ref 0–13.4)
NEUTROPHILS # BLD AUTO: 2.16 K/UL (ref 2–7.15)
NEUTROPHILS NFR BLD: 52.2 % (ref 44–72)
NRBC # BLD AUTO: 0 K/UL
NRBC BLD-RTO: 0 /100 WBC
PLATELET # BLD AUTO: 156 K/UL (ref 164–446)
PMV BLD AUTO: 10.6 FL (ref 9–12.9)
POTASSIUM SERPL-SCNC: 3.7 MMOL/L (ref 3.6–5.5)
PROT SERPL-MCNC: 7.1 G/DL (ref 6–8.2)
RBC # BLD AUTO: 4.09 M/UL (ref 4.2–5.4)
SODIUM SERPL-SCNC: 141 MMOL/L (ref 135–145)
TRIGL SERPL-MCNC: 74 MG/DL (ref 0–149)
WBC # BLD AUTO: 4.1 K/UL (ref 4.8–10.8)

## 2019-10-10 PROCEDURE — 36415 COLL VENOUS BLD VENIPUNCTURE: CPT | Mod: GA

## 2019-10-10 PROCEDURE — 83036 HEMOGLOBIN GLYCOSYLATED A1C: CPT | Mod: GA

## 2019-10-10 PROCEDURE — 80061 LIPID PANEL: CPT

## 2019-10-10 PROCEDURE — 80053 COMPREHEN METABOLIC PANEL: CPT

## 2019-10-10 PROCEDURE — 85025 COMPLETE CBC W/AUTO DIFF WBC: CPT

## 2019-10-14 ENCOUNTER — OFFICE VISIT (OUTPATIENT)
Dept: MEDICAL GROUP | Facility: MEDICAL CENTER | Age: 74
End: 2019-10-14
Payer: MEDICARE

## 2019-10-14 VITALS
RESPIRATION RATE: 12 BRPM | DIASTOLIC BLOOD PRESSURE: 82 MMHG | HEIGHT: 65 IN | TEMPERATURE: 97.9 F | HEART RATE: 70 BPM | WEIGHT: 151.9 LBS | OXYGEN SATURATION: 96 % | SYSTOLIC BLOOD PRESSURE: 124 MMHG | BODY MASS INDEX: 25.31 KG/M2

## 2019-10-14 DIAGNOSIS — F32.1 CURRENT MODERATE EPISODE OF MAJOR DEPRESSIVE DISORDER WITHOUT PRIOR EPISODE (HCC): ICD-10-CM

## 2019-10-14 DIAGNOSIS — R73.02 GLUCOSE INTOLERANCE (IMPAIRED GLUCOSE TOLERANCE): ICD-10-CM

## 2019-10-14 DIAGNOSIS — D69.6 THROMBOCYTOPENIA (HCC): ICD-10-CM

## 2019-10-14 DIAGNOSIS — M50.30 DEGENERATIVE DISC DISEASE, CERVICAL: ICD-10-CM

## 2019-10-14 DIAGNOSIS — Z11.59 NEED FOR HEPATITIS C SCREENING TEST: ICD-10-CM

## 2019-10-14 DIAGNOSIS — Z12.31 ENCOUNTER FOR SCREENING MAMMOGRAM FOR BREAST CANCER: ICD-10-CM

## 2019-10-14 PROCEDURE — 99214 OFFICE O/P EST MOD 30 MIN: CPT | Performed by: FAMILY MEDICINE

## 2019-10-14 NOTE — PATIENT INSTRUCTIONS
1.  Duloxetine taper: Take 1 tab every other day x14 days, then 1 tablet every third day x14 days then off

## 2019-10-14 NOTE — ASSESSMENT & PLAN NOTE
This had been a chronic problem for the patient but now doing very well.  She is had her hip replaced and has recovered completely from that she also had her daily headaches taking care of with neck injections.  Her mood is excellent.  Were going to wean off the duloxetine taking 1 every other day x2 weeks then 1 every third day x2 weeks and then stop.  She will let me know if she feels she needs to go back on.

## 2019-10-14 NOTE — ASSESSMENT & PLAN NOTE
This is a chronic health problem that is uncontrolled with current medications and lifestyle measures.  Her platelet count did improve up from 148 back in May to 156.  We will continue to monitor.  Her neutropenia got slightly worse it is down to 4.1 but that is where she runs no signs of anemia hemoglobin 12.9, hematocrit 39.5.

## 2019-10-14 NOTE — ASSESSMENT & PLAN NOTE
This had been a problem for the patient where she ended up getting injections through Dr. Ventura.  She finds her daily headache is now gone.  Once they start to recur she will have to see him again for repeat injections but currently is doing very well.

## 2019-10-14 NOTE — PROGRESS NOTES
CC:Diagnoses of Need for hepatitis C screening test, Glucose intolerance (impaired glucose tolerance), Thrombocytopenia (HCC), Degenerative disc disease, cervical, Current moderate episode of major depressive disorder without prior episode (HCC), and Encounter for screening mammogram for breast cancer were pertinent to this visit.    HISTORY OF PRESENT ILLNESS: Patient is a 74 y.o. female established patient who presents today to talk about her chronic health problems as outlined below.  Patient did her blood work on 10/10/2019.      Glucose intolerance (impaired glucose tolerance)  This had been a problem one time in the past for this patient where they did check her blood sugar because her  is a diabetic and they have a glucometer.  Her blood sugar was high at that time but has not been since then.  Her blood sugar on this set of labs is 91 A1c 5.3.  Were going to resolve this issue.  She does not have the problem.    Thrombocytopenia (HCC)  This is a chronic health problem that is uncontrolled with current medications and lifestyle measures.  Her platelet count did improve up from 148 back in May to 156.  We will continue to monitor.  Her neutropenia got slightly worse it is down to 4.1 but that is where she runs no signs of anemia hemoglobin 12.9, hematocrit 39.5.    Degenerative disc disease, cervical  This had been a problem for the patient where she ended up getting injections through Dr. Ventura.  She finds her daily headache is now gone.  Once they start to recur she will have to see him again for repeat injections but currently is doing very well.    Current moderate episode of major depressive disorder without prior episode (HCC)  This had been a chronic problem for the patient but now doing very well.  She is had her hip replaced and has recovered completely from that she also had her daily headaches taking care of with neck injections.  Her mood is excellent.  Were going to wean off the  duloxetine taking 1 every other day x2 weeks then 1 every third day x2 weeks and then stop.  She will let me know if she feels she needs to go back on.      Patient Active Problem List    Diagnosis Date Noted   • Degenerative disc disease, cervical 2019   • Current moderate episode of major depressive disorder without prior episode ([facility]) 2019   • Multinodular goiter (nontoxic) 2019   • Thrombocytopenia ([facility]) 10/18/2018   • Seasonal allergies 2014   • Chronic neutropenia ([facility]) 10/26/2010   • Postmenopausal atrophic vaginitis 2010   • Pure hypercholesterolemia 2010   • Vitamin D deficiency disease 2010   • Diarrhea following gastrointestinal surgery 2010      Allergies:Other environmental    Current Outpatient Medications   Medication Sig Dispense Refill   • Cyanocobalamin (B-12) 1000 MCG SL Tab Place  under tongue.     • non-formulary med Preser Vision     • sertraline (ZOLOFT) 50 MG Tab Take 1 Tab by mouth every day. 90 Tab 3   • Cholestyramine Light 4 GM Powder Take 4 g by mouth every day. (Patient not taking: Reported on 2019) 3 Can 3   • Multiple Vitamins-Minerals (CENTRUM SILVER ADULT 50+ PO) Take  by mouth.     • Flaxseed, Linseed, (FLAXSEED OIL) 1200 MG CAPS Take  by mouth.     • ascorbic acid (ASCORBIC ACID) 500 MG TABS Take 500 mg by mouth every day.     • vitamin D (CHOLECALCIFEROL) 1000 UNIT TABS Take 6,000 Units by mouth every day.     • CALCIUM 600 + D PO Take 1 Tab by mouth 2 Times a Day.       No current facility-administered medications for this visit.        Social History     Tobacco Use   • Smoking status: Former Smoker     Packs/day: 1.00     Years: 30.00     Pack years: 30.00     Types: Cigarettes     Last attempt to quit: 1992     Years since quittin.8   • Smokeless tobacco: Never Used   • Tobacco comment:    Substance Use Topics   • Alcohol use: Yes     Alcohol/week: 7.0 oz     Types: 14 Glasses of wine per week     Comment:  "10-12 weekly wine/natalya.  denies hx of detox   • Drug use: No     Social History     Social History Narrative    , retired, spends half the year in [facility]       Family History   Problem Relation Age of Onset   • Cancer Mother    • Diabetes Father    • Heart Disease Father         Atrial Fib        ROS:     - Constitutional:  Negative for fever, chills, unexpected weight change, and fatigue/generalized weakness.    - HEENT:  Negative for headaches, vision changes, hearing changes, ear pain, ear discharge, rhinorrhea, sinus congestion, sore throat, and neck pain.      - Respiratory: Negative for cough, sputum production, chest congestion, dyspnea, wheezing, and crackles.      - Cardiovascular: Negative for chest pain, palpitations, orthopnea, and bilateral lower extremity edema.     - Gastrointestinal: Negative for heartburn, nausea, vomiting, abdominal pain, hematochezia, melena, diarrhea, constipation, and greasy/foul-smelling stools.     - Genitourinary: Negative for dysuria, polyuria, hematuria, pyuria, urinary urgency, and urinary incontinence.     - Musculoskeletal: She does complain that when she gets up from a seated position she has some low back pain and some knee pain and stiffness.  Otherwise denies myalgias, back pain,     - Skin: Negative for rash, itching, cyanotic skin color change.     - Neurological: Negative for dizziness, tingling, tremors, focal sensory deficit, focal weakness and headaches.     - Endo/Heme/Allergies: Does not bruise/bleed easily.     - Psychiatric/Behavioral: Negative for depression, suicidal/homicidal ideation and memory loss.          - NOTE: All other systems reviewed and are negative, except as in HPI.      Exam:    /82 (BP Location: Left arm, Patient Position: Sitting, BP Cuff Size: Adult)   Pulse 70   Temp 36.6 °C (97.9 °F) (Temporal)   Resp 12   Ht 1.645 m (5' 4.75\")   Wt 68.9 kg (151 lb 14.4 oz)   SpO2 96%  Body mass index is 25.47 " kg/m².    General:  Well nourished, well developed female in NAD  Head is grossly normal.  Neck: Supple without JVD or bruit. Thyroid is not enlarged.  Pulmonary: Clear to ausculation and percussion.  Normal effort. No rales, ronchi, or wheezing.  Cardiovascular: Regular rate and rhythm without murmur. Carotid and radial pulses are intact and equal bilaterally.  Extremities: no clubbing, cyanosis, or edema.  LABS: 10/10/2019: Results reviewed and discussed with the patient, questions answered.    Please note that this dictation was created using voice recognition software. I have made every reasonable attempt to correct obvious errors, but I expect that there are errors of grammar and possibly content that I did not discover before finalizing the note.    Assessment/Plan:  1. Need for hepatitis C screening test  Patient will check hepatitis C as soon as she gets down to [place]  - Hep C Virus Antibody; Future    2. Glucose intolerance (impaired glucose tolerance)  We will resolve this issue no longer problem    3. Thrombocytopenia (HCC)  Stable, continue to monitor    4. Degenerative disc disease, cervical  Stable, continue to monitor    5. Current moderate episode of major depressive disorder without prior episode (HCC)  Well-controlled, patient planning on stopping her duloxetine over the coming 4 weeks timeframe.  She will let us know if she has any problems with serotonin withdrawal.    6. Encounter for screening mammogram for breast cancer  She will schedule her own mammogram down in [facility].  - MA-SCREENING MAMMO BILAT W/TOMOSYNTHESIS W/CAD; Future

## 2019-10-14 NOTE — ASSESSMENT & PLAN NOTE
This had been a problem one time in the past for this patient where they did check her blood sugar because her  is a diabetic and they have a glucometer.  Her blood sugar was high at that time but has not been since then.  Her blood sugar on this set of labs is 91 A1c 5.3.  Were going to resolve this issue.  She does not have the problem.

## 2019-11-21 ENCOUNTER — PATIENT MESSAGE (OUTPATIENT)
Dept: MEDICAL GROUP | Facility: MEDICAL CENTER | Age: 74
End: 2019-11-21

## 2019-11-22 RX ORDER — DULOXETIN HYDROCHLORIDE 20 MG/1
20 CAPSULE, DELAYED RELEASE ORAL DAILY
Qty: 90 CAP | Refills: 3 | Status: SHIPPED | OUTPATIENT
Start: 2019-11-22 | End: 2020-11-02

## 2019-11-22 NOTE — PATIENT COMMUNICATION
Called patient on the phone who is now currently in [facility] for the winter.  I sent a prescription for duloxetine 20 mg 1 daily #90 with 3 refills to the [facility] in [facility].

## 2020-03-20 ENCOUNTER — OFFICE VISIT (OUTPATIENT)
Dept: URBAN - METROPOLITAN AREA CLINIC 48 | Facility: CLINIC | Age: 75
End: 2020-03-20
Payer: MEDICARE

## 2020-03-20 DIAGNOSIS — H40.033 ANATOMICAL NARROW ANGLE, BILATERAL: Primary | ICD-10-CM

## 2020-03-20 DIAGNOSIS — H35.3132 NONEXUDATIVE AGE-RELATED MACULAR DEGENERATION, BILATERAL, INTERMEDIATE DRY STAGE: ICD-10-CM

## 2020-03-20 PROCEDURE — 92002 INTRM OPH EXAM NEW PATIENT: CPT | Performed by: OPHTHALMOLOGY

## 2020-03-20 PROCEDURE — 92134 CPTRZ OPH DX IMG PST SGM RTA: CPT | Performed by: OPHTHALMOLOGY

## 2020-03-20 PROCEDURE — 92020 GONIOSCOPY: CPT | Performed by: OPHTHALMOLOGY

## 2020-03-20 RX ORDER — PREDNISOLONE ACETATE 10 MG/ML
1 % SUSPENSION/ DROPS OPHTHALMIC
Qty: 5 | Refills: 0 | Status: INACTIVE
Start: 2020-03-20 | End: 2020-03-20

## 2020-03-20 ASSESSMENT — INTRAOCULAR PRESSURE
OS: 16
OD: 16

## 2020-03-20 NOTE — IMPRESSION/PLAN
Impression: Anatomical narrow angle, bilateral: H40.033. Plan: Narrow angles, intraocular pressure stable but patient at increased risk of narrow angle glaucoma. Recommend LPI. r/b/a discussed. questions answered. patient wishes to proceed with surgery. Patient to use PF qid OU starting 1 day after laser. schedule LPI OU, Both eyes same day,  rl2 Please schedule with Dr. Ira Jacques next Wednesday, Both Dr. Patti Joaquin and Dr. Ira Jacques examined the patient and both believe this is and urgent matter, patient may go into angle closure and then can become a emergency.

## 2020-03-20 NOTE — IMPRESSION/PLAN
Impression: Nonexudative age-related macular degeneration, bilateral, intermediate dry stage: H35.3132. Plan: Macular degeneration, dry type with stable vision. Will continue to monitor vision and the patient has been instructed to call with any vision changes. ABI and AREDS2 discussed with patient. Patient taking Preservision, [place] to take per Dr. Janae Hansen Will dilate after LPI laser is done,

## 2020-03-25 ENCOUNTER — POST-OPERATIVE VISIT (OUTPATIENT)
Dept: URBAN - METROPOLITAN AREA CLINIC 48 | Facility: CLINIC | Age: 75
End: 2020-03-25
Payer: MEDICARE

## 2020-03-25 ENCOUNTER — SURGERY (OUTPATIENT)
Dept: URBAN - METROPOLITAN AREA SURGERY 26 | Facility: SURGERY | Age: 75
End: 2020-03-25
Payer: MEDICARE

## 2020-03-25 PROCEDURE — 99024 POSTOP FOLLOW-UP VISIT: CPT | Performed by: OPHTHALMOLOGY

## 2020-03-30 ENCOUNTER — POST-OPERATIVE VISIT (OUTPATIENT)
Dept: URBAN - METROPOLITAN AREA CLINIC 48 | Facility: CLINIC | Age: 75
End: 2020-03-30
Payer: MEDICARE

## 2020-03-30 DIAGNOSIS — Z09 ENCNTR FOR F/U EXAM AFT TRTMT FOR COND OTH THAN MALIG NEOPLM: Primary | ICD-10-CM

## 2020-03-30 PROCEDURE — 99024 POSTOP FOLLOW-UP VISIT: CPT | Performed by: OPHTHALMOLOGY

## 2020-04-21 ENCOUNTER — OFFICE VISIT (OUTPATIENT)
Dept: URBAN - METROPOLITAN AREA CLINIC 48 | Facility: CLINIC | Age: 75
End: 2020-04-21
Payer: MEDICARE

## 2020-04-21 PROCEDURE — 92012 INTRM OPH EXAM EST PATIENT: CPT | Performed by: OPHTHALMOLOGY

## 2020-04-21 ASSESSMENT — INTRAOCULAR PRESSURE
OS: 15
OD: 15

## 2020-05-03 ENCOUNTER — PATIENT MESSAGE (OUTPATIENT)
Dept: MEDICAL GROUP | Facility: PHYSICIAN GROUP | Age: 75
End: 2020-05-03

## 2020-05-19 DIAGNOSIS — E78.00 PURE HYPERCHOLESTEROLEMIA: Chronic | ICD-10-CM

## 2020-05-19 DIAGNOSIS — D70.9 CHRONIC NEUTROPENIA (HCC): ICD-10-CM

## 2020-05-19 DIAGNOSIS — D69.6 THROMBOCYTOPENIA (HCC): ICD-10-CM

## 2020-05-22 ENCOUNTER — HOSPITAL ENCOUNTER (OUTPATIENT)
Dept: LAB | Facility: MEDICAL CENTER | Age: 75
End: 2020-05-22
Attending: FAMILY MEDICINE
Payer: MEDICARE

## 2020-05-22 DIAGNOSIS — D69.6 THROMBOCYTOPENIA (HCC): ICD-10-CM

## 2020-05-22 DIAGNOSIS — E78.00 PURE HYPERCHOLESTEROLEMIA: Chronic | ICD-10-CM

## 2020-05-22 DIAGNOSIS — Z11.59 NEED FOR HEPATITIS C SCREENING TEST: ICD-10-CM

## 2020-05-22 DIAGNOSIS — D70.9 CHRONIC NEUTROPENIA (HCC): ICD-10-CM

## 2020-05-22 LAB
ALBUMIN SERPL BCP-MCNC: 4.5 G/DL (ref 3.2–4.9)
ALBUMIN/GLOB SERPL: 1.6 G/DL
ALP SERPL-CCNC: 89 U/L (ref 30–99)
ALT SERPL-CCNC: 18 U/L (ref 2–50)
ANION GAP SERPL CALC-SCNC: 13 MMOL/L (ref 7–16)
AST SERPL-CCNC: 22 U/L (ref 12–45)
BASOPHILS # BLD AUTO: 0.5 % (ref 0–1.8)
BASOPHILS # BLD: 0.02 K/UL (ref 0–0.12)
BILIRUB SERPL-MCNC: 0.8 MG/DL (ref 0.1–1.5)
BUN SERPL-MCNC: 16 MG/DL (ref 8–22)
CALCIUM SERPL-MCNC: 9.9 MG/DL (ref 8.5–10.5)
CHLORIDE SERPL-SCNC: 102 MMOL/L (ref 96–112)
CHOLEST SERPL-MCNC: 199 MG/DL (ref 100–199)
CO2 SERPL-SCNC: 25 MMOL/L (ref 20–33)
CREAT SERPL-MCNC: 0.75 MG/DL (ref 0.5–1.4)
EOSINOPHIL # BLD AUTO: 0.08 K/UL (ref 0–0.51)
EOSINOPHIL NFR BLD: 1.9 % (ref 0–6.9)
ERYTHROCYTE [DISTWIDTH] IN BLOOD BY AUTOMATED COUNT: 50.8 FL (ref 35.9–50)
FASTING STATUS PATIENT QL REPORTED: NORMAL
GLOBULIN SER CALC-MCNC: 2.8 G/DL (ref 1.9–3.5)
GLUCOSE SERPL-MCNC: 96 MG/DL (ref 65–99)
HCT VFR BLD AUTO: 40.5 % (ref 37–47)
HDLC SERPL-MCNC: 108 MG/DL
HGB BLD-MCNC: 13.3 G/DL (ref 12–16)
IMM GRANULOCYTES # BLD AUTO: 0.01 K/UL (ref 0–0.11)
IMM GRANULOCYTES NFR BLD AUTO: 0.2 % (ref 0–0.9)
LDLC SERPL CALC-MCNC: 78 MG/DL
LYMPHOCYTES # BLD AUTO: 1.51 K/UL (ref 1–4.8)
LYMPHOCYTES NFR BLD: 35.6 % (ref 22–41)
MCH RBC QN AUTO: 32.2 PG (ref 27–33)
MCHC RBC AUTO-ENTMCNC: 32.8 G/DL (ref 33.6–35)
MCV RBC AUTO: 98.1 FL (ref 81.4–97.8)
MONOCYTES # BLD AUTO: 0.28 K/UL (ref 0–0.85)
MONOCYTES NFR BLD AUTO: 6.6 % (ref 0–13.4)
MORPHOLOGY BLD-IMP: NORMAL
NEUTROPHILS # BLD AUTO: 2.34 K/UL (ref 2–7.15)
NEUTROPHILS NFR BLD: 55.2 % (ref 44–72)
NRBC # BLD AUTO: 0 K/UL
NRBC BLD-RTO: 0 /100 WBC
PLATELET # BLD AUTO: 96 K/UL (ref 164–446)
PMV BLD AUTO: 11.4 FL (ref 9–12.9)
POTASSIUM SERPL-SCNC: 4.5 MMOL/L (ref 3.6–5.5)
PROT SERPL-MCNC: 7.3 G/DL (ref 6–8.2)
RBC # BLD AUTO: 4.13 M/UL (ref 4.2–5.4)
SODIUM SERPL-SCNC: 140 MMOL/L (ref 135–145)
TRIGL SERPL-MCNC: 63 MG/DL (ref 0–149)
WBC # BLD AUTO: 4.2 K/UL (ref 4.8–10.8)

## 2020-05-22 PROCEDURE — 36415 COLL VENOUS BLD VENIPUNCTURE: CPT

## 2020-05-22 PROCEDURE — 86803 HEPATITIS C AB TEST: CPT

## 2020-05-22 PROCEDURE — 80061 LIPID PANEL: CPT

## 2020-05-22 PROCEDURE — 80053 COMPREHEN METABOLIC PANEL: CPT

## 2020-05-22 PROCEDURE — 85025 COMPLETE CBC W/AUTO DIFF WBC: CPT

## 2020-05-23 LAB — HCV AB SER QL: NORMAL

## 2020-06-01 ENCOUNTER — OFFICE VISIT (OUTPATIENT)
Dept: MEDICAL GROUP | Facility: PHYSICIAN GROUP | Age: 75
End: 2020-06-01
Payer: MEDICARE

## 2020-06-01 VITALS
SYSTOLIC BLOOD PRESSURE: 110 MMHG | HEART RATE: 80 BPM | OXYGEN SATURATION: 96 % | TEMPERATURE: 98.3 F | RESPIRATION RATE: 20 BRPM | BODY MASS INDEX: 25.46 KG/M2 | WEIGHT: 152.8 LBS | HEIGHT: 65 IN | DIASTOLIC BLOOD PRESSURE: 62 MMHG

## 2020-06-01 DIAGNOSIS — F32.1 CURRENT MODERATE EPISODE OF MAJOR DEPRESSIVE DISORDER WITHOUT PRIOR EPISODE (HCC): ICD-10-CM

## 2020-06-01 DIAGNOSIS — Z12.31 ENCOUNTER FOR SCREENING MAMMOGRAM FOR BREAST CANCER: ICD-10-CM

## 2020-06-01 DIAGNOSIS — E78.00 PURE HYPERCHOLESTEROLEMIA: Chronic | ICD-10-CM

## 2020-06-01 DIAGNOSIS — D70.9 CHRONIC NEUTROPENIA (HCC): ICD-10-CM

## 2020-06-01 DIAGNOSIS — D69.6 THROMBOCYTOPENIA (HCC): ICD-10-CM

## 2020-06-01 PROCEDURE — 99214 OFFICE O/P EST MOD 30 MIN: CPT | Performed by: FAMILY MEDICINE

## 2020-06-01 ASSESSMENT — FIBROSIS 4 INDEX: FIB4 SCORE: 3.94

## 2020-06-01 NOTE — ASSESSMENT & PLAN NOTE
This is a chronic problem this patient has had ever since 2010.  She stays very stable in the four-point something range she is up to 4.2.  We will continue to monitor

## 2020-06-01 NOTE — ASSESSMENT & PLAN NOTE
This is a chronic health problem for this patient that she is done such a great job with lifestyle management that she no longer qualifies as hypercholesterolemia.  Total cholesterol is 199, triglycerides excellent at 63, HDL went up to 108 and her LDL is down to 78.  We will resolve this issue but continue to monitor.

## 2020-06-01 NOTE — ASSESSMENT & PLAN NOTE
This is a chronic health problem for this patient that did take a drop where her platelet counts went down to 96,000.  Previously they have been in the low 150s to low 140s.  She has not been on aspirin or taking regular doses of NSAIDs.  I am not certain why that happened.  We will continue to monitor and make sure to check again within 6 months.

## 2020-06-01 NOTE — PROGRESS NOTES
CC:Diagnoses of Pure hypercholesterolemia, Chronic neutropenia (HCC), Thrombocytopenia (HCC), Encounter for screening mammogram for breast cancer, and Current moderate episode of major depressive disorder without prior episode (HCC) were pertinent to this visit.    HISTORY OF PRESENT ILLNESS: Patient is a 75 y.o. female established patient who presents today to talk about her chronic health problems and the labs that she did prior to the office visit.      Pure hypercholesterolemia  This is a chronic health problem for this patient that she is done such a great job with lifestyle management that she no longer qualifies as hypercholesterolemia.  Total cholesterol is 199, triglycerides excellent at 63, HDL went up to 108 and her LDL is down to 78.  We will resolve this issue but continue to monitor.    Chronic neutropenia (CMS-HCC)  This is a chronic problem this patient has had ever since 2010.  She stays very stable in the four-point something range she is up to 4.2.  We will continue to monitor    Thrombocytopenia (HCC)  This is a chronic health problem for this patient that did take a drop where her platelet counts went down to 96,000.  Previously they have been in the low 150s to low 140s.  She has not been on aspirin or taking regular doses of NSAIDs.  I am not certain why that happened.  We will continue to monitor and make sure to check again within 6 months.    Current moderate episode of major depressive disorder without prior episode (HCC)  This is a chronic health problem for this patient that she finds that her mood is more stable and she tolerates stress better if she is on Cymbalta 20 mg daily.  When we had her on a higher dose it caused constipation but this dose is working well for controlling her symptoms as well as not causing side effects.  Absolutely denies suicidal or homicidal ideation.      Patient Active Problem List    Diagnosis Date Noted   • Degenerative disc disease, cervical 05/20/2019    • Current moderate episode of major depressive disorder without prior episode ([facility]) 2019   • Multinodular goiter (nontoxic) 2019   • Thrombocytopenia ([facility]) 10/18/2018   • Seasonal allergies 2014   • Chronic neutropenia ([facility]) 10/26/2010   • Postmenopausal atrophic vaginitis 2010   • Vitamin D deficiency disease 2010   • Diarrhea following gastrointestinal surgery 2010      Allergies:Other environmental    Current Outpatient Medications   Medication Sig Dispense Refill   • Cholestyramine Light 4 GM Powder Take 4 g by mouth every day. 3 Can 3   • DULoxetine (CYMBALTA) 20 MG Cap DR Particles Take 1 Cap by mouth every day. 90 Cap 3   • Cyanocobalamin (B-12) 1000 MCG SL Tab Place  under tongue.     • Multiple Vitamins-Minerals (CENTRUM SILVER ADULT 50+ PO) Take  by mouth.     • Flaxseed, Linseed, (FLAXSEED OIL) 1200 MG CAPS Take  by mouth.     • ascorbic acid (ASCORBIC ACID) 500 MG TABS Take 500 mg by mouth every day.     • vitamin D (CHOLECALCIFEROL) 1000 UNIT TABS Take 6,000 Units by mouth every day.     • CALCIUM 600 + D PO Take 1 Tab by mouth 2 Times a Day.       No current facility-administered medications for this visit.        Social History     Tobacco Use   • Smoking status: Former Smoker     Packs/day: 1.00     Years: 30.00     Pack years: 30.00     Types: Cigarettes     Last attempt to quit: 1992     Years since quittin.4   • Smokeless tobacco: Never Used   • Tobacco comment:    Substance Use Topics   • Alcohol use: Yes     Alcohol/week: 7.0 oz     Types: 14 Glasses of wine per week     Comment: Wine w/Dinner    • Drug use: No     Social History     Social History Narrative    , retired, spends half the year in [facility]       Family History   Problem Relation Age of Onset   • Cancer Mother    • Diabetes Father    • Heart Disease Father         Atrial Fib        ROS:     - Constitutional: negative for fever, chills, unexpected weight change, and  "fatigue/generalized weakness.    - HEENT:  Negative for headaches, vision changes, hearing changes, ear pain, ear discharge, rhinorrhea, sinus congestion, sore throat, and neck pain.      - Respiratory: Negative for cough, sputum production, chest congestion, dyspnea, wheezing, and crackles.      - Cardiovascular: Negative for chest pain, palpitations, orthopnea, and bilateral lower extremity edema.     - Gastrointestinal: Negative for heartburn, nausea, vomiting, abdominal pain, hematochezia, melena, diarrhea, constipation, and greasy/foul-smelling stools.     - Genitourinary: Negative for dysuria, polyuria, hematuria, pyuria, urinary urgency, and urinary incontinence.     - Musculoskeletal: Negative for myalgias, back pain, and joint pain.     - Skin: Negative for rash, itching, cyanotic skin color change.     - Neurological: Negative for dizziness, tingling, tremors, focal sensory deficit, focal weakness and headaches.     - Endo/Heme/Allergies: Does not bruise/bleed easily.     - Psychiatric/Behavioral: Negative for depression, suicidal/homicidal ideation and memory loss.          - NOTE: All other systems reviewed and are negative, except as in HPI.      Exam:    /62 (BP Location: Right arm, Patient Position: Sitting, BP Cuff Size: Adult)   Pulse 80   Temp 36.8 °C (98.3 °F) (Temporal)   Resp 20   Ht 1.651 m (5' 5\")   Wt 69.3 kg (152 lb 12.8 oz)   SpO2 96%  Body mass index is 25.43 kg/m².    General:  Well nourished, well developed female in NAD    LABS: 5/22/2020: Results reviewed and discussed with the patient, questions answered.    Please note that this dictation was created using voice recognition software. I have made every reasonable attempt to correct obvious errors, but I expect that there are errors of grammar and possibly content that I did not discover before finalizing the note.    Assessment/Plan:  1. Pure hypercholesterolemia  Controlled, continue with current meds and " lifestyle.  Patient has done an excellent job of getting this under control with lifestyle only.  Her medications are not related to hypercholesterolemia.  We will continue to monitor    2. Chronic neutropenia (HCC)  Stable, we will continue to monitor every 6 months    3. Thrombocytopenia (HCC)  Uncontrolled, her platelets dropped to 96,000.  We will continue to monitor    4. Encounter for screening mammogram for breast cancer  Patient had her mammogram done in [facility] and we will update her health maintenance topics    5. Current moderate episode of major depressive disorder without prior episode (HCC)  Controlled with current meds.  Plan to recheck in approximately 6 months

## 2020-06-01 NOTE — ASSESSMENT & PLAN NOTE
This is a chronic health problem for this patient that she finds that her mood is more stable and she tolerates stress better if she is on Cymbalta 20 mg daily.  When we had her on a higher dose it caused constipation but this dose is working well for controlling her symptoms as well as not causing side effects.  Absolutely denies suicidal or homicidal ideation.

## 2020-07-06 NOTE — TELEPHONE ENCOUNTER
COVID-19 Screening:    Does the patient OR patient’s household members have any of the following symptoms?  • Temperature: Fever >100.4°F or >38.0°C?  Yes, for patient at 100.4  • Respiratory symptoms: New or worsening cough, shortness of breath, or sore throat? Yes, for patient: new or worsening cough.  • GI symptoms: New onset of nausea, vomiting or diarrhea?  No  • Miscellaneous: New onset of loss of taste or smell, chills, repeated shaking with chills, muscle pain or headache? Yes, headaches  Has the patient or a household member tested positive for COVID-19 in the last 14 days?  No    Wife states they just got back from vacation. Pt has a fever of 100.4, headaches, congestion and a cough. Would like to know if pt needs to see PCP or go get tested. Please call, has [facility].   Spoke to Pt and she mentioned that she is doing wonderful.  Pt had a Radiofrequency neurotomy targeting the medial branches innervating the LEFT C2-3 and C3-4 facet joints with sedation  Special Equipment: Fluoroscopy with Dr. Ventura dated 08/05/19.    Thank you  Alivia

## 2020-08-10 ENCOUNTER — PATIENT MESSAGE (OUTPATIENT)
Dept: MEDICAL GROUP | Facility: PHYSICIAN GROUP | Age: 75
End: 2020-08-10

## 2020-08-12 ENCOUNTER — TELEPHONE (OUTPATIENT)
Dept: MEDICAL GROUP | Facility: PHYSICIAN GROUP | Age: 75
End: 2020-08-12

## 2020-08-12 NOTE — TELEPHONE ENCOUNTER
1. Caller Name: Yajaira Jose Dalton                        Call Back Number: [phone] (home)      2. Message: Please order up lab work for patient's upcoming appointment. Patient's  has MyCharted asking for lab work to be ordered up.     Thank you.

## 2020-08-13 DIAGNOSIS — E55.9 VITAMIN D DEFICIENCY: ICD-10-CM

## 2020-08-13 DIAGNOSIS — D70.9 CHRONIC NEUTROPENIA (HCC): ICD-10-CM

## 2020-08-13 DIAGNOSIS — D69.6 THROMBOCYTOPENIA (HCC): ICD-10-CM

## 2020-09-30 DIAGNOSIS — R73.9 HYPERGLYCEMIA: ICD-10-CM

## 2020-10-12 ENCOUNTER — HOSPITAL ENCOUNTER (OUTPATIENT)
Dept: LAB | Facility: MEDICAL CENTER | Age: 75
End: 2020-10-12
Attending: FAMILY MEDICINE
Payer: MEDICARE

## 2020-10-12 DIAGNOSIS — E55.9 VITAMIN D DEFICIENCY: ICD-10-CM

## 2020-10-12 DIAGNOSIS — D69.6 THROMBOCYTOPENIA (HCC): ICD-10-CM

## 2020-10-12 DIAGNOSIS — R73.9 HYPERGLYCEMIA: ICD-10-CM

## 2020-10-12 DIAGNOSIS — D70.9 CHRONIC NEUTROPENIA (HCC): ICD-10-CM

## 2020-10-12 LAB
25(OH)D3 SERPL-MCNC: 79 NG/ML (ref 30–100)
ALBUMIN SERPL BCP-MCNC: 4.4 G/DL (ref 3.2–4.9)
ALBUMIN/GLOB SERPL: 1.6 G/DL
ALP SERPL-CCNC: 87 U/L (ref 30–99)
ALT SERPL-CCNC: 19 U/L (ref 2–50)
ANION GAP SERPL CALC-SCNC: 6 MMOL/L (ref 7–16)
AST SERPL-CCNC: 22 U/L (ref 12–45)
BASOPHILS # BLD AUTO: 0.5 % (ref 0–1.8)
BASOPHILS # BLD: 0.02 K/UL (ref 0–0.12)
BILIRUB SERPL-MCNC: 0.6 MG/DL (ref 0.1–1.5)
BUN SERPL-MCNC: 20 MG/DL (ref 8–22)
CALCIUM SERPL-MCNC: 9.7 MG/DL (ref 8.5–10.5)
CHLORIDE SERPL-SCNC: 102 MMOL/L (ref 96–112)
CO2 SERPL-SCNC: 29 MMOL/L (ref 20–33)
CREAT SERPL-MCNC: 0.75 MG/DL (ref 0.5–1.4)
EOSINOPHIL # BLD AUTO: 0.09 K/UL (ref 0–0.51)
EOSINOPHIL NFR BLD: 2.1 % (ref 0–6.9)
ERYTHROCYTE [DISTWIDTH] IN BLOOD BY AUTOMATED COUNT: 51 FL (ref 35.9–50)
EST. AVERAGE GLUCOSE BLD GHB EST-MCNC: 94 MG/DL
GLOBULIN SER CALC-MCNC: 2.7 G/DL (ref 1.9–3.5)
GLUCOSE SERPL-MCNC: 101 MG/DL (ref 65–99)
HBA1C MFR BLD: 4.9 % (ref 0–5.6)
HCT VFR BLD AUTO: 41.5 % (ref 37–47)
HGB BLD-MCNC: 13.5 G/DL (ref 12–16)
IMM GRANULOCYTES # BLD AUTO: 0.01 K/UL (ref 0–0.11)
IMM GRANULOCYTES NFR BLD AUTO: 0.2 % (ref 0–0.9)
LYMPHOCYTES # BLD AUTO: 1.59 K/UL (ref 1–4.8)
LYMPHOCYTES NFR BLD: 36.8 % (ref 22–41)
MCH RBC QN AUTO: 32.3 PG (ref 27–33)
MCHC RBC AUTO-ENTMCNC: 32.5 G/DL (ref 33.6–35)
MCV RBC AUTO: 99.3 FL (ref 81.4–97.8)
MONOCYTES # BLD AUTO: 0.31 K/UL (ref 0–0.85)
MONOCYTES NFR BLD AUTO: 7.2 % (ref 0–13.4)
NEUTROPHILS # BLD AUTO: 2.3 K/UL (ref 2–7.15)
NEUTROPHILS NFR BLD: 53.2 % (ref 44–72)
NRBC # BLD AUTO: 0 K/UL
NRBC BLD-RTO: 0 /100 WBC
PLATELET # BLD AUTO: 107 K/UL (ref 164–446)
PMV BLD AUTO: 11.8 FL (ref 9–12.9)
POTASSIUM SERPL-SCNC: 4.4 MMOL/L (ref 3.6–5.5)
PROT SERPL-MCNC: 7.1 G/DL (ref 6–8.2)
RBC # BLD AUTO: 4.18 M/UL (ref 4.2–5.4)
SODIUM SERPL-SCNC: 137 MMOL/L (ref 135–145)
VIT B12 SERPL-MCNC: 1174 PG/ML (ref 211–911)
WBC # BLD AUTO: 4.3 K/UL (ref 4.8–10.8)

## 2020-10-12 PROCEDURE — 85025 COMPLETE CBC W/AUTO DIFF WBC: CPT

## 2020-10-12 PROCEDURE — 36415 COLL VENOUS BLD VENIPUNCTURE: CPT | Mod: GA

## 2020-10-12 PROCEDURE — 82607 VITAMIN B-12: CPT

## 2020-10-12 PROCEDURE — 82306 VITAMIN D 25 HYDROXY: CPT

## 2020-10-12 PROCEDURE — 83036 HEMOGLOBIN GLYCOSYLATED A1C: CPT | Mod: GA

## 2020-10-12 PROCEDURE — 80053 COMPREHEN METABOLIC PANEL: CPT

## 2020-10-14 ENCOUNTER — OFFICE VISIT (OUTPATIENT)
Dept: PHYSICAL MEDICINE AND REHAB | Facility: MEDICAL CENTER | Age: 75
End: 2020-10-14
Payer: MEDICARE

## 2020-10-14 VITALS
SYSTOLIC BLOOD PRESSURE: 132 MMHG | OXYGEN SATURATION: 98 % | DIASTOLIC BLOOD PRESSURE: 76 MMHG | WEIGHT: 153.44 LBS | TEMPERATURE: 97.6 F | HEART RATE: 87 BPM | BODY MASS INDEX: 25.56 KG/M2 | HEIGHT: 65 IN

## 2020-10-14 DIAGNOSIS — M48.02 CERVICAL SPINAL STENOSIS: ICD-10-CM

## 2020-10-14 DIAGNOSIS — M47.812 SPONDYLOSIS OF CERVICAL REGION WITHOUT MYELOPATHY OR RADICULOPATHY: ICD-10-CM

## 2020-10-14 DIAGNOSIS — G44.86 CERVICOGENIC HEADACHE: ICD-10-CM

## 2020-10-14 PROCEDURE — 99213 OFFICE O/P EST LOW 20 MIN: CPT | Performed by: PHYSICAL MEDICINE & REHABILITATION

## 2020-10-14 RX ORDER — HYDROCORTISONE ACETATE 0.5 %
CREAM (GRAM) TOPICAL
COMMUNITY
End: 2022-10-24

## 2020-10-14 ASSESSMENT — PATIENT HEALTH QUESTIONNAIRE - PHQ9
5. POOR APPETITE OR OVEREATING: 0 - NOT AT ALL
SUM OF ALL RESPONSES TO PHQ QUESTIONS 1-9: 3
CLINICAL INTERPRETATION OF PHQ2 SCORE: 2

## 2020-10-14 ASSESSMENT — PAIN SCALES - GENERAL: PAINLEVEL: NO PAIN

## 2020-10-14 ASSESSMENT — FIBROSIS 4 INDEX: FIB4 SCORE: 3.44

## 2020-10-14 NOTE — PROGRESS NOTES
Follow up patient note  Interventional spine and sports physiatry, Physical medicine rehabilitation      Chief complaint:   Chief Complaint   Patient presents with   • Follow-Up     Neck pain         HISTORY    Please see new patient note dated  by Dr Ventura,  for more details.     HPI  Patient identification: Yajaira Eduardo  1945,   female  With Diagnoses of Cervical spinal stenosis, Cervicogenic headache, and Spondylosis of cervical region without myelopathy or radiculopathy were pertinent to this visit.   Diagnostic medial branch block targeting the left C2-3 and C3-4 facet joints done on 2019 and 2019 with 100% pain relief post procedure.  2019 radiofrequency neurotomy targeting the left C2-3 and C3-4 facet joints with 100% pain relief for the post procedural visit.    The patient continues to have outstanding pain relief with 100% pain relief in her neck and headaches after the radiofrequency neurotomy above.  She does get intermittent popping sensation and minimal pain but this is 1 out of 10 in intensity when it is present.  No functional deficits.  She is quite happy with the relief.           ROS Red Flags :   Fever, Chills, Sweats: Denies  Involuntary Weight Loss: Denies  Bowel/Bladder Incontinence: Denies  Saddle Anesthesia: Denies        PMHx:   Past Medical History:   Diagnosis Date   • Abnormal mammogram, unspecified     09 Diagnostic mammo L breast:  Negative (next 09)   • Cervicalgia     controlled   • Current moderate episode of major depressive disorder without prior episode (HCC) 2019   • Cystocele, midline     uncontrolled   • Degenerative disc disease, cervical 2019   • Diarrhea following gastrointestinal surgery 2010   • Fracture closed, mandible     after trauma   • Glucose intolerance (impaired glucose tolerance) 10/23/2017   • Lactose intolerance    • Leukopenia 10/26/2010   • Multinodular goiter (nontoxic) 2019   • OA (osteoarthritis)  4/20/2010   • Postmenopausal atrophic vaginitis 4/26/2010   • Pure hypercholesterolemia 4/26/2010   • S/P colonoscopy     1996: Normal   • Seasonal and perennial allergic rhinoconjunctivitis     hayfever, no rx   • Thoracic or lumbosacral neuritis or radiculitis, unspecified     controlled   • Unspecified vitamin D deficiency 4/26/2010   • Urinary bladder disorder        PSHx:   Past Surgical History:   Procedure Laterality Date   • COLPOSACROPEXY ROBOTIC  8/22/2013    Performed by Elisa Jones M.D. at SURGERY [facility]   • CYSTOSCOPY  8/22/2013    Performed by Elisa Jones M.D. at SURGERY [facility]   • BLADDER SLING FEMALE     • BREAST BIOPSY      hx of left breast biopsy-benign   • PHU BY LAPAROSCOPY     • HYSTERECTOMY, VAGINAL      without BSO   • TONSILLECTOMY     • US-NEEDLE CORE BX-BREAST PANEL         Family history   Family History   Problem Relation Age of Onset   • Cancer Mother    • Diabetes Father    • Heart Disease Father         Atrial Fib         Medications:   Outpatient Medications Marked as Taking for the 10/14/20 encounter (Office Visit) with Benson Ventura M.D.   Medication Sig Dispense Refill   • Cholestyramine Light 4 GM Powder Take 4 g by mouth every day. 3 Can 3   • DULoxetine (CYMBALTA) 20 MG Cap DR Particles Take 1 Cap by mouth every day. 90 Cap 3   • Cyanocobalamin (B-12) 1000 MCG SL Tab Place  under tongue.     • Multiple Vitamins-Minerals (CENTRUM SILVER ADULT 50+ PO) Take  by mouth.     • Flaxseed, Linseed, (FLAXSEED OIL) 1200 MG CAPS Take  by mouth.     • ascorbic acid (ASCORBIC ACID) 500 MG TABS Take 500 mg by mouth every day.     • vitamin D (CHOLECALCIFEROL) 1000 UNIT TABS Take 6,000 Units by mouth every day.     • CALCIUM 600 + D PO Take 1 Tab by mouth 2 Times a Day.          Current Outpatient Medications on File Prior to Visit   Medication Sig Dispense Refill   • Cholestyramine Light 4 GM Powder Take 4 g by mouth every day. 3 Can 3   • DULoxetine  (CYMBALTA) 20 MG Cap DR Particles Take 1 Cap by mouth every day. 90 Cap 3   • Cyanocobalamin (B-12) 1000 MCG SL Tab Place  under tongue.     • Multiple Vitamins-Minerals (CENTRUM SILVER ADULT 50+ PO) Take  by mouth.     • Flaxseed, Linseed, (FLAXSEED OIL) 1200 MG CAPS Take  by mouth.     • ascorbic acid (ASCORBIC ACID) 500 MG TABS Take 500 mg by mouth every day.     • vitamin D (CHOLECALCIFEROL) 1000 UNIT TABS Take 6,000 Units by mouth every day.     • CALCIUM 600 + D PO Take 1 Tab by mouth 2 Times a Day.       No current facility-administered medications on file prior to visit.          Allergies:   Allergies   Allergen Reactions   • Other Environmental Runny Nose     hayfever-runny nose       Social Hx:   Social History     Socioeconomic History   • Marital status:      Spouse name: Not on file   • Number of children: Not on file   • Years of education: Not on file   • Highest education level: Not on file   Occupational History   • Not on file   Social Needs   • Financial resource strain: Not on file   • Food insecurity     Worry: Not on file     Inability: Not on file   • Transportation needs     Medical: Not on file     Non-medical: Not on file   Tobacco Use   • Smoking status: Former Smoker     Packs/day: 1.00     Years: 30.00     Pack years: 30.00     Types: Cigarettes     Quit date: 1992     Years since quittin.8   • Smokeless tobacco: Never Used   • Tobacco comment:    Substance and Sexual Activity   • Alcohol use: Yes     Alcohol/week: 7.0 oz     Types: 14 Glasses of wine per week     Comment: Wine w/Dinner    • Drug use: No   • Sexual activity: Yes     Partners: Male     Birth control/protection: Post-Menopausal     Comment: , retired   Lifestyle   • Physical activity     Days per week: Not on file     Minutes per session: Not on file   • Stress: Not on file   Relationships   • Social connections     Talks on phone: Not on file     Gets together: Not on file     Attends [employer]  "service: Not on file     Active member of club or organization: Not on file     Attends meetings of clubs or organizations: Not on file     Relationship status: Not on file   • Intimate partner violence     Fear of current or ex partner: Not on file     Emotionally abused: Not on file     Physically abused: Not on file     Forced sexual activity: Not on file   Other Topics Concern   •  Service No   • Blood Transfusions No   • Caffeine Concern Yes   • Occupational Exposure No   • Hobby Hazards No   • Sleep Concern No   • Stress Concern No   • Weight Concern No   • Special Diet No   • Back Care No   • Exercise Yes     Comment: golfs 4 times per week   • Bike Helmet No   • Seat Belt Yes   • Self-Exams No   Social History Narrative    , retired, spends half the year in [facility]         EXAMINATION     Physical Exam:   Vitals: /76 (BP Location: Left arm, Patient Position: Sitting, BP Cuff Size: Adult)   Pulse 87   Temp 36.4 °C (97.6 °F) (Temporal)   Ht 1.651 m (5' 5\")   Wt 69.6 kg (153 lb 7 oz)   SpO2 98%     Constitutional:   Body Habitus: Body mass index is 25.53 kg/m².  Cooperation: Fully cooperates with exam  Appearance: Well-groomed no disheveled    Respiratory-  breathing comfortable on room air, no audible wheezing  Cardiovascular- capillary refills less than 2 seconds. No lower extremity edema is noted.   Psychiatric- alert and oriented ×3. Normal affect.    MSK/NEURO:    There are no signs of infection around the injection sites.  There is near full range of motion the cervical spine.  No areas of tenderness palpation.  Spurling's maneuver is negative bilaterally        Key points for the international standards for neurological classification of spinal cord injury (ISNCSCI) to light touch.     Dermatome R L   C4 2 2   C5 2 2   C6 2 2   C7 2 2   C8 2 2   T1 2 2   T2 2 2                                         Motor Exam Upper Extremities   ? Myotome R L   Shoulder flexion C5 5 5 "   Elbow flexion C5 5 5   Wrist extension C6 5 5   Elbow extension C7 5 5   Finger flexion C8 5 5   Finger abduction T1 5 5                   MEDICAL DECISION MAKING    DATA    Labs:   Lab Results   Component Value Date/Time    SODIUM 137 10/12/2020 07:25 AM    POTASSIUM 4.4 10/12/2020 07:25 AM    CHLORIDE 102 10/12/2020 07:25 AM    CO2 29 10/12/2020 07:25 AM    GLUCOSE 101 (H) 10/12/2020 07:25 AM    BUN 20 10/12/2020 07:25 AM    CREATININE 0.75 10/12/2020 07:25 AM    CREATININE 0.9 10/23/2007 07:36 AM        Lab Results   Component Value Date/Time    PROTHROMBTM 12.1 2008 02:48 PM    INR 1.04 2008 02:48 PM        Lab Results   Component Value Date/Time    WBC 4.3 (L) 10/12/2020 07:25 AM    WBC 4.4 2011 07:20 AM    WBC 4.4 2011 07:20 AM    RBC 4.18 (L) 10/12/2020 07:25 AM    RBC 4.07 2011 07:20 AM    RBC 4.07 2011 07:20 AM    HEMOGLOBIN 13.5 10/12/2020 07:25 AM    HEMATOCRIT 41.5 10/12/2020 07:25 AM    MCV 99.3 (H) 10/12/2020 07:25 AM    MCV 94 2011 07:20 AM    MCV 94 2011 07:20 AM    MCH 32.3 10/12/2020 07:25 AM    MCH 31.7 2011 07:20 AM    MCH 31.7 2011 07:20 AM    MCHC 32.5 (L) 10/12/2020 07:25 AM    MPV 11.8 10/12/2020 07:25 AM    NEUTSPOLYS 53.20 10/12/2020 07:25 AM    LYMPHOCYTES 36.80 10/12/2020 07:25 AM    MONOCYTES 7.20 10/12/2020 07:25 AM    EOSINOPHILS 2.10 10/12/2020 07:25 AM    BASOPHILS 0.50 10/12/2020 07:25 AM        Lab Results   Component Value Date/Time    HBA1C 4.9 10/12/2020 07:25 AM          DIAGNOSIS   Visit Diagnoses     ICD-10-CM   1. Cervical spinal stenosis  M48.02   2. Cervicogenic headache  R51.9   3. Spondylosis of cervical region without myelopathy or radiculopathy  M47.812         ASSESSMENT and PLAN:     Yajaira Eduardo  1945,   female      Yajaira was seen today for follow-up.    Diagnoses and all orders for this visit:    Cervical spinal stenosis    Cervicogenic headache    Spondylosis of cervical region without  myelopathy or radiculopathy        Continue conservative treatment and home exercise program.  We discussed the importance of these.  She is still having excellent pain relief after the radiofrequency neurotomy and no longer has severe pain from her headaches.    Follow up: 1 year PRN    Thank you for allowing me to participate in the care of this patient. If you have any questions please not hesitate to contact me.        Please note that this dictation was created using voice recognition software. I have made every reasonable attempt to correct obvious errors but there may be errors of grammar and content that I may have overlooked prior to finalization of this note.      Benson Ventura MD  Interventional Spine and Sports Physiatry  Physical Medicine and Rehabilitation  [facility]

## 2020-10-15 ENCOUNTER — OFFICE VISIT (OUTPATIENT)
Dept: MEDICAL GROUP | Facility: PHYSICIAN GROUP | Age: 75
End: 2020-10-15
Payer: MEDICARE

## 2020-10-15 VITALS
HEIGHT: 65 IN | RESPIRATION RATE: 14 BRPM | DIASTOLIC BLOOD PRESSURE: 92 MMHG | HEART RATE: 89 BPM | TEMPERATURE: 98.2 F | OXYGEN SATURATION: 98 % | BODY MASS INDEX: 25.06 KG/M2 | SYSTOLIC BLOOD PRESSURE: 140 MMHG | WEIGHT: 150.4 LBS

## 2020-10-15 DIAGNOSIS — D69.6 THROMBOCYTOPENIA (HCC): ICD-10-CM

## 2020-10-15 DIAGNOSIS — F32.1 CURRENT MODERATE EPISODE OF MAJOR DEPRESSIVE DISORDER WITHOUT PRIOR EPISODE (HCC): ICD-10-CM

## 2020-10-15 DIAGNOSIS — Z23 NEED FOR VACCINATION: ICD-10-CM

## 2020-10-15 DIAGNOSIS — D70.9 CHRONIC NEUTROPENIA (HCC): ICD-10-CM

## 2020-10-15 DIAGNOSIS — E55.9 VITAMIN D DEFICIENCY: ICD-10-CM

## 2020-10-15 PROCEDURE — 90662 IIV NO PRSV INCREASED AG IM: CPT | Performed by: FAMILY MEDICINE

## 2020-10-15 PROCEDURE — G0008 ADMIN INFLUENZA VIRUS VAC: HCPCS | Performed by: FAMILY MEDICINE

## 2020-10-15 PROCEDURE — 99214 OFFICE O/P EST MOD 30 MIN: CPT | Mod: 25 | Performed by: FAMILY MEDICINE

## 2020-10-15 ASSESSMENT — FIBROSIS 4 INDEX: FIB4 SCORE: 3.44

## 2020-10-15 NOTE — ASSESSMENT & PLAN NOTE
This is a chronic health problem for this patient but she is always been able to keep her platelets above 100.  Her most recent labs show her platelets are 107.  She does not have any unusual bleeding problems or unusual bruising.  We will continue to keep an eye on this every 6 months

## 2020-10-15 NOTE — ASSESSMENT & PLAN NOTE
This is a chronic health problem that is well controlled with current medications and lifestyle measures.  Mood is doing well, she continues on duloxetine 20 mg daily.  She has had the added benefit that that helps with the diarrhea she has been having after gallbladder surgery so she is no longer having to take the cholestyramine but she does keep it on hand if needed.

## 2020-10-15 NOTE — ASSESSMENT & PLAN NOTE
This is a chronic problem for this patient that continues where she has a mildly low white count.  It is 4.3 this time with her absolute neutrophils and absolute lymphocytes being in the normal range.  We will continue to monitor.

## 2020-10-15 NOTE — ASSESSMENT & PLAN NOTE
This is a chronic health problem that is well controlled with current medications and lifestyle measures.  Her vitamin D level is the upper limits of normal at 79.  She is only on 2000 units daily.  I am going to have her switch to 2000 units 3 times per week and we will plan to recheck this in the spring when they return from [place].

## 2020-11-02 RX ORDER — DULOXETIN HYDROCHLORIDE 20 MG/1
CAPSULE, DELAYED RELEASE ORAL
Qty: 90 CAP | Refills: 0 | Status: SHIPPED | OUTPATIENT
Start: 2020-11-02 | End: 2021-09-20

## 2020-12-09 ENCOUNTER — OFFICE VISIT (OUTPATIENT)
Dept: URBAN - METROPOLITAN AREA CLINIC 48 | Facility: CLINIC | Age: 75
End: 2020-12-09
Payer: MEDICARE

## 2020-12-09 PROCEDURE — 99213 OFFICE O/P EST LOW 20 MIN: CPT | Performed by: OPHTHALMOLOGY

## 2020-12-09 ASSESSMENT — INTRAOCULAR PRESSURE
OS: 16
OD: 18

## 2021-01-08 ENCOUNTER — OFFICE VISIT (OUTPATIENT)
Dept: URBAN - METROPOLITAN AREA CLINIC 48 | Facility: CLINIC | Age: 76
End: 2021-01-08
Payer: MEDICARE

## 2021-01-08 DIAGNOSIS — H35.3231 EXUDATIVE AGE-REL MCLR DEGN, BI, WITH ACTV CHRDL NEOVAS: Primary | ICD-10-CM

## 2021-01-08 PROCEDURE — 99213 OFFICE O/P EST LOW 20 MIN: CPT | Performed by: OPHTHALMOLOGY

## 2021-01-08 PROCEDURE — 92134 CPTRZ OPH DX IMG PST SGM RTA: CPT | Performed by: OPHTHALMOLOGY

## 2021-01-08 ASSESSMENT — INTRAOCULAR PRESSURE
OD: 13
OS: 14

## 2021-01-08 NOTE — IMPRESSION/PLAN
Impression: Exudative age-rel mclr degn, bi, with actv chrdl neovas: H35.3231. Bilateral. Plan: OCT ordered and performed today. Discussed diagnosis in detail with patient. Discussed treatment options with patient. Discussed risks and benefits and patient understands. Recommend a one time intravitreal injection of Lucentis in both eyes today. Patient understands and elects to proceed.

## 2021-02-05 ENCOUNTER — OFFICE VISIT (OUTPATIENT)
Dept: URBAN - METROPOLITAN AREA CLINIC 48 | Facility: CLINIC | Age: 76
End: 2021-02-05
Payer: MEDICARE

## 2021-02-05 DIAGNOSIS — H35.3211 EXDTVE AGE-REL MCLR DEGN, RIGHT EYE, WITH ACTV CHRDL NEOVAS: Primary | ICD-10-CM

## 2021-02-05 DIAGNOSIS — H35.3222 EXDTVE AGE-REL MCLR DEGN, LEFT EYE, WITH INACT CHRDL NEOVAS: ICD-10-CM

## 2021-02-05 PROCEDURE — 67028 INJECTION EYE DRUG: CPT | Performed by: OPHTHALMOLOGY

## 2021-02-05 PROCEDURE — 99213 OFFICE O/P EST LOW 20 MIN: CPT | Performed by: OPHTHALMOLOGY

## 2021-02-05 PROCEDURE — 92134 CPTRZ OPH DX IMG PST SGM RTA: CPT | Performed by: OPHTHALMOLOGY

## 2021-02-05 ASSESSMENT — INTRAOCULAR PRESSURE
OS: 15
OD: 14

## 2021-02-05 NOTE — IMPRESSION/PLAN
Impression: Exdtve age-rel mclr degn, right eye, with actv chrdl neovas: H35.3211. Plan: OCT ordered and performed today. Discussed diagnosis in detail with patient. Discussed treatment options with patient. Discussed risks and benefits and patient understands. Recommend a intravitreal injection of Lucentis in the right eye today and 1 EVERY MONTH  #1 today then #2 in 1 month, #3 in 2 month's, Then perform DE/OCT in 3 month's.

## 2021-03-05 ENCOUNTER — PROCEDURE (OUTPATIENT)
Dept: URBAN - METROPOLITAN AREA CLINIC 48 | Facility: CLINIC | Age: 76
End: 2021-03-05
Payer: MEDICARE

## 2021-03-05 PROCEDURE — 67028 INJECTION EYE DRUG: CPT | Performed by: OPHTHALMOLOGY

## 2021-04-02 ENCOUNTER — PROCEDURE (OUTPATIENT)
Dept: URBAN - METROPOLITAN AREA CLINIC 48 | Facility: CLINIC | Age: 76
End: 2021-04-02
Payer: MEDICARE

## 2021-04-02 PROCEDURE — 67028 INJECTION EYE DRUG: CPT | Performed by: OPHTHALMOLOGY

## 2021-07-08 ENCOUNTER — GYNECOLOGY VISIT (OUTPATIENT)
Dept: OBGYN | Facility: CLINIC | Age: 76
End: 2021-07-08
Payer: MEDICARE

## 2021-07-08 VITALS — BODY MASS INDEX: 26.46 KG/M2 | WEIGHT: 159 LBS | SYSTOLIC BLOOD PRESSURE: 173 MMHG | DIASTOLIC BLOOD PRESSURE: 84 MMHG

## 2021-07-08 DIAGNOSIS — N95.2 VAGINAL ATROPHY: ICD-10-CM

## 2021-07-08 DIAGNOSIS — N81.4 CYSTOCELE WITH PROLAPSE: ICD-10-CM

## 2021-07-08 PROCEDURE — 99213 OFFICE O/P EST LOW 20 MIN: CPT | Performed by: OBSTETRICS & GYNECOLOGY

## 2021-07-08 ASSESSMENT — FIBROSIS 4 INDEX: FIB4 SCORE: 3.49

## 2021-07-08 NOTE — NON-PROVIDER
Pt here to discuss prolapse.   Pt states had surgery for prolapse back in 2013, but states she can feel the prolapse coming back and can feel the mesh   Good # [phone]  Pharmacy verified.

## 2021-07-08 NOTE — PROGRESS NOTES
C/c: prolapse    History of present illness: 76 y.o. presents with feeling a bulge. She had prolapse surgery in  with a now retired gynecologist. States recently she has felt the same pressure and bulge as before. Is able to reduce it in the shower.     Review of systems:  Pertinent positives documented in HPI and all other systems reviewed & are negative    PGYNHx:   Hx of hyst for VB in her 30s  Hx of l-scope sacrocolpopexy  for prolapse.      POBHx:   OB History    Para Term  AB Living   2 2 2     2   SAB TAB Ectopic Molar Multiple Live Births             2      # Outcome Date GA Lbr Hayden/2nd Weight Sex Delivery Anes PTL Lv   2 Term      NORMAL SPONT      1 Term      NORMAL SPONT          All PMH, PSH, allergies, social history and FH reviewed and updated today:  Past Medical History:   Diagnosis Date   • Abnormal mammogram, unspecified     09 Diagnostic mammo L breast:  Negative (next 09)   • Cervicalgia     controlled   • Current moderate episode of major depressive disorder without prior episode (HCC) 2019   • Cystocele, midline     uncontrolled   • Degenerative disc disease, cervical 2019   • Diarrhea following gastrointestinal surgery 2010   • Fracture closed, mandible     after trauma   • Glucose intolerance (impaired glucose tolerance) 10/23/2017   • Lactose intolerance    • Leukopenia 10/26/2010   • Multinodular goiter (nontoxic) 2019   • OA (osteoarthritis) 2010   • Postmenopausal atrophic vaginitis 2010   • Pure hypercholesterolemia 2010   • S/P colonoscopy     1996: Normal   • Seasonal and perennial allergic rhinoconjunctivitis     hayfever, no rx   • Thoracic or lumbosacral neuritis or radiculitis, unspecified     controlled   • Unspecified vitamin D deficiency 2010   • Urinary bladder disorder        Past Surgical History:   Procedure Laterality Date   • COLPOSACROPEXY ROBOTIC  2013    Performed by Elisa Jones M.D.  at SURGERY [facility]   • CYSTOSCOPY  2013    Performed by Elisa Jones M.D. at SURGERY [facility]   • BLADDER SLING FEMALE     • BREAST BIOPSY      hx of left breast biopsy-benign   • PHU BY LAPAROSCOPY     • HYSTERECTOMY, VAGINAL      without BSO   • TONSILLECTOMY     • US-NEEDLE CORE BX-BREAST PANEL         Allergies:   Allergies   Allergen Reactions   • Other Environmental Runny Nose     hayfever-runny nose       Social History     Socioeconomic History   • Marital status:      Spouse name: Not on file   • Number of children: Not on file   • Years of education: Not on file   • Highest education level: Not on file   Occupational History   • Not on file   Tobacco Use   • Smoking status: Former Smoker     Packs/day: 1.00     Years: 30.00     Pack years: 30.00     Types: Cigarettes     Quit date: 1992     Years since quittin.5   • Smokeless tobacco: Never Used   • Tobacco comment:    Vaping Use   • Vaping Use: Never used   Substance and Sexual Activity   • Alcohol use: Yes     Alcohol/week: 7.0 oz     Types: 14 Glasses of wine per week     Comment: Wine w/Dinner    • Drug use: No   • Sexual activity: Yes     Partners: Male     Birth control/protection: Post-Menopausal     Comment: , retired   Other Topics Concern   •  Service No   • Blood Transfusions No   • Caffeine Concern Yes   • Occupational Exposure No   • Hobby Hazards No   • Sleep Concern No   • Stress Concern No   • Weight Concern No   • Special Diet No   • Back Care No   • Exercise Yes     Comment: golfs 4 times per week   • Bike Helmet No   • Seat Belt Yes   • Self-Exams No   Social History Narrative    , retired, spends half the year in [facility]     Social Determinants of Health     Financial Resource Strain:    • Difficulty of Paying Living Expenses:    Food Insecurity:    • Worried About Running Out of Food in the Last Year:    • Ran Out of Food in the Last Year:    Transportation  Needs:    • Lack of Transportation (Medical):    • Lack of Transportation (Non-Medical):    Physical Activity:    • Days of Exercise per Week:    • Minutes of Exercise per Session:    Stress:    • Feeling of Stress :    Social Connections:    • Frequency of Communication with Friends and Family:    • Frequency of Social Gatherings with Friends and Family:    • Attends Zoroastrianism Services:    • Active Member of Clubs or Organizations:    • Attends Club or Organization Meetings:    • Marital Status:    Intimate Partner Violence:    • Fear of Current or Ex-Partner:    • Emotionally Abused:    • Physically Abused:    • Sexually Abused:        Family History   Problem Relation Age of Onset   • Cancer Mother    • Diabetes Father    • Heart Disease Father         Atrial Fib       Physical exam:  BP (!) 173/84   Wt 72.1 kg (159 lb)     General:appears stated age, is in no apparent distress  Head: normocephalic, non-tender  Neck: neck is supple, no jugular venous distension  CV : regular rate and rhythm, no peripheral edema  Lungs: Normal respiratory effort. Clear to auscultation bilaterally  Abdomen: Bowel sounds positive, nondistended, soft, nontender x4, no rebound or guarding. No organomegaly. No masses.  Female GYN:   POP-Q    Aa: -2  Ba: -1 C: -6   Gh: 4 PB: 1 TVL: 9   Ap: -3 Bp: -2 D: na     Stage: 2    Normal PB (3.1-4.1cm)  - Normal external genitalia, vaginal epithelium, perineal body, and cervix  - Dovetail sign positive  - Cough test: neg  - Fecal or flatal incontinence: none      Skin: No rashes, or ulcers or lesions seen  Psychiatric: Patient shows appropriate affect, is alert and oriented x3, intact judgment and insight.    Assessment  1. Cystocele with prolapse     2. Vaginal atrophy         Plan  - 76 y.o.  here with prolapse  - Discussed various methods of managing pelvic organ prolapse including pelvic floor physical therapy, Impressa temporary vaginal support device, pessary, and surgery  - She  will by the Impressa online and try this for now. Pt is leaving to summer in [place] until Sept. She will message me in [employer] after she returns for PT.     - Follow up fall 2021.

## 2021-08-04 ENCOUNTER — TELEPHONE (OUTPATIENT)
Dept: INTERNAL MEDICINE | Facility: IMAGING CENTER | Age: 76
End: 2021-08-04

## 2021-09-20 DIAGNOSIS — D69.6 THROMBOCYTOPENIA (HCC): ICD-10-CM

## 2021-09-20 DIAGNOSIS — D70.9 CHRONIC NEUTROPENIA (HCC): ICD-10-CM

## 2021-09-20 DIAGNOSIS — E55.9 VITAMIN D DEFICIENCY: ICD-10-CM

## 2021-09-20 PROBLEM — H81.10 BPPV (BENIGN PAROXYSMAL POSITIONAL VERTIGO): Status: ACTIVE | Noted: 2017-08-13

## 2021-09-20 PROBLEM — M25.521 RIGHT ELBOW PAIN: Status: ACTIVE | Noted: 2018-07-16

## 2021-09-20 RX ORDER — DULOXETIN HYDROCHLORIDE 30 MG/1
30 CAPSULE, DELAYED RELEASE ORAL DAILY
COMMUNITY
Start: 2021-07-23 | End: 2022-11-01 | Stop reason: SDUPTHER

## 2021-10-05 ENCOUNTER — PATIENT MESSAGE (OUTPATIENT)
Dept: INTERNAL MEDICINE | Facility: IMAGING CENTER | Age: 76
End: 2021-10-05

## 2021-10-13 ENCOUNTER — HOSPITAL ENCOUNTER (OUTPATIENT)
Dept: LAB | Facility: MEDICAL CENTER | Age: 76
End: 2021-10-13
Attending: FAMILY MEDICINE
Payer: MEDICARE

## 2021-10-13 ENCOUNTER — OFFICE VISIT (OUTPATIENT)
Dept: PHYSICAL MEDICINE AND REHAB | Facility: MEDICAL CENTER | Age: 76
End: 2021-10-13
Payer: MEDICARE

## 2021-10-13 VITALS
WEIGHT: 162.04 LBS | BODY MASS INDEX: 27 KG/M2 | SYSTOLIC BLOOD PRESSURE: 118 MMHG | HEART RATE: 81 BPM | DIASTOLIC BLOOD PRESSURE: 78 MMHG | HEIGHT: 65 IN | TEMPERATURE: 97.6 F | OXYGEN SATURATION: 97 %

## 2021-10-13 DIAGNOSIS — G44.86 CERVICOGENIC HEADACHE: ICD-10-CM

## 2021-10-13 DIAGNOSIS — D70.9 CHRONIC NEUTROPENIA (HCC): ICD-10-CM

## 2021-10-13 DIAGNOSIS — D69.6 THROMBOCYTOPENIA (HCC): ICD-10-CM

## 2021-10-13 DIAGNOSIS — E55.9 VITAMIN D DEFICIENCY: ICD-10-CM

## 2021-10-13 DIAGNOSIS — M48.02 CERVICAL SPINAL STENOSIS: ICD-10-CM

## 2021-10-13 DIAGNOSIS — M47.812 SPONDYLOSIS OF CERVICAL REGION WITHOUT MYELOPATHY OR RADICULOPATHY: ICD-10-CM

## 2021-10-13 LAB
25(OH)D3 SERPL-MCNC: 64 NG/ML (ref 30–100)
ALBUMIN SERPL BCP-MCNC: 4.5 G/DL (ref 3.2–4.9)
ALBUMIN/GLOB SERPL: 1.8 G/DL
ALP SERPL-CCNC: 85 U/L (ref 30–99)
ALT SERPL-CCNC: 15 U/L (ref 2–50)
ANION GAP SERPL CALC-SCNC: 11 MMOL/L (ref 7–16)
AST SERPL-CCNC: 22 U/L (ref 12–45)
BASOPHILS # BLD AUTO: 0.6 % (ref 0–1.8)
BASOPHILS # BLD: 0.03 K/UL (ref 0–0.12)
BILIRUB SERPL-MCNC: 0.6 MG/DL (ref 0.1–1.5)
BUN SERPL-MCNC: 17 MG/DL (ref 8–22)
CALCIUM SERPL-MCNC: 9.4 MG/DL (ref 8.5–10.5)
CHLORIDE SERPL-SCNC: 104 MMOL/L (ref 96–112)
CO2 SERPL-SCNC: 26 MMOL/L (ref 20–33)
CREAT SERPL-MCNC: 0.73 MG/DL (ref 0.5–1.4)
EOSINOPHIL # BLD AUTO: 0.08 K/UL (ref 0–0.51)
EOSINOPHIL NFR BLD: 1.5 % (ref 0–6.9)
ERYTHROCYTE [DISTWIDTH] IN BLOOD BY AUTOMATED COUNT: 49.8 FL (ref 35.9–50)
GLOBULIN SER CALC-MCNC: 2.5 G/DL (ref 1.9–3.5)
GLUCOSE SERPL-MCNC: 103 MG/DL (ref 65–99)
HCT VFR BLD AUTO: 39 % (ref 37–47)
HGB BLD-MCNC: 12.8 G/DL (ref 12–16)
IMM GRANULOCYTES # BLD AUTO: 0.02 K/UL (ref 0–0.11)
IMM GRANULOCYTES NFR BLD AUTO: 0.4 % (ref 0–0.9)
LYMPHOCYTES # BLD AUTO: 1.6 K/UL (ref 1–4.8)
LYMPHOCYTES NFR BLD: 29.7 % (ref 22–41)
MCH RBC QN AUTO: 32.2 PG (ref 27–33)
MCHC RBC AUTO-ENTMCNC: 32.8 G/DL (ref 33.6–35)
MCV RBC AUTO: 98 FL (ref 81.4–97.8)
MONOCYTES # BLD AUTO: 0.35 K/UL (ref 0–0.85)
MONOCYTES NFR BLD AUTO: 6.5 % (ref 0–13.4)
NEUTROPHILS # BLD AUTO: 3.3 K/UL (ref 2–7.15)
NEUTROPHILS NFR BLD: 61.3 % (ref 44–72)
NRBC # BLD AUTO: 0 K/UL
NRBC BLD-RTO: 0 /100 WBC
PLATELET # BLD AUTO: 137 K/UL (ref 164–446)
PMV BLD AUTO: 11.9 FL (ref 9–12.9)
POTASSIUM SERPL-SCNC: 4.3 MMOL/L (ref 3.6–5.5)
PROT SERPL-MCNC: 7 G/DL (ref 6–8.2)
RBC # BLD AUTO: 3.98 M/UL (ref 4.2–5.4)
SODIUM SERPL-SCNC: 141 MMOL/L (ref 135–145)
WBC # BLD AUTO: 5.4 K/UL (ref 4.8–10.8)

## 2021-10-13 PROCEDURE — 82306 VITAMIN D 25 HYDROXY: CPT

## 2021-10-13 PROCEDURE — 99213 OFFICE O/P EST LOW 20 MIN: CPT | Performed by: PHYSICAL MEDICINE & REHABILITATION

## 2021-10-13 PROCEDURE — 80053 COMPREHEN METABOLIC PANEL: CPT

## 2021-10-13 PROCEDURE — 36415 COLL VENOUS BLD VENIPUNCTURE: CPT

## 2021-10-13 PROCEDURE — 85025 COMPLETE CBC W/AUTO DIFF WBC: CPT

## 2021-10-13 ASSESSMENT — PAIN SCALES - GENERAL: PAINLEVEL: NO PAIN

## 2021-10-13 ASSESSMENT — FIBROSIS 4 INDEX: FIB4 SCORE: 3.49

## 2021-10-13 ASSESSMENT — PATIENT HEALTH QUESTIONNAIRE - PHQ9
5. POOR APPETITE OR OVEREATING: 0 - NOT AT ALL
SUM OF ALL RESPONSES TO PHQ QUESTIONS 1-9: 2
CLINICAL INTERPRETATION OF PHQ2 SCORE: 1

## 2021-10-13 NOTE — Clinical Note
Denisse Hammer continues to do very well and has no pain from her cervical spondylosis and cervicogenic headache since the radiofrequency neurotomy.  She can follow-up with me as needed.  Benson

## 2021-10-13 NOTE — PROGRESS NOTES
Follow up patient note  Interventional spine and sports physiatry, Physical medicine rehabilitation      Chief complaint:   Chief Complaint   Patient presents with   • Follow-Up     headaches         HISTORY    Please see new patient note dated  by Dr Ventura,  for more details.     HPI  Patient identification: Yajaira Eduardo  1945,   female  With Diagnoses of Spondylosis of cervical region without myelopathy or radiculopathy, Cervicogenic headache, and Cervical spinal stenosis were pertinent to this visit.   Diagnostic medial branch block targeting the left C2-3 and C3-4 facet joints done on 2019 and 2019 with 100% pain relief post procedure.  2019 radiofrequency neurotomy targeting the left C2-3 and C3-4 facet joints with 100% pain relief for the post procedural visit.    The patient continues to have outstanding pain relief and has 100% pain relief in her headache and neck pain.  She is not having radicular pains.  She has full range of motion in cervical spine.  There are no difficulties with ADLs.  She denies numbness tingling or weakness.         ROS Red Flags :   Fever, Chills, Sweats: Denies  Involuntary Weight Loss: Denies  Bowel/Bladder Incontinence: Denies  Saddle Anesthesia: Denies        PMHx:   Past Medical History:   Diagnosis Date   • Abnormal mammogram, unspecified     09 Diagnostic mammo L breast:  Negative (next 09)   • Cervicalgia     controlled   • Current moderate episode of major depressive disorder without prior episode (HCC) 2019   • Cystocele, midline     uncontrolled   • Degenerative disc disease, cervical 2019   • Diarrhea following gastrointestinal surgery 2010   • Fracture closed, mandible     after trauma   • Glucose intolerance (impaired glucose tolerance) 10/23/2017   • Lactose intolerance    • Leukopenia 10/26/2010   • Multinodular goiter (nontoxic) 2019   • OA (osteoarthritis) 2010   • Postmenopausal atrophic vaginitis  4/26/2010   • Pure hypercholesterolemia 4/26/2010   • S/P colonoscopy     1996: Normal   • Seasonal and perennial allergic rhinoconjunctivitis     hayfever, no rx   • Thoracic or lumbosacral neuritis or radiculitis, unspecified     controlled   • Unspecified vitamin D deficiency 4/26/2010   • Urinary bladder disorder        PSHx:   Past Surgical History:   Procedure Laterality Date   • COLPOSACROPEXY ROBOTIC  8/22/2013    Performed by Elisa Jones M.D. at SURGERY [facility]   • CYSTOSCOPY  8/22/2013    Performed by Elisa Jones M.D. at SURGERY [facility]   • BLADDER SLING FEMALE     • BREAST BIOPSY      hx of left breast biopsy-benign   • PHU BY LAPAROSCOPY     • HYSTERECTOMY, VAGINAL      without BSO   • TONSILLECTOMY     • US-NEEDLE CORE BX-BREAST PANEL         Family history   Family History   Problem Relation Age of Onset   • Cancer Mother    • Diabetes Father    • Heart Disease Father         Atrial Fib         Medications:   Outpatient Medications Marked as Taking for the 10/13/21 encounter (Office Visit) with Benson Ventura M.D.   Medication Sig Dispense Refill   • Calcium Carb-Cholecalciferol 600-400 MG-UNIT Tab Take 1 Tablet by mouth.     • DULoxetine (CYMBALTA) 30 MG Cap DR Particles Take 30 mg by mouth every day.     • Glucosamine-Chondroit-Vit C-Mn (GLUCOSAMINE 1500 COMPLEX) Cap Take  by mouth.     • Cholestyramine Light 4 GM Powder Take 4 g by mouth every day. 3 Can 3   • Cyanocobalamin (B-12) 1000 MCG SL Tab Place  under tongue.     • Multiple Vitamins-Minerals (CENTRUM SILVER ADULT 50+ PO) Take  by mouth.     • Flaxseed, Linseed, (FLAXSEED OIL) 1200 MG CAPS Take  by mouth.     • ascorbic acid (ASCORBIC ACID) 500 MG TABS Take 500 mg by mouth every day.     • vitamin D (CHOLECALCIFEROL) 1000 UNIT TABS Take 6,000 Units by mouth every day.          Current Outpatient Medications on File Prior to Visit   Medication Sig Dispense Refill   • Calcium Carb-Cholecalciferol 600-400  MG-UNIT Tab Take 1 Tablet by mouth.     • DULoxetine (CYMBALTA) 30 MG Cap DR Particles Take 30 mg by mouth every day.     • Glucosamine-Chondroit-Vit C-Mn (GLUCOSAMINE 1500 COMPLEX) Cap Take  by mouth.     • Cholestyramine Light 4 GM Powder Take 4 g by mouth every day. 3 Can 3   • Cyanocobalamin (B-12) 1000 MCG SL Tab Place  under tongue.     • Multiple Vitamins-Minerals (CENTRUM SILVER ADULT 50+ PO) Take  by mouth.     • Flaxseed, Linseed, (FLAXSEED OIL) 1200 MG CAPS Take  by mouth.     • ascorbic acid (ASCORBIC ACID) 500 MG TABS Take 500 mg by mouth every day.     • vitamin D (CHOLECALCIFEROL) 1000 UNIT TABS Take 6,000 Units by mouth every day.       No current facility-administered medications on file prior to visit.         Allergies:   Allergies   Allergen Reactions   • Other Environmental Runny Nose     hayfever-runny nose       Social Hx:   Social History     Socioeconomic History   • Marital status:      Spouse name: Not on file   • Number of children: Not on file   • Years of education: Not on file   • Highest education level: Not on file   Occupational History   • Not on file   Tobacco Use   • Smoking status: Former Smoker     Packs/day: 1.00     Years: 30.00     Pack years: 30.00     Types: Cigarettes     Quit date: 1992     Years since quittin.8   • Smokeless tobacco: Never Used   • Tobacco comment:    Vaping Use   • Vaping Use: Never used   Substance and Sexual Activity   • Alcohol use: Yes     Alcohol/week: 7.0 oz     Types: 14 Glasses of wine per week     Comment: Wine w/Dinner    • Drug use: No   • Sexual activity: Yes     Partners: Male     Birth control/protection: Post-Menopausal     Comment: , retired   Other Topics Concern   •  Service No   • Blood Transfusions No   • Caffeine Concern Yes   • Occupational Exposure No   • Hobby Hazards No   • Sleep Concern No   • Stress Concern No   • Weight Concern No   • Special Diet No   • Back Care No   • Exercise Yes      "Comment: golfs 4 times per week   • Bike Helmet No   • Seat Belt Yes   • Self-Exams No   Social History Narrative    , retired, spends half the year in [facility]     Social Determinants of Health     Financial Resource Strain:    • Difficulty of Paying Living Expenses:    Food Insecurity:    • Worried About Running Out of Food in the Last Year:    • Ran Out of Food in the Last Year:    Transportation Needs:    • Lack of Transportation (Medical):    • Lack of Transportation (Non-Medical):    Physical Activity:    • Days of Exercise per Week:    • Minutes of Exercise per Session:    Stress:    • Feeling of Stress :    Social Connections:    • Frequency of Communication with Friends and Family:    • Frequency of Social Gatherings with Friends and Family:    • Attends Bahai Services:    • Active Member of Clubs or Organizations:    • Attends Club or Organization Meetings:    • Marital Status:    Intimate Partner Violence:    • Fear of Current or Ex-Partner:    • Emotionally Abused:    • Physically Abused:    • Sexually Abused:          EXAMINATION     Physical Exam:   Vitals: /78 (BP Location: Right arm, Patient Position: Sitting, BP Cuff Size: Adult)   Pulse 81   Temp 36.4 °C (97.6 °F) (Temporal)   Ht 1.651 m (5' 5\")   Wt 73.5 kg (162 lb 0.6 oz)   SpO2 97%     Constitutional:   Body Habitus: Body mass index is 26.96 kg/m².  Cooperation: Fully cooperates with exam  Appearance: Well-groomed no disheveled    Respiratory-  breathing comfortable on room air, no audible wheezing  Cardiovascular- capillary refills less than 2 seconds. No lower extremity edema is noted.   Psychiatric- alert and oriented ×3. Normal affect.    MSK/NEURO:      Cervical spine  There are no signs of infection around the injection sites.     full  active range of motion with flexion, lateral flexion, and rotation bilaterally.   There is full  active range of motion with cervical extension.      Palpation:   Tenderness to " palpation throughout the cervical spine: negative bilaterally        Cervical spine Special tests  Neuro tension  Spurling's maneuver negative bilaterally           Key points for the international standards for neurological classification of spinal cord injury (ISNCSCI) to light touch.     Dermatome R L   C4 2 2   C5 2 2   C6 2 2   C7 2 2   C8 2 2   T1 2 2   T2 2 2                                         Motor Exam Upper Extremities   ? Myotome R L   Shoulder flexion C5 5 5   Elbow flexion C5 5 5   Wrist extension C6 5 5   Elbow extension C7 5 5   Finger flexion C8 5 5   Finger abduction T1 5 5           MEDICAL DECISION MAKING    DATA    Labs:   Lab Results   Component Value Date/Time    SODIUM 137 10/12/2020 07:25 AM    POTASSIUM 4.4 10/12/2020 07:25 AM    CHLORIDE 102 10/12/2020 07:25 AM    CO2 29 10/12/2020 07:25 AM    GLUCOSE 101 (H) 10/12/2020 07:25 AM    BUN 20 10/12/2020 07:25 AM    CREATININE 0.75 10/12/2020 07:25 AM    CREATININE 0.9 10/23/2007 07:36 AM        Lab Results   Component Value Date/Time    PROTHROMBTM 12.1 09/02/2008 02:48 PM    INR 1.04 09/02/2008 02:48 PM        Lab Results   Component Value Date/Time    WBC 4.3 (L) 10/12/2020 07:25 AM    WBC 4.4 01/26/2011 07:20 AM    WBC 4.4 01/26/2011 07:20 AM    RBC 4.18 (L) 10/12/2020 07:25 AM    RBC 4.07 01/26/2011 07:20 AM    RBC 4.07 01/26/2011 07:20 AM    HEMOGLOBIN 13.5 10/12/2020 07:25 AM    HEMATOCRIT 41.5 10/12/2020 07:25 AM    MCV 99.3 (H) 10/12/2020 07:25 AM    MCV 94 01/26/2011 07:20 AM    MCV 94 01/26/2011 07:20 AM    MCH 32.3 10/12/2020 07:25 AM    MCH 31.7 01/26/2011 07:20 AM    MCH 31.7 01/26/2011 07:20 AM    MCHC 32.5 (L) 10/12/2020 07:25 AM    MPV 11.8 10/12/2020 07:25 AM    NEUTSPOLYS 53.20 10/12/2020 07:25 AM    LYMPHOCYTES 36.80 10/12/2020 07:25 AM    MONOCYTES 7.20 10/12/2020 07:25 AM    EOSINOPHILS 2.10 10/12/2020 07:25 AM    BASOPHILS 0.50 10/12/2020 07:25 AM        Lab Results   Component Value Date/Time    HBA1C 4.9 10/12/2020  07:25 AM          DIAGNOSIS   Visit Diagnoses     ICD-10-CM   1. Spondylosis of cervical region without myelopathy or radiculopathy  M47.812   2. Cervicogenic headache  G44.86   3. Cervical spinal stenosis  M48.02         ASSESSMENT and PLAN:     Yajaira Eduardo  1945,   female      Yajaira was seen today for follow-up.    Diagnoses and all orders for this visit:    Spondylosis of cervical region without myelopathy or radiculopathy    Cervicogenic headache    Cervical spinal stenosis        The above issues are stable and she continues to get outstanding pain relief after the radiofrequency neurotomy.  She is neurologically intact.  The patient can do home exercises and stretches in the interim. We previously discussed emergency precautions.  She uses Cymbalta which is reasonable and this can be helpful for pain and mood.        Follow up: As needed with me    Thank you for allowing me to participate in the care of this patient. If you have any questions please not hesitate to contact me.        Please note that this dictation was created using voice recognition software. I have made every reasonable attempt to correct obvious errors but there may be errors of grammar and content that I may have overlooked prior to finalization of this note.      Benson Ventura MD  Interventional Spine and Sports Physiatry  Physical Medicine and Rehabilitation  [facility]

## 2021-10-20 ENCOUNTER — PATIENT MESSAGE (OUTPATIENT)
Dept: INTERNAL MEDICINE | Facility: IMAGING CENTER | Age: 76
End: 2021-10-20

## 2021-10-20 ENCOUNTER — TELEPHONE (OUTPATIENT)
Dept: OBGYN | Facility: CLINIC | Age: 76
End: 2021-10-20

## 2021-10-20 ENCOUNTER — GYNECOLOGY VISIT (OUTPATIENT)
Dept: OBGYN | Facility: CLINIC | Age: 76
End: 2021-10-20
Payer: MEDICARE

## 2021-10-20 VITALS — WEIGHT: 163 LBS | BODY MASS INDEX: 27.12 KG/M2 | SYSTOLIC BLOOD PRESSURE: 161 MMHG | DIASTOLIC BLOOD PRESSURE: 87 MMHG

## 2021-10-20 DIAGNOSIS — N95.2 POSTMENOPAUSAL ATROPHIC VAGINITIS: Chronic | ICD-10-CM

## 2021-10-20 DIAGNOSIS — N81.4 CYSTOCELE WITH PROLAPSE: ICD-10-CM

## 2021-10-20 PROBLEM — N81.10 BLADDER PROLAPSE, FEMALE, ACQUIRED: Status: ACTIVE | Noted: 2021-10-20

## 2021-10-20 PROCEDURE — 99213 OFFICE O/P EST LOW 20 MIN: CPT | Performed by: OBSTETRICS & GYNECOLOGY

## 2021-10-20 RX ORDER — ESTRADIOL 0.1 MG/G
CREAM VAGINAL
Qty: 42.5 G | Refills: 11 | Status: SHIPPED | OUTPATIENT
Start: 2021-10-20 | End: 2021-10-21 | Stop reason: SDUPTHER

## 2021-10-20 ASSESSMENT — FIBROSIS 4 INDEX: FIB4 SCORE: 3.07

## 2021-10-20 NOTE — NON-PROVIDER
Pt here today for GYN appt.  Started Impressa on 07/08/2021  Phone # [phone] (home)   Pharmacy verified.  Pt has been using [facility].

## 2021-10-20 NOTE — TELEPHONE ENCOUNTER
Pt called in and states that she is unable to get her Estradiol prescription due to being expensive. Informed provider and has had sent new order to [facility]. Informed Pt and provided info to the pharmacy. Pt has no further questions. Confirmed faxed Rx order sent to pharmacy, scanned in media.

## 2021-10-20 NOTE — PROGRESS NOTES
Chief Complaint   Patient presents with   • Gynecologic Exam       History of present illness: 76 y.o. presents with follow up for pelvic organ prolapse. She tried the Impressa device over the counter but did not like it. Instead she has been using a Tampax tampon during exercise (which is the only time she needs anything) and it has been working.   She states she still has to reduce the prolapse a couple times a week in the shower but it seems to be okay.  She is okay with her current situation.  She is planning on going to [facility] to spend the next 6 months.    Review of systems:  Pertinent positives documented in HPI and all other systems reviewed & are negative    PGYNHx:   Hx of hyst for VB in her 30s  Hx of l-scope sacrocolpopexy  for prolapse.    Sexually active with     POBHx:   OB History    Para Term  AB Living   2 2 2     2   SAB TAB Ectopic Molar Multiple Live Births             2      # Outcome Date GA Lbr Hayden/2nd Weight Sex Delivery Anes PTL Lv   2 Term      NORMAL SPONT      1 Term      NORMAL SPONT            All PMH, PSH, allergies, social history and FH reviewed and updated today:  Past Medical History:   Diagnosis Date   • Abnormal mammogram, unspecified     09 Diagnostic mammo L breast:  Negative (next 09)   • Cervicalgia     controlled   • Current moderate episode of major depressive disorder without prior episode (HCC) 2019   • Cystocele, midline     uncontrolled   • Degenerative disc disease, cervical 2019   • Diarrhea following gastrointestinal surgery 2010   • Fracture closed, mandible     after trauma   • Glucose intolerance (impaired glucose tolerance) 10/23/2017   • Lactose intolerance    • Leukopenia 10/26/2010   • Multinodular goiter (nontoxic) 2019   • OA (osteoarthritis) 2010   • Postmenopausal atrophic vaginitis 2010   • Pure hypercholesterolemia 2010   • S/P colonoscopy     1996: Normal   • Seasonal and  perennial allergic rhinoconjunctivitis     hayfever, no rx   • Thoracic or lumbosacral neuritis or radiculitis, unspecified     controlled   • Unspecified vitamin D deficiency 2010   • Urinary bladder disorder        Past Surgical History:   Procedure Laterality Date   • COLPOSACROPEXY ROBOTIC  2013    Performed by Elisa Jones M.D. at [facility]   • CYSTOSCOPY  2013    Performed by Elisa Jones M.D. at SUR[facility]   • BLADDER SLING FEMALE     • BREAST BIOPSY      hx of left breast biopsy-benign   • PHU BY LAPAROSCOPY     • HYSTERECTOMY, VAGINAL      without BSO   • TONSILLECTOMY     • US-NEEDLE CORE BX-BREAST PANEL         Allergies:   Allergies   Allergen Reactions   • Other Environmental Runny Nose     hayfever-runny nose       Social History     Socioeconomic History   • Marital status:      Spouse name: Not on file   • Number of children: Not on file   • Years of education: Not on file   • Highest education level: Not on file   Occupational History   • Not on file   Tobacco Use   • Smoking status: Former Smoker     Packs/day: 1.00     Years: 30.00     Pack years: 30.00     Types: Cigarettes     Quit date: 1992     Years since quittin.8   • Smokeless tobacco: Never Used   • Tobacco comment:    Vaping Use   • Vaping Use: Never used   Substance and Sexual Activity   • Alcohol use: Yes     Alcohol/week: 7.0 oz     Types: 14 Glasses of wine per week     Comment: Wine w/Dinner    • Drug use: No   • Sexual activity: Yes     Partners: Male     Birth control/protection: Post-Menopausal     Comment: , retired   Other Topics Concern   •  Service No   • Blood Transfusions No   • Caffeine Concern Yes   • Occupational Exposure No   • Hobby Hazards No   • Sleep Concern No   • Stress Concern No   • Weight Concern No   • Special Diet No   • Back Care No   • Exercise Yes     Comment: golfs 4 times per week   • Bike Helmet No   • Seat Belt  Yes   • Self-Exams No   Social History Narrative    , retired, spends half the year in [facility]     Social Determinants of Health     Financial Resource Strain:    • Difficulty of Paying Living Expenses:    Food Insecurity:    • Worried About Running Out of Food in the Last Year:    • Ran Out of Food in the Last Year:    Transportation Needs:    • Lack of Transportation (Medical):    • Lack of Transportation (Non-Medical):    Physical Activity:    • Days of Exercise per Week:    • Minutes of Exercise per Session:    Stress:    • Feeling of Stress :    Social Connections:    • Frequency of Communication with Friends and Family:    • Frequency of Social Gatherings with Friends and Family:    • Attends Rastafari Services:    • Active Member of Clubs or Organizations:    • Attends Club or Organization Meetings:    • Marital Status:    Intimate Partner Violence:    • Fear of Current or Ex-Partner:    • Emotionally Abused:    • Physically Abused:    • Sexually Abused:        Family History   Problem Relation Age of Onset   • Cancer Mother    • Diabetes Father    • Heart Disease Father         Atrial Fib       Physical exam:  BP (!) 161/87 (BP Location: Right arm, Patient Position: Sitting)   Wt 73.9 kg (163 lb)     General:appears stated age, is in no apparent distress  Head: normocephalic, non-tender  Neck: neck is supple, no jugular venous distension  CV : no peripheral edema  Lungs: Normal respiratory effort.   Abdomen: Bowel sounds positive, nondistended, soft, nontender x4, no rebound or guarding. No organomegaly. No masses.  Female GYN:   Normal postmenopausal appearing external genitalia.  On palpation, the previously inserted sling or scar tissue is palpated at just underneath the pubic symphysis however no mesh is exposed.  The bladder prolapses to about 0 or +1 on Valsalva however is easily reduced.  On speculum examination there is no excoriation or bleeding however the vaginal mucosa is dry and  pale.  Skin: No rashes, or ulcers or lesions seen  Psychiatric: Patient shows appropriate affect, is alert and oriented x3, intact judgment and insight.      Assessment  1. Postmenopausal atrophic vaginitis     2. Cystocele with prolapse         Plan  - 76 y.o.  here following up for pelvic organ prolapse.  -Patient may continue using tampons as needed during exercise.  She was also encouraged to do Kegel exercises which may help additionally with her prolapse.    Also advised that she would benefit from use of vaginal estrogen.  Estrace cream 0.1 mg/g called into the pharmacy.  She should use 0.5 g per vagina at night for 7 days then decrease to 2-3 times weekly.  Discussed that she may continue to have intercourse with this medication and that it may even make sexual activity less uncomfortable.  Prescription was sent to [facility].  Advised patient that if it is cost prohibitive, we may send to [facility] pharmacy.    - Follow up 6 months

## 2021-10-21 RX ORDER — ESTRADIOL 0.1 MG/G
CREAM VAGINAL
Qty: 3 EACH | Refills: 3 | Status: SHIPPED | OUTPATIENT
Start: 2021-10-21 | End: 2022-10-31

## 2021-10-21 NOTE — PROGRESS NOTES
Patient called requesting 3-month supply be sent to [facility] pharmacy as it is only $75 through [facility].  Estrace prescription sent.

## 2021-12-15 ENCOUNTER — OFFICE VISIT (OUTPATIENT)
Dept: URBAN - METROPOLITAN AREA CLINIC 48 | Facility: CLINIC | Age: 76
End: 2021-12-15
Payer: MEDICARE

## 2021-12-15 PROCEDURE — 92134 CPTRZ OPH DX IMG PST SGM RTA: CPT | Performed by: OPHTHALMOLOGY

## 2021-12-15 PROCEDURE — 99213 OFFICE O/P EST LOW 20 MIN: CPT | Performed by: OPHTHALMOLOGY

## 2021-12-15 ASSESSMENT — INTRAOCULAR PRESSURE
OD: 14
OS: 12

## 2021-12-15 NOTE — IMPRESSION/PLAN
Impression: Exudative age-rel mclr degn, bi, with actv chrdl neovas: H35.3231. Bilateral. Plan: OCT ordered and performed today. Discussed diagnosis in detail with patient. Discussed treatment options with patient. Discussed risks and benefits and patient understands. Recommend a Eylea injection in both eyes today and then every 6-8 weeks thereafter. The patient understands and agrees with the plan.
weight-bearing as tolerated

## 2021-12-22 ENCOUNTER — OFFICE VISIT (OUTPATIENT)
Dept: URBAN - METROPOLITAN AREA CLINIC 48 | Facility: CLINIC | Age: 76
End: 2021-12-22
Payer: MEDICARE

## 2021-12-22 PROCEDURE — 92012 INTRM OPH EXAM EST PATIENT: CPT | Performed by: OPHTHALMOLOGY

## 2021-12-22 ASSESSMENT — INTRAOCULAR PRESSURE
OD: 19
OS: 16

## 2021-12-22 NOTE — IMPRESSION/PLAN
Impression: Anatomical narrow angle, bilateral: H40.033. Plan: Discussed and reviewed diagnosis with patient today, understood by patient, intraocular pressure at/close to target without medication. Continue without medication and observe. Will continue to monitor. 


Patient to keep seeing Dr. Galilea Stone  if needed may be sent back to glaucoma clinic

## 2022-02-09 ENCOUNTER — PROCEDURE (OUTPATIENT)
Dept: URBAN - METROPOLITAN AREA CLINIC 48 | Facility: CLINIC | Age: 77
End: 2022-02-09
Payer: MEDICARE

## 2022-04-12 ENCOUNTER — OFFICE VISIT (OUTPATIENT)
Dept: URBAN - METROPOLITAN AREA CLINIC 48 | Facility: CLINIC | Age: 77
End: 2022-04-12
Payer: MEDICARE

## 2022-04-12 DIAGNOSIS — H35.3231 EXUDATIVE AGE-REL MCLR DEGN, BI, WITH ACTV CHRDL NEOVAS: Primary | ICD-10-CM

## 2022-04-12 PROCEDURE — 92014 COMPRE OPH EXAM EST PT 1/>: CPT | Performed by: OPHTHALMOLOGY

## 2022-04-12 PROCEDURE — 92134 CPTRZ OPH DX IMG PST SGM RTA: CPT | Performed by: OPHTHALMOLOGY

## 2022-04-12 NOTE — IMPRESSION/PLAN
Impression: Exudative age-rel mclr degn, bi, with actv chrdl neovas: H35.3231. Bilateral.

S/P Lucentis Plan: OCT ordered and performed today. Discussed diagnosis in detail with patient. After review of chart changes start around 8 weeks. Recommend injection today.  


Schedule Lucentis OU x1
#1 today 
then see Ophthamologist in [place] in 6 weeks

## 2022-05-16 ENCOUNTER — HOSPITAL ENCOUNTER (OUTPATIENT)
Dept: LAB | Facility: MEDICAL CENTER | Age: 77
End: 2022-05-16
Attending: FAMILY MEDICINE
Payer: MEDICARE

## 2022-05-16 DIAGNOSIS — M75.40 ROTATOR CUFF IMPINGEMENT SYNDROME, UNSPECIFIED LATERALITY: ICD-10-CM

## 2022-05-16 DIAGNOSIS — D70.9 CHRONIC NEUTROPENIA (HCC): ICD-10-CM

## 2022-05-16 DIAGNOSIS — R79.9 ABNORMAL FINDING OF BLOOD CHEMISTRY, UNSPECIFIED: ICD-10-CM

## 2022-05-16 DIAGNOSIS — E04.2 MULTINODULAR GOITER (NONTOXIC): ICD-10-CM

## 2022-05-16 LAB
ALBUMIN SERPL BCP-MCNC: 4.2 G/DL (ref 3.2–4.9)
ALBUMIN/GLOB SERPL: 1.6 G/DL
ALP SERPL-CCNC: 101 U/L (ref 30–99)
ALT SERPL-CCNC: 17 U/L (ref 2–50)
ANION GAP SERPL CALC-SCNC: 11 MMOL/L (ref 7–16)
AST SERPL-CCNC: 20 U/L (ref 12–45)
BASOPHILS # BLD AUTO: 0.3 % (ref 0–1.8)
BASOPHILS # BLD: 0.02 K/UL (ref 0–0.12)
BILIRUB SERPL-MCNC: 0.6 MG/DL (ref 0.1–1.5)
BUN SERPL-MCNC: 18 MG/DL (ref 8–22)
CALCIUM SERPL-MCNC: 9.2 MG/DL (ref 8.5–10.5)
CHLORIDE SERPL-SCNC: 104 MMOL/L (ref 96–112)
CHOLEST SERPL-MCNC: 198 MG/DL (ref 100–199)
CO2 SERPL-SCNC: 23 MMOL/L (ref 20–33)
CREAT SERPL-MCNC: 0.8 MG/DL (ref 0.5–1.4)
EOSINOPHIL # BLD AUTO: 0.1 K/UL (ref 0–0.51)
EOSINOPHIL NFR BLD: 1.7 % (ref 0–6.9)
ERYTHROCYTE [DISTWIDTH] IN BLOOD BY AUTOMATED COUNT: 48.3 FL (ref 35.9–50)
FASTING STATUS PATIENT QL REPORTED: NORMAL
GFR SERPLBLD CREATININE-BSD FMLA CKD-EPI: 77 ML/MIN/1.73 M 2
GLOBULIN SER CALC-MCNC: 2.6 G/DL (ref 1.9–3.5)
GLUCOSE SERPL-MCNC: 103 MG/DL (ref 65–99)
HCT VFR BLD AUTO: 40.1 % (ref 37–47)
HDLC SERPL-MCNC: 96 MG/DL
HGB BLD-MCNC: 13.1 G/DL (ref 12–16)
IMM GRANULOCYTES # BLD AUTO: 0 K/UL (ref 0–0.11)
IMM GRANULOCYTES NFR BLD AUTO: 0 % (ref 0–0.9)
LDLC SERPL CALC-MCNC: 90 MG/DL
LYMPHOCYTES # BLD AUTO: 1.64 K/UL (ref 1–4.8)
LYMPHOCYTES NFR BLD: 28 % (ref 22–41)
MCH RBC QN AUTO: 31.6 PG (ref 27–33)
MCHC RBC AUTO-ENTMCNC: 32.7 G/DL (ref 33.6–35)
MCV RBC AUTO: 96.6 FL (ref 81.4–97.8)
MONOCYTES # BLD AUTO: 0.38 K/UL (ref 0–0.85)
MONOCYTES NFR BLD AUTO: 6.5 % (ref 0–13.4)
NEUTROPHILS # BLD AUTO: 3.71 K/UL (ref 2–7.15)
NEUTROPHILS NFR BLD: 63.5 % (ref 44–72)
NRBC # BLD AUTO: 0 K/UL
NRBC BLD-RTO: 0 /100 WBC
PLATELET # BLD AUTO: 151 K/UL (ref 164–446)
PMV BLD AUTO: 11.3 FL (ref 9–12.9)
POTASSIUM SERPL-SCNC: 4.3 MMOL/L (ref 3.6–5.5)
PROT SERPL-MCNC: 6.8 G/DL (ref 6–8.2)
RBC # BLD AUTO: 4.15 M/UL (ref 4.2–5.4)
SODIUM SERPL-SCNC: 138 MMOL/L (ref 135–145)
TRIGL SERPL-MCNC: 59 MG/DL (ref 0–149)
WBC # BLD AUTO: 5.9 K/UL (ref 4.8–10.8)

## 2022-05-16 PROCEDURE — 84480 ASSAY TRIIODOTHYRONINE (T3): CPT

## 2022-05-16 PROCEDURE — 84443 ASSAY THYROID STIM HORMONE: CPT

## 2022-05-16 PROCEDURE — 85025 COMPLETE CBC W/AUTO DIFF WBC: CPT

## 2022-05-16 PROCEDURE — 84439 ASSAY OF FREE THYROXINE: CPT

## 2022-05-16 PROCEDURE — 80053 COMPREHEN METABOLIC PANEL: CPT

## 2022-05-16 PROCEDURE — 80061 LIPID PANEL: CPT

## 2022-05-16 PROCEDURE — 82306 VITAMIN D 25 HYDROXY: CPT | Mod: GA

## 2022-05-16 PROCEDURE — 82306 VITAMIN D 25 HYDROXY: CPT

## 2022-05-16 PROCEDURE — 36415 COLL VENOUS BLD VENIPUNCTURE: CPT | Mod: GA

## 2022-05-17 LAB
T3 SERPL-MCNC: 94.9 NG/DL (ref 60–181)
T4 FREE SERPL-MCNC: 1.06 NG/DL (ref 0.93–1.7)
TSH SERPL DL<=0.005 MIU/L-ACNC: 1.79 UIU/ML (ref 0.38–5.33)

## 2022-05-20 ENCOUNTER — OFFICE VISIT (OUTPATIENT)
Dept: INTERNAL MEDICINE | Facility: IMAGING CENTER | Age: 77
End: 2022-05-20
Payer: MEDICARE

## 2022-05-20 VITALS
DIASTOLIC BLOOD PRESSURE: 88 MMHG | SYSTOLIC BLOOD PRESSURE: 128 MMHG | HEIGHT: 65 IN | WEIGHT: 154 LBS | TEMPERATURE: 98.2 F | BODY MASS INDEX: 25.66 KG/M2 | HEART RATE: 77 BPM | RESPIRATION RATE: 16 BRPM | OXYGEN SATURATION: 97 %

## 2022-05-20 DIAGNOSIS — F32.1 CURRENT MODERATE EPISODE OF MAJOR DEPRESSIVE DISORDER WITHOUT PRIOR EPISODE (HCC): ICD-10-CM

## 2022-05-20 DIAGNOSIS — D69.6 THROMBOCYTOPENIA (HCC): ICD-10-CM

## 2022-05-20 DIAGNOSIS — I73.00 RAYNAUD'S PHENOMENON WITHOUT GANGRENE: ICD-10-CM

## 2022-05-20 DIAGNOSIS — D70.9 CHRONIC NEUTROPENIA (HCC): ICD-10-CM

## 2022-05-20 LAB — 25(OH)D3 SERPL-MCNC: 45 NG/ML (ref 30–80)

## 2022-05-20 PROCEDURE — 99214 OFFICE O/P EST MOD 30 MIN: CPT | Performed by: FAMILY MEDICINE

## 2022-05-20 ASSESSMENT — FIBROSIS 4 INDEX: FIB4 SCORE: 2.41

## 2022-05-20 ASSESSMENT — PATIENT HEALTH QUESTIONNAIRE - PHQ9: CLINICAL INTERPRETATION OF PHQ2 SCORE: 0

## 2022-05-20 NOTE — ASSESSMENT & PLAN NOTE
This is a new problem that has been coming more and more and so far she can tolerate this.  We discussed medications that might be helpful and what she can do to try and mitigate the severity and number of occurrences.

## 2022-05-20 NOTE — ASSESSMENT & PLAN NOTE
This is a chronic health problem that is well controlled with current medications and she is doing well. She denies Homicidal or Suicidal ideation.  Her  continues to comment that her mood is good and stable with current dose of duloxetine 30 mg daily.

## 2022-05-20 NOTE — ASSESSMENT & PLAN NOTE
This is a chronic health problem that was found when the patient was seen way back in 2018 her platelet count was 159,000 its been fairly stable.  She has lab work with her from [place] in January of this year was 150,000 this time it was 151,000.  Were going to just keep an eye on that she is not showing any progression.

## 2022-10-06 ENCOUNTER — HOSPITAL ENCOUNTER (OUTPATIENT)
Dept: LAB | Facility: MEDICAL CENTER | Age: 77
End: 2022-10-06
Attending: NURSE PRACTITIONER
Payer: MEDICARE

## 2022-10-06 DIAGNOSIS — M25.511 ACUTE PAIN OF RIGHT SHOULDER: ICD-10-CM

## 2022-10-06 LAB
BASOPHILS # BLD AUTO: 0.5 % (ref 0–1.8)
BASOPHILS # BLD: 0.03 K/UL (ref 0–0.12)
CRP SERPL HS-MCNC: 0.72 MG/DL (ref 0–0.75)
EOSINOPHIL # BLD AUTO: 0.12 K/UL (ref 0–0.51)
EOSINOPHIL NFR BLD: 2 % (ref 0–6.9)
ERYTHROCYTE [DISTWIDTH] IN BLOOD BY AUTOMATED COUNT: 51.3 FL (ref 35.9–50)
ERYTHROCYTE [SEDIMENTATION RATE] IN BLOOD BY WESTERGREN METHOD: 6 MM/HOUR (ref 0–25)
HCT VFR BLD AUTO: 38.8 % (ref 37–47)
HGB BLD-MCNC: 13 G/DL (ref 12–16)
IMM GRANULOCYTES # BLD AUTO: 0.02 K/UL (ref 0–0.11)
IMM GRANULOCYTES NFR BLD AUTO: 0.3 % (ref 0–0.9)
LYMPHOCYTES # BLD AUTO: 1.63 K/UL (ref 1–4.8)
LYMPHOCYTES NFR BLD: 27.4 % (ref 22–41)
MCH RBC QN AUTO: 32.7 PG (ref 27–33)
MCHC RBC AUTO-ENTMCNC: 33.5 G/DL (ref 33.6–35)
MCV RBC AUTO: 97.5 FL (ref 81.4–97.8)
MONOCYTES # BLD AUTO: 0.3 K/UL (ref 0–0.85)
MONOCYTES NFR BLD AUTO: 5 % (ref 0–13.4)
NEUTROPHILS # BLD AUTO: 3.85 K/UL (ref 2–7.15)
NEUTROPHILS NFR BLD: 64.8 % (ref 44–72)
NRBC # BLD AUTO: 0 K/UL
NRBC BLD-RTO: 0 /100 WBC
PLATELET # BLD AUTO: 130 K/UL (ref 164–446)
PMV BLD AUTO: 11.6 FL (ref 9–12.9)
RBC # BLD AUTO: 3.98 M/UL (ref 4.2–5.4)
WBC # BLD AUTO: 6 K/UL (ref 4.8–10.8)

## 2022-10-06 PROCEDURE — 85652 RBC SED RATE AUTOMATED: CPT

## 2022-10-06 PROCEDURE — 85025 COMPLETE CBC W/AUTO DIFF WBC: CPT

## 2022-10-06 PROCEDURE — 86140 C-REACTIVE PROTEIN: CPT

## 2022-10-06 PROCEDURE — 36415 COLL VENOUS BLD VENIPUNCTURE: CPT

## 2022-10-07 DIAGNOSIS — D69.6 THROMBOCYTOPENIA (HCC): ICD-10-CM

## 2022-10-07 DIAGNOSIS — E55.9 VITAMIN D DEFICIENCY: ICD-10-CM

## 2022-10-12 ENCOUNTER — HOSPITAL ENCOUNTER (OUTPATIENT)
Dept: LAB | Facility: MEDICAL CENTER | Age: 77
End: 2022-10-12
Attending: FAMILY MEDICINE
Payer: MEDICARE

## 2022-10-12 DIAGNOSIS — D69.6 THROMBOCYTOPENIA (HCC): ICD-10-CM

## 2022-10-12 DIAGNOSIS — E55.9 VITAMIN D DEFICIENCY: ICD-10-CM

## 2022-10-12 PROBLEM — M25.511 RIGHT SHOULDER PAIN: Status: ACTIVE | Noted: 2022-10-12

## 2022-10-12 PROBLEM — M67.911 TENDINOPATHY OF RIGHT ROTATOR CUFF: Status: ACTIVE | Noted: 2022-10-12

## 2022-10-12 LAB
25(OH)D3 SERPL-MCNC: 54 NG/ML (ref 30–100)
ALBUMIN SERPL BCP-MCNC: 4.4 G/DL (ref 3.2–4.9)
ALBUMIN/GLOB SERPL: 1.6 G/DL
ALP SERPL-CCNC: 97 U/L (ref 30–99)
ALT SERPL-CCNC: 15 U/L (ref 2–50)
ANION GAP SERPL CALC-SCNC: 9 MMOL/L (ref 7–16)
AST SERPL-CCNC: 22 U/L (ref 12–45)
BILIRUB SERPL-MCNC: 0.5 MG/DL (ref 0.1–1.5)
BUN SERPL-MCNC: 21 MG/DL (ref 8–22)
CALCIUM SERPL-MCNC: 9.2 MG/DL (ref 8.5–10.5)
CHLORIDE SERPL-SCNC: 105 MMOL/L (ref 96–112)
CO2 SERPL-SCNC: 26 MMOL/L (ref 20–33)
CREAT SERPL-MCNC: 0.7 MG/DL (ref 0.5–1.4)
GFR SERPLBLD CREATININE-BSD FMLA CKD-EPI: 90 ML/MIN/1.73 M 2
GLOBULIN SER CALC-MCNC: 2.7 G/DL (ref 1.9–3.5)
GLUCOSE SERPL-MCNC: 96 MG/DL (ref 65–99)
POTASSIUM SERPL-SCNC: 4.6 MMOL/L (ref 3.6–5.5)
PROT SERPL-MCNC: 7.1 G/DL (ref 6–8.2)
SODIUM SERPL-SCNC: 140 MMOL/L (ref 135–145)

## 2022-10-12 PROCEDURE — 80053 COMPREHEN METABOLIC PANEL: CPT

## 2022-10-12 PROCEDURE — 36415 COLL VENOUS BLD VENIPUNCTURE: CPT

## 2022-10-12 PROCEDURE — 82306 VITAMIN D 25 HYDROXY: CPT

## 2022-10-14 ENCOUNTER — OFFICE VISIT (OUTPATIENT)
Dept: INTERNAL MEDICINE | Facility: IMAGING CENTER | Age: 77
End: 2022-10-14
Payer: MEDICARE

## 2022-10-14 VITALS
RESPIRATION RATE: 12 BRPM | SYSTOLIC BLOOD PRESSURE: 132 MMHG | WEIGHT: 161 LBS | DIASTOLIC BLOOD PRESSURE: 78 MMHG | HEIGHT: 65 IN | HEART RATE: 80 BPM | TEMPERATURE: 97.6 F | BODY MASS INDEX: 26.82 KG/M2 | OXYGEN SATURATION: 98 %

## 2022-10-14 DIAGNOSIS — D69.6 THROMBOCYTOPENIA (HCC): ICD-10-CM

## 2022-10-14 DIAGNOSIS — G89.29 CHRONIC RIGHT SHOULDER PAIN: ICD-10-CM

## 2022-10-14 DIAGNOSIS — E55.9 VITAMIN D DEFICIENCY: ICD-10-CM

## 2022-10-14 DIAGNOSIS — M25.511 CHRONIC RIGHT SHOULDER PAIN: ICD-10-CM

## 2022-10-14 DIAGNOSIS — Z23 NEED FOR VACCINATION: ICD-10-CM

## 2022-10-14 DIAGNOSIS — F32.1 CURRENT MODERATE EPISODE OF MAJOR DEPRESSIVE DISORDER WITHOUT PRIOR EPISODE (HCC): ICD-10-CM

## 2022-10-14 PROCEDURE — 90662 IIV NO PRSV INCREASED AG IM: CPT | Performed by: FAMILY MEDICINE

## 2022-10-14 PROCEDURE — 99214 OFFICE O/P EST MOD 30 MIN: CPT | Mod: 25 | Performed by: FAMILY MEDICINE

## 2022-10-14 PROCEDURE — G0008 ADMIN INFLUENZA VIRUS VAC: HCPCS | Performed by: FAMILY MEDICINE

## 2022-10-14 ASSESSMENT — FIBROSIS 4 INDEX: FIB4 SCORE: 3.28

## 2022-10-14 NOTE — ASSESSMENT & PLAN NOTE
This is a chronic health problem that is well controlled on Cymbalta 30 mg daily.  Patient had weaned herself down on this medication but found that her mood was not as stable when she tried this.  She is gone back up to the full dose and feels good and will stay at that dose long-term.

## 2022-10-14 NOTE — ASSESSMENT & PLAN NOTE
This is a chronic health problem that has shown a slight decrease from the last readings we had in May 2022.  Currently her CBC shows a white count of 6.0, hemoglobin normal at 13, hematocrit normal at 38.8 but her platelets are down to 130,000 whereas previously they are 151,000.  Patient is currently off of daily aspirin getting ready to have shoulder surgery.  We will continue to monitor closely every 6 months.

## 2022-10-28 ENCOUNTER — PATIENT MESSAGE (OUTPATIENT)
Dept: INTERNAL MEDICINE | Facility: IMAGING CENTER | Age: 77
End: 2022-10-28
Payer: MEDICARE

## 2022-10-31 NOTE — PROGRESS NOTES
Patient is planning her mammogram when she gets down to [facility] because of her upcoming shoulder surgery.      CC: Diagnoses of Need for vaccination, Current moderate episode of major depressive disorder without prior episode (HCC), Thrombocytopenia (HCC), Chronic right shoulder pain, and Vitamin D deficiency disease were pertinent to this visit.                                                                                                                                         HPI:   Yajaira presents today with the following concerns:    1. Need for vaccination  Flu shot given today    2. Current moderate episode of major depressive disorder without prior episode (HCC)  Chronic health problem, well controlled, continue with current meds and lifestyle.      3. Thrombocytopenia (HCC)  Chronic health problem that stable but still showing some fluctuations we will continue to follow    4. Chronic right shoulder pain  Chronic health problem with upcoming surgery hopefully will relieve her chronic pain.    5. Vitamin D deficiency disease  Chronic health problem, well controlled, continue with current meds and lifestyle.         Patient Active Problem List    Diagnosis Date Noted    Tendinopathy of right rotator cuff 10/12/2022    Right shoulder pain 10/12/2022    Raynaud's phenomenon without gangrene 05/20/2022    Bladder prolapse, female, acquired 10/20/2021    Degenerative disc disease, cervical 05/20/2019    Current moderate episode of major depressive disorder without prior episode (HCC) 05/20/2019    Multinodular goiter (nontoxic) 05/20/2019    Thrombocytopenia (HCC) 10/18/2018    Right elbow pain 07/16/2018    BPPV (benign paroxysmal positional vertigo) 08/13/2017    Seasonal allergies 05/08/2014    Postmenopausal atrophic vaginitis 04/26/2010    Vitamin D deficiency disease 04/26/2010    Diarrhea following gastrointestinal surgery 04/26/2010       Current Outpatient Medications   Medication Sig Dispense  "Refill    Calcium Carb-Cholecalciferol 600-400 MG-UNIT Tab Take 1 Tablet by mouth.      DULoxetine (CYMBALTA) 30 MG Cap DR Particles Take 30 mg by mouth every day.      Cyanocobalamin (B-12) 1000 MCG SL Tab Place  under tongue.      Multiple Vitamins-Minerals (CENTRUM SILVER ADULT 50+ PO) Take  by mouth.      Flaxseed, Linseed, (FLAXSEED OIL) 1200 MG CAPS Take  by mouth.      ascorbic acid (ASCORBIC ACID) 500 MG TABS Take 500 mg by mouth every day.      vitamin D (CHOLECALCIFEROL) 1000 UNIT TABS Take 6,000 Units by mouth every day.      oxyCODONE-acetaminophen (PERCOCET) 5-325 MG Tab Take 1 Tablet by mouth every four hours as needed for Moderate Pain or Severe Pain for up to 7 days. 42 Tablet 0    HYDROcodone-acetaminophen (NORCO) 5-325 MG Tab per tablet Take 1 Tablet by mouth every four hours as needed (pain) for up to 7 days. 42 Tablet 0     No current facility-administered medications for this visit.         Allergies as of 10/14/2022 - Reviewed 10/14/2022   Allergen Reaction Noted    Hmg-coa-r inhibitors Myalgia 05/20/2022    Other environmental Runny Nose 08/22/2013            /78 (BP Location: Right arm, Patient Position: Sitting, BP Cuff Size: Adult)   Pulse 80   Temp 36.4 °C (97.6 °F) (Temporal)   Resp 12   Ht 1.651 m (5' 5\")   Wt 73 kg (161 lb)   LMP  (LMP Unknown)   SpO2 98%   BMI 26.79 kg/m²     Physical Exam:  Gen:         Alert and oriented, No apparent distress.  Neck:        No Lymphadenopathy or Bruits.  Lungs:     Clear to auscultation bilaterally  CV:          Regular rate and rhythm. No murmurs, rubs or gallops.               Ext:          No clubbing, cyanosis, edema.    LABS: 10/12/2022: Results reviewed and discussed with the patient, questions answered.      Assessment and Plan.   77 y.o. female with the following issues:    Current moderate episode of major depressive disorder without prior episode (HCC)  This is a chronic health problem that is well controlled on Cymbalta 30 mg " daily.  Patient had weaned herself down on this medication but found that her mood was not as stable when she tried this.  She is gone back up to the full dose and feels good and will stay at that dose long-term.    Thrombocytopenia (HCC)  This is a chronic health problem that has shown a slight decrease from the last readings we had in May 2022.  Currently her CBC shows a white count of 6.0, hemoglobin normal at 13, hematocrit normal at 38.8 but her platelets are down to 130,000 whereas previously they are 151,000.  Patient is currently off of daily aspirin getting ready to have shoulder surgery.  We will continue to monitor closely every 6 months.    Right shoulder pain  This is a chronic health problem that the patient informs me she will be having surgery with Dr. Camargo in October 2022.  They are hoping that they will be able to get her back to full range of motion after surgery and physical therapy.    Vitamin D deficiency disease  This is a chronic health problem that the patient had difficulty getting herself up into the normal range.  She is currently on 6000 units of vitamin D3 on a daily basis and her level came back at 54 when it was checked earlier this month.  She will continue at that dosing

## 2022-10-31 NOTE — ASSESSMENT & PLAN NOTE
This is a chronic health problem that the patient informs me she will be having surgery with Dr. Camargo in October 2022.  They are hoping that they will be able to get her back to full range of motion after surgery and physical therapy.

## 2022-10-31 NOTE — ASSESSMENT & PLAN NOTE
This is a chronic health problem that the patient had difficulty getting herself up into the normal range.  She is currently on 6000 units of vitamin D3 on a daily basis and her level came back at 54 when it was checked earlier this month.  She will continue at that dosing

## 2022-11-01 RX ORDER — DULOXETIN HYDROCHLORIDE 30 MG/1
30 CAPSULE, DELAYED RELEASE ORAL DAILY
Qty: 90 CAPSULE | Refills: 3 | Status: SHIPPED | OUTPATIENT
Start: 2022-11-01 | End: 2023-05-11 | Stop reason: SDUPTHER

## 2022-11-28 ENCOUNTER — OFFICE VISIT (OUTPATIENT)
Dept: URBAN - METROPOLITAN AREA CLINIC 48 | Facility: CLINIC | Age: 77
End: 2022-11-28
Payer: MEDICARE

## 2022-11-28 DIAGNOSIS — H35.3231 EXUDATIVE AGE-REL MCLR DEGN, BI, WITH ACTV CHRDL NEOVAS: Primary | ICD-10-CM

## 2022-11-28 PROCEDURE — 92134 CPTRZ OPH DX IMG PST SGM RTA: CPT | Performed by: OPHTHALMOLOGY

## 2022-11-28 PROCEDURE — 99214 OFFICE O/P EST MOD 30 MIN: CPT | Performed by: OPHTHALMOLOGY

## 2022-11-28 ASSESSMENT — INTRAOCULAR PRESSURE
OD: 16
OS: 13

## 2022-11-28 NOTE — IMPRESSION/PLAN
Impression: Exudative age-rel mclr alyx, bi, with actv chrdl neovas: H35.3231. Bilateral.
-s/p Avastin OU 08/2022 OCT: 
OD: [place] OS: PED, no fluid Plan: The diagnosis, natural history, and prognosis of wet AMD, as well as the risks and benefits of various treatment options including laser, PDT, Avastin, Lucentis and Eylea; along with the alternatives of observation or participation in a clinical trial, were discussed at length. The patient understands that treatment may not improve vision, but should reduce the risk of further visual loss. The patient understands that smoking is the most significant modifiable risk factor for the development of advanced AMD, and also understands that if they do smoke (or have history of smoking in the past 5 years), they cannot take vitamin A/beta-carotene, since it may increase their risk for lung cancer. Given stability of vision and exam, pt elects to proceed with observation RTC 3 months DFE OU OCT OU Re-eval Avastin

## 2023-02-20 ENCOUNTER — OFFICE VISIT (OUTPATIENT)
Facility: LOCATION | Age: 78
End: 2023-02-20
Payer: MEDICARE

## 2023-02-20 DIAGNOSIS — H35.3231 EXUDATIVE AGE-REL MCLR DEGN, BI, WITH ACTV CHRDL NEOVAS: Primary | ICD-10-CM

## 2023-02-20 PROCEDURE — 99214 OFFICE O/P EST MOD 30 MIN: CPT | Performed by: OPHTHALMOLOGY

## 2023-02-20 PROCEDURE — 92134 CPTRZ OPH DX IMG PST SGM RTA: CPT | Performed by: OPHTHALMOLOGY

## 2023-02-20 ASSESSMENT — INTRAOCULAR PRESSURE
OD: 15
OS: 14

## 2023-02-20 NOTE — IMPRESSION/PLAN
Impression: Exudative age-rel mclr alyx, hannah, with actv chrdl neovas: H35.3231. Bilateral.
-s/p Avastin OD 08/15/22 in [place]
-s/p Avastin OS 08/19/22 in [place] OCT: 
OD: [place] OS: PED, no fluid Plan: The diagnosis, natural history, and prognosis of wet AMD, as well as the risks and benefits of various treatment options including laser, PDT, Avastin, Lucentis and Eylea; along with the alternatives of observation or participation in a clinical trial, were discussed at length. The patient understands that treatment may not improve vision, but should reduce the risk of further visual loss. The patient understands that smoking is the most significant modifiable risk factor for the development of advanced AMD, and also understands that if they do smoke (or have history of smoking in the past 5 years), they cannot take vitamin A/beta-carotene, since it may increase their risk for lung cancer. Given stability of vision and exam, pt elects to proceed with observation OU. 

RTC 3 months DFE OU OCT OU Re-eval Avastin

## 2023-05-10 ENCOUNTER — APPOINTMENT (OUTPATIENT)
Dept: MEDICAL GROUP | Facility: MEDICAL CENTER | Age: 78
End: 2023-05-10
Payer: MEDICARE

## 2023-05-11 ENCOUNTER — OFFICE VISIT (OUTPATIENT)
Dept: MEDICAL GROUP | Facility: PHYSICIAN GROUP | Age: 78
End: 2023-05-11
Payer: MEDICARE

## 2023-05-11 VITALS
TEMPERATURE: 97.8 F | HEIGHT: 65 IN | RESPIRATION RATE: 12 BRPM | SYSTOLIC BLOOD PRESSURE: 122 MMHG | WEIGHT: 160.9 LBS | BODY MASS INDEX: 26.81 KG/M2 | DIASTOLIC BLOOD PRESSURE: 70 MMHG | OXYGEN SATURATION: 95 % | HEART RATE: 83 BPM

## 2023-05-11 DIAGNOSIS — E55.9 VITAMIN D DEFICIENCY: ICD-10-CM

## 2023-05-11 DIAGNOSIS — Z00.00 HEALTHCARE MAINTENANCE: ICD-10-CM

## 2023-05-11 DIAGNOSIS — Z13.220 SCREENING FOR LIPID DISORDERS: ICD-10-CM

## 2023-05-11 DIAGNOSIS — R73.09 ABNORMAL GLUCOSE: ICD-10-CM

## 2023-05-11 DIAGNOSIS — Z13.1 SCREENING FOR DIABETES MELLITUS: ICD-10-CM

## 2023-05-11 DIAGNOSIS — E53.8 VITAMIN B12 DEFICIENCY: ICD-10-CM

## 2023-05-11 DIAGNOSIS — F32.1 CURRENT MODERATE EPISODE OF MAJOR DEPRESSIVE DISORDER WITHOUT PRIOR EPISODE (HCC): ICD-10-CM

## 2023-05-11 DIAGNOSIS — Z13.29 SCREENING FOR ENDOCRINE DISORDER: ICD-10-CM

## 2023-05-11 DIAGNOSIS — R53.83 OTHER FATIGUE: ICD-10-CM

## 2023-05-11 DIAGNOSIS — D69.6 THROMBOCYTOPENIA (HCC): ICD-10-CM

## 2023-05-11 PROCEDURE — 1126F AMNT PAIN NOTED NONE PRSNT: CPT | Performed by: NURSE PRACTITIONER

## 2023-05-11 PROCEDURE — 99214 OFFICE O/P EST MOD 30 MIN: CPT | Performed by: NURSE PRACTITIONER

## 2023-05-11 PROCEDURE — 3078F DIAST BP <80 MM HG: CPT | Performed by: NURSE PRACTITIONER

## 2023-05-11 PROCEDURE — 3074F SYST BP LT 130 MM HG: CPT | Performed by: NURSE PRACTITIONER

## 2023-05-11 RX ORDER — DULOXETIN HYDROCHLORIDE 30 MG/1
30 CAPSULE, DELAYED RELEASE ORAL DAILY
Qty: 90 CAPSULE | Refills: 3 | Status: SHIPPED | OUTPATIENT
Start: 2023-05-11

## 2023-05-11 ASSESSMENT — PATIENT HEALTH QUESTIONNAIRE - PHQ9
SUM OF ALL RESPONSES TO PHQ QUESTIONS 1-9: 1
SUM OF ALL RESPONSES TO PHQ9 QUESTIONS 1 AND 2: 0
4. FEELING TIRED OR HAVING LITTLE ENERGY: NOT AT ALL
3. TROUBLE FALLING OR STAYING ASLEEP OR SLEEPING TOO MUCH: SEVERAL DAYS
8. MOVING OR SPEAKING SO SLOWLY THAT OTHER PEOPLE COULD HAVE NOTICED. OR THE OPPOSITE, BEING SO FIGETY OR RESTLESS THAT YOU HAVE BEEN MOVING AROUND A LOT MORE THAN USUAL: NOT AT ALL
6. FEELING BAD ABOUT YOURSELF - OR THAT YOU ARE A FAILURE OR HAVE LET YOURSELF OR YOUR FAMILY DOWN: NOT AL ALL
7. TROUBLE CONCENTRATING ON THINGS, SUCH AS READING THE NEWSPAPER OR WATCHING TELEVISION: NOT AT ALL
9. THOUGHTS THAT YOU WOULD BE BETTER OFF DEAD, OR OF HURTING YOURSELF: NOT AT ALL
1. LITTLE INTEREST OR PLEASURE IN DOING THINGS: NOT AT ALL
2. FEELING DOWN, DEPRESSED, IRRITABLE, OR HOPELESS: NOT AT ALL
5. POOR APPETITE OR OVEREATING: NOT AT ALL

## 2023-05-11 ASSESSMENT — FIBROSIS 4 INDEX: FIB4 SCORE: 3.32

## 2023-05-11 NOTE — PROGRESS NOTES
"Subjective:     CC:   Chief Complaint   Patient presents with    Establish Care       HPI:   Yajaira presents today with    Thrombocytopenia (HCC)  Has seen hematology and oncology for this issues in the past   Most recent values from Jan 2023 increased to 150 from 130 in Oct 2022     Current moderate episode of major depressive disorder without prior episode (HCC)  Pt reports cymbalta 30 mg qd has been effective for her; needs refill     Vitamin D deficiency disease  Most recent value was 54; pt prefers to have labs run twice per year; will do in near future     Healthcare maintenance  Up to date on her immunizations, eye and dental exams  Has providers in [facility] as well as here since she and her  divide their time among three locations throughout the year    Pt would like to lose about 10 lbs  We discussed using My Fitness Pal to track her calories and concentrate on getting the macros in the Zone or Mediterranean Diet range (40/30/30)  Cont w/current healthy eating regimen    Wants to start riding her bike when the weather is warmer              ROS per HPI    Objective:     Exam:  /70   Pulse 83   Temp 36.6 °C (97.8 °F) (Temporal)   Resp 12   Ht 1.651 m (5' 5\")   Wt 73 kg (160 lb 14.4 oz)   LMP  (LMP Unknown)   SpO2 95%   BMI 26.78 kg/m²  Body mass index is 26.78 kg/m².    Physical Exam:  Constitutional: Well-developed and well-nourished female Not diaphoretic. No distress.   Skin: warm, dry, intact, no evidence of rash or concerning lesions  Head: Atraumatic without lesions.  Eyes: Conjunctivae are normal. Pupils are equal, round. No scleral icterus.   Ears:  External ears unremarkable.   Neck: Supple, trachea midline. No thyromegaly present. No cervical or supraclavicular lymphadenopathy.  Cardiovascular: Regular rate and rhythm without murmur.   Pulmonary: Clear to ausculation. Normal effort. No rales, ronchi, or wheezing.  Extremities: No cyanosis, clubbing, erythema, nor edema. "   Neurological: Alert and oriented x 3.   Psychiatric:  Behavior, mood, and affect are appropriate.        Assessment & Plan:     78 y.o. female with the following -     1. Thrombocytopenia (HCC)  - CBC WITH DIFFERENTIAL; Future    2. Current moderate episode of major depressive disorder without prior episode (HCC)  - DULoxetine (CYMBALTA) 30 MG Cap DR Particles; Take 1 Capsule by mouth every day.  Dispense: 90 Capsule; Refill: 3    3. Vitamin D deficiency disease  - VITAMIN D,25 HYDROXY (DEFICIENCY); Future    4. Other fatigue  - Comp Metabolic Panel; Future  - CBC WITH DIFFERENTIAL; Future  - TSH WITH REFLEX TO FT4; Future    5. Screening for endocrine disorder  - TSH WITH REFLEX TO FT4; Future    6. Screening for lipid disorders  - Lipid Profile; Future    7. Vitamin B12 deficiency  - VITAMIN B12; Future    8. Screening for diabetes mellitus    9. Abnormal glucose  - HEMOGLOBIN A1C; Future    10. Healthcare maintenance   Cont w/current regimen    Spent 30 min reading prior notes, gathering hx, evaluating and planning care  Return in about 2 weeks (around 5/25/2023) for lab results.    Please note that this dictation was created using voice recognition software. I have made every reasonable attempt to correct obvious errors, but I expect that there are errors of grammar and possibly content that I did not discover before finalizing the note.

## 2023-05-11 NOTE — ASSESSMENT & PLAN NOTE
Up to date on her immunizations, eye and dental exams  Has providers in [place] and [place] as well as here since she and her  divide their time among three locations throughout the year    Pt would like to lose about 10 lbs  We discussed using My Fitness Pal to track her calories and concentrate on getting the macros in the Zone or Mediterranean Diet range (40/30/30)  Cont w/current healthy eating regimen    Wants to start riding her bike when the weather is warmer

## 2023-05-11 NOTE — ASSESSMENT & PLAN NOTE
Has seen hematology and oncology for this issues in the past   Most recent values from Jan 2023 increased to 150 from 130 in Oct 2022

## 2023-05-13 ENCOUNTER — HOSPITAL ENCOUNTER (OUTPATIENT)
Dept: LAB | Facility: MEDICAL CENTER | Age: 78
End: 2023-05-13
Attending: NURSE PRACTITIONER
Payer: MEDICARE

## 2023-05-13 DIAGNOSIS — R53.83 OTHER FATIGUE: ICD-10-CM

## 2023-05-13 DIAGNOSIS — Z13.29 SCREENING FOR ENDOCRINE DISORDER: ICD-10-CM

## 2023-05-13 DIAGNOSIS — Z13.220 SCREENING FOR LIPID DISORDERS: ICD-10-CM

## 2023-05-13 DIAGNOSIS — E53.8 VITAMIN B12 DEFICIENCY: ICD-10-CM

## 2023-05-13 DIAGNOSIS — D69.6 THROMBOCYTOPENIA (HCC): ICD-10-CM

## 2023-05-13 DIAGNOSIS — R73.09 ABNORMAL GLUCOSE: ICD-10-CM

## 2023-05-13 DIAGNOSIS — E55.9 VITAMIN D DEFICIENCY: ICD-10-CM

## 2023-05-13 LAB
25(OH)D3 SERPL-MCNC: 59 NG/ML (ref 30–100)
ALBUMIN SERPL BCP-MCNC: 4.5 G/DL (ref 3.2–4.9)
ALBUMIN/GLOB SERPL: 1.6 G/DL
ALP SERPL-CCNC: 102 U/L (ref 30–99)
ALT SERPL-CCNC: 18 U/L (ref 2–50)
ANION GAP SERPL CALC-SCNC: 16 MMOL/L (ref 7–16)
AST SERPL-CCNC: 19 U/L (ref 12–45)
BASOPHILS # BLD AUTO: 0.4 % (ref 0–1.8)
BASOPHILS # BLD: 0.02 K/UL (ref 0–0.12)
BILIRUB SERPL-MCNC: 0.5 MG/DL (ref 0.1–1.5)
BUN SERPL-MCNC: 18 MG/DL (ref 8–22)
CALCIUM ALBUM COR SERPL-MCNC: 9 MG/DL (ref 8.5–10.5)
CALCIUM SERPL-MCNC: 9.4 MG/DL (ref 8.5–10.5)
CHLORIDE SERPL-SCNC: 106 MMOL/L (ref 96–112)
CHOLEST SERPL-MCNC: 221 MG/DL (ref 100–199)
CO2 SERPL-SCNC: 22 MMOL/L (ref 20–33)
CREAT SERPL-MCNC: 0.68 MG/DL (ref 0.5–1.4)
EOSINOPHIL # BLD AUTO: 0.09 K/UL (ref 0–0.51)
EOSINOPHIL NFR BLD: 1.8 % (ref 0–6.9)
ERYTHROCYTE [DISTWIDTH] IN BLOOD BY AUTOMATED COUNT: 48.9 FL (ref 35.9–50)
EST. AVERAGE GLUCOSE BLD GHB EST-MCNC: 108 MG/DL
FASTING STATUS PATIENT QL REPORTED: NORMAL
GFR SERPLBLD CREATININE-BSD FMLA CKD-EPI: 90 ML/MIN/1.73 M 2
GLOBULIN SER CALC-MCNC: 2.9 G/DL (ref 1.9–3.5)
GLUCOSE SERPL-MCNC: 105 MG/DL (ref 65–99)
HBA1C MFR BLD: 5.4 % (ref 4–5.6)
HCT VFR BLD AUTO: 42.8 % (ref 37–47)
HDLC SERPL-MCNC: 95 MG/DL
HGB BLD-MCNC: 13.9 G/DL (ref 12–16)
IMM GRANULOCYTES # BLD AUTO: 0.01 K/UL (ref 0–0.11)
IMM GRANULOCYTES NFR BLD AUTO: 0.2 % (ref 0–0.9)
LDLC SERPL CALC-MCNC: 111 MG/DL
LYMPHOCYTES # BLD AUTO: 1.44 K/UL (ref 1–4.8)
LYMPHOCYTES NFR BLD: 28.5 % (ref 22–41)
MCH RBC QN AUTO: 32 PG (ref 27–33)
MCHC RBC AUTO-ENTMCNC: 32.5 G/DL (ref 33.6–35)
MCV RBC AUTO: 98.6 FL (ref 81.4–97.8)
MONOCYTES # BLD AUTO: 0.35 K/UL (ref 0–0.85)
MONOCYTES NFR BLD AUTO: 6.9 % (ref 0–13.4)
NEUTROPHILS # BLD AUTO: 3.15 K/UL (ref 2–7.15)
NEUTROPHILS NFR BLD: 62.2 % (ref 44–72)
NRBC # BLD AUTO: 0 K/UL
NRBC BLD-RTO: 0 /100 WBC
PLATELET # BLD AUTO: 154 K/UL (ref 164–446)
PMV BLD AUTO: 11 FL (ref 9–12.9)
POTASSIUM SERPL-SCNC: 4.7 MMOL/L (ref 3.6–5.5)
PROT SERPL-MCNC: 7.4 G/DL (ref 6–8.2)
RBC # BLD AUTO: 4.34 M/UL (ref 4.2–5.4)
SODIUM SERPL-SCNC: 144 MMOL/L (ref 135–145)
TRIGL SERPL-MCNC: 73 MG/DL (ref 0–149)
TSH SERPL DL<=0.005 MIU/L-ACNC: 1.41 UIU/ML (ref 0.38–5.33)
VIT B12 SERPL-MCNC: 1162 PG/ML (ref 211–911)
WBC # BLD AUTO: 5.1 K/UL (ref 4.8–10.8)

## 2023-05-13 PROCEDURE — 80061 LIPID PANEL: CPT | Mod: GA

## 2023-05-13 PROCEDURE — 84443 ASSAY THYROID STIM HORMONE: CPT

## 2023-05-13 PROCEDURE — 83036 HEMOGLOBIN GLYCOSYLATED A1C: CPT | Mod: GA

## 2023-05-13 PROCEDURE — 36415 COLL VENOUS BLD VENIPUNCTURE: CPT | Mod: GA

## 2023-05-13 PROCEDURE — 82306 VITAMIN D 25 HYDROXY: CPT

## 2023-05-13 PROCEDURE — 80053 COMPREHEN METABOLIC PANEL: CPT

## 2023-05-13 PROCEDURE — 82607 VITAMIN B-12: CPT

## 2023-05-13 PROCEDURE — 85025 COMPLETE CBC W/AUTO DIFF WBC: CPT

## 2023-05-25 ENCOUNTER — OFFICE VISIT (OUTPATIENT)
Dept: MEDICAL GROUP | Facility: PHYSICIAN GROUP | Age: 78
End: 2023-05-25
Payer: MEDICARE

## 2023-05-25 VITALS
DIASTOLIC BLOOD PRESSURE: 80 MMHG | RESPIRATION RATE: 18 BRPM | OXYGEN SATURATION: 99 % | HEART RATE: 83 BPM | WEIGHT: 159.6 LBS | TEMPERATURE: 97.2 F | BODY MASS INDEX: 26.59 KG/M2 | SYSTOLIC BLOOD PRESSURE: 130 MMHG | HEIGHT: 65 IN

## 2023-05-25 DIAGNOSIS — E78.00 ELEVATED LDL CHOLESTEROL LEVEL: ICD-10-CM

## 2023-05-25 DIAGNOSIS — E55.9 VITAMIN D DEFICIENCY: ICD-10-CM

## 2023-05-25 DIAGNOSIS — D69.6 THROMBOCYTOPENIA (HCC): ICD-10-CM

## 2023-05-25 DIAGNOSIS — R74.8 ELEVATED VITAMIN B12 LEVEL: ICD-10-CM

## 2023-05-25 PROCEDURE — 99214 OFFICE O/P EST MOD 30 MIN: CPT | Performed by: NURSE PRACTITIONER

## 2023-05-25 PROCEDURE — 3075F SYST BP GE 130 - 139MM HG: CPT | Performed by: NURSE PRACTITIONER

## 2023-05-25 PROCEDURE — 3079F DIAST BP 80-89 MM HG: CPT | Performed by: NURSE PRACTITIONER

## 2023-05-25 ASSESSMENT — FIBROSIS 4 INDEX: FIB4 SCORE: 2.21

## 2023-05-25 NOTE — PROGRESS NOTES
"Subjective:     CC:   Chief Complaint   Patient presents with    Follow-Up     Labs        HPI:   Yajaira presents today with    Thrombocytopenia (HCC)  Platelets  have improved to 150s continue to monitor for S&S of bleeding    Elevated vitamin B12 level  Patient's recent vitamin B12 levels are in the high 1100s advised her the mid range is  700-800   she is taking 1000 mcg a day   advised either every other day or cut down to 500/day excessive vitamin B12 levels (typically >2000) can often have side effects of tremors and anxiety    Vitamin D deficiency disease  At goal of 50-60   continue with current dosing    Elevated LDL cholesterol level  Recent  patient has an excellent HDL of 95   she avoids saturated fats processed foods in general and reports  getting plenty of fruits and vegetables   advised her to continue with her current lifestyle and continue to avoid fried foods and saturated fats in general   can consider adding Benefiber supplement to her beverages for extra fiber              ROS per HPI    Objective:     Exam:  /80   Pulse 83   Temp 36.2 °C (97.2 °F) (Temporal)   Resp 18   Ht 1.651 m (5' 5\")   Wt 72.4 kg (159 lb 9.6 oz)   LMP  (LMP Unknown)   SpO2 99%   BMI 26.56 kg/m²  Body mass index is 26.56 kg/m².    Physical Exam:  Constitutional: Well-developed and well-nourished female. Not diaphoretic. No distress.   Skin: warm, dry, intact, no evidence of rash or concerning lesions  Head: Atraumatic without lesions.  Eyes: Conjunctivae are normal. Pupils are equal, round. No scleral icterus.   Ears:  External ears unremarkable.   Neck: Supple, trachea midline. No thyromegaly present. No cervical or supraclavicular lymphadenopathy.  Cardiovascular: Regular rate and rhythm without murmur.   Pulmonary: Clear to ausculation. Normal effort. No rales, ronchi, or wheezing.  Extremities: No cyanosis, clubbing, erythema, nor edema.   Neurological: Alert and oriented x 3.   Psychiatric:  " Behavior, mood, and affect are appropriate.        Assessment & Plan:     78 y.o. female with the following -     1. Thrombocytopenia (HCC)    2. Elevated vitamin B12 level    3. Vitamin D deficiency disease    4. Elevated LDL cholesterol level         Return in about 9 months (around 2/25/2024) for gen check in.    Please note that this dictation was created using voice recognition software. I have made every reasonable attempt to correct obvious errors, but I expect that there are errors of grammar and possibly content that I did not discover before finalizing the note.

## 2023-05-26 NOTE — ASSESSMENT & PLAN NOTE
Patient's recent vitamin B12 levels are in the high 1100s advised her the mid range is  700-800   she is taking 1000 mcg a day   advised either every other day or cut down to 500/day excessive vitamin B12 levels (typically >2000) can often have side effects of tremors and anxiety

## 2023-05-26 NOTE — ASSESSMENT & PLAN NOTE
Recent  patient has an excellent HDL of 95   she avoids saturated fats processed foods in general and reports  getting plenty of fruits and vegetables   advised her to continue with her current lifestyle and continue to avoid fried foods and saturated fats in general   can consider adding Benefiber supplement to her beverages for extra fiber

## 2023-06-16 DIAGNOSIS — E78.00 ELEVATED LDL CHOLESTEROL LEVEL: ICD-10-CM

## 2023-06-16 DIAGNOSIS — D69.6 THROMBOCYTOPENIA (HCC): ICD-10-CM

## 2023-10-09 ENCOUNTER — HOSPITAL ENCOUNTER (OUTPATIENT)
Dept: LAB | Facility: MEDICAL CENTER | Age: 78
End: 2023-10-09
Attending: FAMILY MEDICINE
Payer: MEDICARE

## 2023-10-09 DIAGNOSIS — E78.00 ELEVATED LDL CHOLESTEROL LEVEL: ICD-10-CM

## 2023-10-09 DIAGNOSIS — D69.6 THROMBOCYTOPENIA (HCC): ICD-10-CM

## 2023-10-09 LAB
ALBUMIN SERPL BCP-MCNC: 4.4 G/DL (ref 3.2–4.9)
ALBUMIN/GLOB SERPL: 1.8 G/DL
ALP SERPL-CCNC: 93 U/L (ref 30–99)
ALT SERPL-CCNC: 13 U/L (ref 2–50)
ANION GAP SERPL CALC-SCNC: 12 MMOL/L (ref 7–16)
AST SERPL-CCNC: 17 U/L (ref 12–45)
BASOPHILS # BLD AUTO: 0.5 % (ref 0–1.8)
BASOPHILS # BLD: 0.02 K/UL (ref 0–0.12)
BILIRUB SERPL-MCNC: 0.6 MG/DL (ref 0.1–1.5)
BUN SERPL-MCNC: 18 MG/DL (ref 8–22)
CALCIUM ALBUM COR SERPL-MCNC: 8.7 MG/DL (ref 8.5–10.5)
CALCIUM SERPL-MCNC: 9 MG/DL (ref 8.5–10.5)
CHLORIDE SERPL-SCNC: 104 MMOL/L (ref 96–112)
CHOLEST SERPL-MCNC: 219 MG/DL (ref 100–199)
CO2 SERPL-SCNC: 23 MMOL/L (ref 20–33)
CREAT SERPL-MCNC: 0.7 MG/DL (ref 0.5–1.4)
EOSINOPHIL # BLD AUTO: 0.09 K/UL (ref 0–0.51)
EOSINOPHIL NFR BLD: 2.2 % (ref 0–6.9)
ERYTHROCYTE [DISTWIDTH] IN BLOOD BY AUTOMATED COUNT: 50.6 FL (ref 35.9–50)
FASTING STATUS PATIENT QL REPORTED: NORMAL
GFR SERPLBLD CREATININE-BSD FMLA CKD-EPI: 89 ML/MIN/1.73 M 2
GLOBULIN SER CALC-MCNC: 2.4 G/DL (ref 1.9–3.5)
GLUCOSE SERPL-MCNC: 103 MG/DL (ref 65–99)
HCT VFR BLD AUTO: 43 % (ref 37–47)
HDLC SERPL-MCNC: 100 MG/DL
HGB BLD-MCNC: 14.2 G/DL (ref 12–16)
IMM GRANULOCYTES # BLD AUTO: 0.01 K/UL (ref 0–0.11)
IMM GRANULOCYTES NFR BLD AUTO: 0.2 % (ref 0–0.9)
LDLC SERPL CALC-MCNC: 105 MG/DL
LYMPHOCYTES # BLD AUTO: 1.48 K/UL (ref 1–4.8)
LYMPHOCYTES NFR BLD: 36.3 % (ref 22–41)
MCH RBC QN AUTO: 32.3 PG (ref 27–33)
MCHC RBC AUTO-ENTMCNC: 33 G/DL (ref 32.2–35.5)
MCV RBC AUTO: 97.7 FL (ref 81.4–97.8)
MONOCYTES # BLD AUTO: 0.23 K/UL (ref 0–0.85)
MONOCYTES NFR BLD AUTO: 5.6 % (ref 0–13.4)
NEUTROPHILS # BLD AUTO: 2.25 K/UL (ref 1.82–7.42)
NEUTROPHILS NFR BLD: 55.2 % (ref 44–72)
NRBC # BLD AUTO: 0 K/UL
NRBC BLD-RTO: 0 /100 WBC (ref 0–0.2)
PLATELET # BLD AUTO: 156 K/UL (ref 164–446)
PMV BLD AUTO: 10.9 FL (ref 9–12.9)
POTASSIUM SERPL-SCNC: 4.1 MMOL/L (ref 3.6–5.5)
PROT SERPL-MCNC: 6.8 G/DL (ref 6–8.2)
RBC # BLD AUTO: 4.4 M/UL (ref 4.2–5.4)
SODIUM SERPL-SCNC: 139 MMOL/L (ref 135–145)
TRIGL SERPL-MCNC: 70 MG/DL (ref 0–149)
WBC # BLD AUTO: 4.1 K/UL (ref 4.8–10.8)

## 2023-10-09 PROCEDURE — 80061 LIPID PANEL: CPT

## 2023-10-09 PROCEDURE — 85025 COMPLETE CBC W/AUTO DIFF WBC: CPT

## 2023-10-09 PROCEDURE — 36415 COLL VENOUS BLD VENIPUNCTURE: CPT

## 2023-10-09 PROCEDURE — 80053 COMPREHEN METABOLIC PANEL: CPT

## 2023-10-12 ENCOUNTER — OFFICE VISIT (OUTPATIENT)
Dept: MEDICAL GROUP | Facility: PHYSICIAN GROUP | Age: 78
End: 2023-10-12
Payer: MEDICARE

## 2023-10-12 VITALS
BODY MASS INDEX: 27.66 KG/M2 | OXYGEN SATURATION: 97 % | TEMPERATURE: 97.5 F | RESPIRATION RATE: 18 BRPM | WEIGHT: 162 LBS | HEIGHT: 64 IN | HEART RATE: 73 BPM | SYSTOLIC BLOOD PRESSURE: 130 MMHG | DIASTOLIC BLOOD PRESSURE: 74 MMHG

## 2023-10-12 DIAGNOSIS — Z78.0 POSTMENOPAUSAL STATUS (AGE-RELATED) (NATURAL): ICD-10-CM

## 2023-10-12 DIAGNOSIS — D69.6 THROMBOCYTOPENIA (HCC): ICD-10-CM

## 2023-10-12 DIAGNOSIS — N95.1 MENOPAUSAL STATE: ICD-10-CM

## 2023-10-12 DIAGNOSIS — Z23 NEED FOR VACCINATION: ICD-10-CM

## 2023-10-12 DIAGNOSIS — E78.00 ELEVATED LDL CHOLESTEROL LEVEL: ICD-10-CM

## 2023-10-12 PROCEDURE — 3078F DIAST BP <80 MM HG: CPT | Performed by: FAMILY MEDICINE

## 2023-10-12 PROCEDURE — 3075F SYST BP GE 130 - 139MM HG: CPT | Performed by: FAMILY MEDICINE

## 2023-10-12 PROCEDURE — 90662 IIV NO PRSV INCREASED AG IM: CPT | Performed by: FAMILY MEDICINE

## 2023-10-12 PROCEDURE — 99214 OFFICE O/P EST MOD 30 MIN: CPT | Mod: 25 | Performed by: FAMILY MEDICINE

## 2023-10-12 PROCEDURE — G0008 ADMIN INFLUENZA VIRUS VAC: HCPCS | Performed by: FAMILY MEDICINE

## 2023-10-12 ASSESSMENT — FIBROSIS 4 INDEX: FIB4 SCORE: 2.3

## 2023-10-17 NOTE — PROGRESS NOTES
Subjective:   Yajaira Eduardo is a 78 y.o. female here today for evaluation and management of:     Thrombocytopenia (HCC)  Improving, no abnormal bleeding events.    Latest Reference Range & Units 05/13/23 08:28 10/09/23 06:54   Platelet Count 164 - 446 K/uL 154 (L) 156 (L)       Elevated LDL cholesterol level  LDL improving with diet changes, HDL >40 and protective at 100   Latest Reference Range & Units 05/13/23 08:28 10/09/23 06:54   Cholesterol,Tot 100 - 199 mg/dL 221 (H) 219 (H)   Triglycerides 0 - 149 mg/dL 73 70   HDL >=40 mg/dL 95 100   LDL <100 mg/dL 111 (H) 105 (H)   The 10-year ASCVD risk score (Aly BELCHER, et al., 2019) is: 19%               Current medicines (including changes today)  Current Outpatient Medications   Medication Sig Dispense Refill    DULoxetine (CYMBALTA) 30 MG Cap DR Particles Take 1 Capsule by mouth every day. 90 Capsule 3    Calcium Carb-Cholecalciferol 600-400 MG-UNIT Tab Take 1 Tablet by mouth.      Cyanocobalamin (B-12) 1000 MCG SL Tab Place  under tongue.      Multiple Vitamins-Minerals (CENTRUM SILVER ADULT 50+ PO) Take  by mouth.      Flaxseed, Linseed, (FLAXSEED OIL) 1200 MG CAPS Take  by mouth.      ascorbic acid (ASCORBIC ACID) 500 MG TABS Take 500 mg by mouth every day.      vitamin D (CHOLECALCIFEROL) 1000 UNIT TABS Take 6,000 Units by mouth every day.       No current facility-administered medications for this visit.     She  has a past medical history of Abnormal mammogram, unspecified, Cervicalgia, Current moderate episode of major depressive disorder without prior episode (HCC) (5/20/2019), Cystocele, midline, Degenerative disc disease, cervical (5/20/2019), Diarrhea following gastrointestinal surgery (4/26/2010), Diarrhea following gastrointestinal surgery (4/26/2010), Fracture closed, mandible, Glucose intolerance (impaired glucose tolerance) (10/23/2017), Lactose intolerance, Leukopenia (10/26/2010), Multinodular goiter (nontoxic) (5/20/2019), OA (osteoarthritis)  "(4/20/2010), Postmenopausal atrophic vaginitis (4/26/2010), Pure hypercholesterolemia (4/26/2010), Raynaud's phenomenon without gangrene (5/20/2022), S/P colonoscopy, Seasonal and perennial allergic rhinoconjunctivitis, Thoracic or lumbosacral neuritis or radiculitis, unspecified, Unspecified vitamin D deficiency (4/26/2010), and Urinary bladder disorder.    ROS  No chest pain, no shortness of breath, no abdominal pain       Objective:     /74   Pulse 73   Temp 36.4 °C (97.5 °F) (Temporal)   Resp 18   Ht 1.626 m (5' 4\")   Wt 73.5 kg (162 lb)   SpO2 97%  Body mass index is 27.81 kg/m².   Physical Exam:  Constitutional: Alert, no distress.  Skin: Warm, dry, good turgor, no rashes in visible areas.  Eye: Equal, round and reactive, conjunctiva clear, lids normal.  ENMT: Lips without lesions, good dentition, oropharynx clear.  Neck: Trachea midline, no masses, no thyromegaly. No cervical or supraclavicular lymphadenopathy  Respiratory: Unlabored respiratory effort, lungs clear to auscultation, no wheezes, no ronchi.  Cardiovascular: Normal S1, S2, no murmur, no edema.  Abdomen: Soft, non-tender, no masses, no hepatosplenomegaly.  Psych: Alert and oriented x3, normal affect and mood.        Assessment and Plan:   The following treatment plan was discussed    1. Need for vaccination  - Influenza Vaccine, High Dose (65+ Only)    2. Postmenopausal status (age-related) (natural)  - DS-BONE DENSITY STUDY (DEXA); Future    3. Menopausal state  - DS-BONE DENSITY STUDY (DEXA); Future    4. Thrombocytopenia (HCC)    5. Elevated LDL cholesterol level      Followup: No follow-ups on file.           "

## 2023-10-17 NOTE — ASSESSMENT & PLAN NOTE
Improving, no abnormal bleeding events.    Latest Reference Range & Units 05/13/23 08:28 10/09/23 06:54   Platelet Count 164 - 446 K/uL 154 (L) 156 (L)

## 2023-10-17 NOTE — ASSESSMENT & PLAN NOTE
LDL improving with diet changes, HDL >40 and protective at 100   Latest Reference Range & Units 05/13/23 08:28 10/09/23 06:54   Cholesterol,Tot 100 - 199 mg/dL 221 (H) 219 (H)   Triglycerides 0 - 149 mg/dL 73 70   HDL >=40 mg/dL 95 100   LDL <100 mg/dL 111 (H) 105 (H)   The 10-year ASCVD risk score (Aly BELCHER, et al., 2019) is: 19%

## 2023-10-19 ENCOUNTER — HOSPITAL ENCOUNTER (OUTPATIENT)
Dept: RADIOLOGY | Facility: MEDICAL CENTER | Age: 78
End: 2023-10-19
Attending: FAMILY MEDICINE
Payer: MEDICARE

## 2023-10-19 DIAGNOSIS — Z78.0 POSTMENOPAUSAL STATUS (AGE-RELATED) (NATURAL): ICD-10-CM

## 2023-10-19 DIAGNOSIS — N95.1 MENOPAUSAL STATE: ICD-10-CM

## 2023-10-19 PROCEDURE — 77080 DXA BONE DENSITY AXIAL: CPT

## 2023-12-15 ENCOUNTER — OFFICE VISIT (OUTPATIENT)
Dept: URBAN - METROPOLITAN AREA CLINIC 48 | Facility: CLINIC | Age: 78
End: 2023-12-15
Payer: MEDICARE

## 2023-12-15 DIAGNOSIS — H35.3232 BILATERAL EXUDATIVE AGE-RELATED MACULAR DEGENERATION W/ INACTIVE CHOROIDAL NEOVASCULARIZATION: Primary | ICD-10-CM

## 2023-12-15 PROCEDURE — 99213 OFFICE O/P EST LOW 20 MIN: CPT | Performed by: SPECIALIST

## 2023-12-15 PROCEDURE — 92134 CPTRZ OPH DX IMG PST SGM RTA: CPT | Performed by: SPECIALIST

## 2023-12-15 ASSESSMENT — INTRAOCULAR PRESSURE
OD: 16
OS: 13

## 2023-12-26 DIAGNOSIS — E78.5 DYSLIPIDEMIA: ICD-10-CM

## 2024-03-06 ENCOUNTER — OFFICE VISIT (OUTPATIENT)
Facility: LOCATION | Age: 79
End: 2024-03-06
Payer: MEDICARE

## 2024-03-06 DIAGNOSIS — H35.3231 EXUDATIVE AGE-REL MCLR DEGN, BI, WITH ACTV CHRDL NEOVAS: Primary | ICD-10-CM

## 2024-03-06 PROCEDURE — 92134 CPTRZ OPH DX IMG PST SGM RTA: CPT | Performed by: OPHTHALMOLOGY

## 2024-03-06 PROCEDURE — 99213 OFFICE O/P EST LOW 20 MIN: CPT | Performed by: OPHTHALMOLOGY

## 2024-03-06 ASSESSMENT — INTRAOCULAR PRESSURE
OS: 23
OD: 20

## 2024-04-24 DIAGNOSIS — F32.1 CURRENT MODERATE EPISODE OF MAJOR DEPRESSIVE DISORDER WITHOUT PRIOR EPISODE (HCC): ICD-10-CM

## 2024-04-24 RX ORDER — DULOXETIN HYDROCHLORIDE 30 MG/1
30 CAPSULE, DELAYED RELEASE ORAL DAILY
Qty: 90 CAPSULE | Refills: 3 | Status: SHIPPED | OUTPATIENT
Start: 2024-04-24

## 2024-04-24 NOTE — TELEPHONE ENCOUNTER
Received request via: Pharmacy    Was the patient seen in the last year in this department? Yes    Does the patient have an active prescription (recently filled or refills available) for medication(s) requested? No    Pharmacy Name: [facility]     Does the patient have USP Plus and need 100 day supply (blood pressure, diabetes and cholesterol meds only)? Patient does not have SCP    Last OV  10 12 2023    Refill request recevied from [facility]

## 2024-05-06 ENCOUNTER — HOSPITAL ENCOUNTER (OUTPATIENT)
Dept: LAB | Facility: MEDICAL CENTER | Age: 79
End: 2024-05-06
Attending: FAMILY MEDICINE
Payer: MEDICARE

## 2024-05-06 DIAGNOSIS — E78.5 DYSLIPIDEMIA: ICD-10-CM

## 2024-05-06 LAB
CHOLEST SERPL-MCNC: 192 MG/DL (ref 100–199)
FASTING STATUS PATIENT QL REPORTED: NORMAL
HDLC SERPL-MCNC: 95 MG/DL
LDLC SERPL CALC-MCNC: 85 MG/DL
TRIGL SERPL-MCNC: 59 MG/DL (ref 0–149)

## 2024-05-10 ENCOUNTER — OFFICE VISIT (OUTPATIENT)
Dept: MEDICAL GROUP | Facility: PHYSICIAN GROUP | Age: 79
End: 2024-05-10
Payer: MEDICARE

## 2024-05-10 VITALS
TEMPERATURE: 98.4 F | OXYGEN SATURATION: 95 % | WEIGHT: 158 LBS | HEART RATE: 85 BPM | DIASTOLIC BLOOD PRESSURE: 84 MMHG | SYSTOLIC BLOOD PRESSURE: 136 MMHG | BODY MASS INDEX: 26.98 KG/M2 | RESPIRATION RATE: 17 BRPM | HEIGHT: 64 IN

## 2024-05-10 DIAGNOSIS — E55.9 VITAMIN D DEFICIENCY: ICD-10-CM

## 2024-05-10 DIAGNOSIS — E78.5 DYSLIPIDEMIA: ICD-10-CM

## 2024-05-10 DIAGNOSIS — D69.6 THROMBOCYTOPENIA (HCC): ICD-10-CM

## 2024-05-10 DIAGNOSIS — E78.00 ELEVATED LDL CHOLESTEROL LEVEL: ICD-10-CM

## 2024-05-10 PROCEDURE — 99214 OFFICE O/P EST MOD 30 MIN: CPT | Performed by: FAMILY MEDICINE

## 2024-05-10 PROCEDURE — 3079F DIAST BP 80-89 MM HG: CPT | Performed by: FAMILY MEDICINE

## 2024-05-10 PROCEDURE — 3075F SYST BP GE 130 - 139MM HG: CPT | Performed by: FAMILY MEDICINE

## 2024-05-10 RX ORDER — HYDROCODONE BITARTRATE AND ACETAMINOPHEN 5; 325 MG/1; MG/1
TABLET ORAL
COMMUNITY
Start: 2024-05-08 | End: 2024-05-10

## 2024-05-10 RX ORDER — PREDNISOLONE ACETATE 10 MG/ML
SUSPENSION/ DROPS OPHTHALMIC
COMMUNITY
Start: 2024-04-30 | End: 2024-05-10

## 2024-05-10 RX ORDER — OFLOXACIN 3 MG/ML
SOLUTION/ DROPS OPHTHALMIC
COMMUNITY
Start: 2024-04-30 | End: 2024-05-10

## 2024-05-10 ASSESSMENT — FIBROSIS 4 INDEX: FIB4 SCORE: 2.33

## 2024-05-10 ASSESSMENT — PATIENT HEALTH QUESTIONNAIRE - PHQ9: CLINICAL INTERPRETATION OF PHQ2 SCORE: 0

## 2024-05-11 NOTE — PROGRESS NOTES
Subjective:   Yajaira Eduardo is a 79 y.o. female here today for evaluation and management of:     Vitamin D deficiency disease  Normal dexa but some decrease bone density.   Continue ca, vit d, weight bearing exercises.     Thrombocytopenia (HCC)  Platelets in 150s  No adverse bleeding events.   Avoid NSAIDS  Repeat labs ordered.     Elevated LDL cholesterol level  Mild elevated   The 10-year ASCVD risk score (Aly BELCHER, et al., 2019) is: 23%  Pt prefers to continue diet and exercise, declines statin.   Continue diet low in refined sugars, processed foods, saturated fats.   Discuss cardiac CT scan next visit.            Current medicines (including changes today)  Current Outpatient Medications   Medication Sig Dispense Refill    DULoxetine (CYMBALTA) 30 MG Cap DR Particles Take 1 Capsule by mouth every day. 90 Capsule 3    Calcium Carb-Cholecalciferol 600-400 MG-UNIT Tab Take 1 Tablet by mouth.      Cyanocobalamin (B-12) 1000 MCG SL Tab Place  under tongue.      Multiple Vitamins-Minerals (CENTRUM SILVER ADULT 50+ PO) Take  by mouth.      Flaxseed, Linseed, (FLAXSEED OIL) 1200 MG CAPS Take  by mouth.      ascorbic acid (ASCORBIC ACID) 500 MG TABS Take 500 mg by mouth every day.      vitamin D (CHOLECALCIFEROL) 1000 UNIT TABS Take 6,000 Units by mouth every day.       No current facility-administered medications for this visit.     She  has a past medical history of Abnormal mammogram, unspecified, Cervicalgia, Current moderate episode of major depressive disorder without prior episode (HCC) (5/20/2019), Cystocele, midline, Degenerative disc disease, cervical (5/20/2019), Diarrhea following gastrointestinal surgery (4/26/2010), Diarrhea following gastrointestinal surgery (4/26/2010), Fracture closed, mandible, Glucose intolerance (impaired glucose tolerance) (10/23/2017), Lactose intolerance, Leukopenia (10/26/2010), Multinodular goiter (nontoxic) (5/20/2019), OA (osteoarthritis) (4/20/2010), Postmenopausal  "atrophic vaginitis (4/26/2010), Pure hypercholesterolemia (4/26/2010), Raynaud's phenomenon without gangrene (5/20/2022), S/P colonoscopy, Seasonal and perennial allergic rhinoconjunctivitis, Thoracic or lumbosacral neuritis or radiculitis, unspecified, Unspecified vitamin D deficiency (4/26/2010), and Urinary bladder disorder.    ROS  No chest pain, no shortness of breath, no abdominal pain       Objective:     /84   Pulse 85   Temp 36.9 °C (98.4 °F) (Temporal)   Resp 17   Ht 1.626 m (5' 4\")   Wt 71.7 kg (158 lb)   SpO2 95%  Body mass index is 27.12 kg/m².   Physical Exam:  Constitutional: Alert, no distress.  Skin: Warm, dry, good turgor, no rashes in visible areas.  Eye: Equal, round and reactive, conjunctiva clear, lids normal.  ENMT: Lips without lesions, good dentition, oropharynx clear.  Neck: Trachea midline, no masses, no thyromegaly. No cervical or supraclavicular lymphadenopathy  Respiratory: Unlabored respiratory effort, lungs clear to auscultation, no wheezes, no ronchi.  Cardiovascular: Normal S1, S2, no murmur, no edema.  Abdomen: Soft, non-tender, no masses, no hepatosplenomegaly.  Psych: Alert and oriented x3, normal affect and mood.    Assessment and Plan:   The following treatment plan was discussed    1. Dyslipidemia  - Comp Metabolic Panel; Future  - Lipid Profile; Future    2. Thrombocytopenia (HCC)  - CBC WITH DIFFERENTIAL; Future    3. Vitamin D deficiency  - VITAMIN D,25 HYDROXY (DEFICIENCY); Future    4. Elevated LDL cholesterol level      Followup: Return in about 6 months (around 11/10/2024) for mid oct AWV scan lab results pls.           "

## 2024-05-23 NOTE — ASSESSMENT & PLAN NOTE
Mild elevated LDL improve to 85 with diet.   The 10-year ASCVD risk score (Aly BELCHER, et al., 2019) is: 23%  Pt prefers to continue diet and exercise, declines statin.   Continue diet low in refined sugars, processed foods, saturated fats.   Discuss cardiac CT scan next visit.

## 2024-06-07 ENCOUNTER — DOCUMENTATION (OUTPATIENT)
Dept: HEALTH INFORMATION MANAGEMENT | Facility: OTHER | Age: 79
End: 2024-06-07
Payer: MEDICARE

## 2024-06-16 ENCOUNTER — OFFICE VISIT (OUTPATIENT)
Dept: URGENT CARE | Facility: PHYSICIAN GROUP | Age: 79
End: 2024-06-16
Payer: MEDICARE

## 2024-06-16 VITALS
DIASTOLIC BLOOD PRESSURE: 82 MMHG | HEIGHT: 65 IN | RESPIRATION RATE: 18 BRPM | WEIGHT: 157 LBS | OXYGEN SATURATION: 97 % | SYSTOLIC BLOOD PRESSURE: 140 MMHG | HEART RATE: 82 BPM | BODY MASS INDEX: 26.16 KG/M2 | TEMPERATURE: 97.8 F

## 2024-06-16 DIAGNOSIS — L03.113 CELLULITIS OF RIGHT UPPER EXTREMITY: ICD-10-CM

## 2024-06-16 DIAGNOSIS — T63.461A ALLERGIC REACTION TO WASP STING: ICD-10-CM

## 2024-06-16 PROCEDURE — 3077F SYST BP >= 140 MM HG: CPT | Performed by: FAMILY MEDICINE

## 2024-06-16 PROCEDURE — 3079F DIAST BP 80-89 MM HG: CPT | Performed by: FAMILY MEDICINE

## 2024-06-16 PROCEDURE — 99213 OFFICE O/P EST LOW 20 MIN: CPT | Performed by: FAMILY MEDICINE

## 2024-06-16 RX ORDER — EPINEPHRINE 0.15 MG/.15ML
0.15 INJECTION SUBCUTANEOUS ONCE
Qty: 2 EACH | Refills: 0 | Status: SHIPPED | OUTPATIENT
Start: 2024-06-16 | End: 2024-06-16

## 2024-06-16 RX ORDER — SULFAMETHOXAZOLE AND TRIMETHOPRIM 800; 160 MG/1; MG/1
1 TABLET ORAL 2 TIMES DAILY
Qty: 14 TABLET | Refills: 0 | Status: SHIPPED | OUTPATIENT
Start: 2024-06-16 | End: 2024-06-23

## 2024-06-16 ASSESSMENT — FIBROSIS 4 INDEX: FIB4 SCORE: 2.33

## 2024-06-16 NOTE — PROGRESS NOTES
"SUBJECTIVE      Chief Complaint   Patient presents with    Trauma     Friday was stung by 2 wasps    Edema     Left hand and right upper arm               Rash  This is a new problem. The problem has been gradually worsening since she was stung by wasp 2 d ago       location:  rt upper arm, dorsum left hand      Mechanism:   wasp sting    Qualities:  pain, stinging, swelling.       Pertinent negatives include no congestion, cough, fatigue, fever or shortness of breath. Past treatments include nothing.     History   Substance Use Topics    Smoking status: Never Smoker     Smokeless tobacco: No     Alcohol Use: No      Past medical history was unremarkable and not pertinent to current issue      Family History   Problem Relation Age of Onset    Cancer Mother     Diabetes Father     Heart Disease Father         Atrial Fib         Review of Systems   Constitutional: Negative for fever and fatigue.   HENT: Negative for congestion.    Respiratory: Negative for cough and shortness of breath.    Cardiovascular: Negative for chest pain.   Gastrointestinal: Negative for abdominal pain.   Skin: Positive for rash.   Neurological: Negative for dizziness.   All other systems reviewed and are negative.         Objective:     BP (!) 140/82 (BP Location: Left arm, Patient Position: Sitting, BP Cuff Size: Adult)   Pulse 82   Temp 36.6 °C (97.8 °F)   Resp 18   Ht 1.651 m (5' 5\")   Wt 71.2 kg (157 lb)   SpO2 97%       Physical Exam   Constitutional: pt is oriented to person, place, and time. Pt appears well-developed and well-nourished. No distress.   HENT:   Head: Normocephalic and atraumatic.   Eyes: Conjunctivae are normal. No scleral icterus.   Cardiovascular: Normal rate and regular rhythm.    Pulmonary/Chest: Effort normal and breath sounds normal. No respiratory distress.        Neurological: pt is alert and oriented to person, place, and time. No cranial nerve deficit.   Skin: Skin is warm. Pt is not diaphoretic.  "     4x6cm area of erythema, warmth, tender to touch over rt upper arm.       Left hand:   large evanescent wheal over dorsum hand, but no red streaking up the arm and there is no TTP.           Nursing note and vitals reviewed.              Assessment/Plan:     1. Cellulitis of right upper extremity     - sulfamethoxazole-trimethoprim (BACTRIM DS) 800-160 MG tablet; Take 1 Tablet by mouth 2 times a day for 7 days.  Dispense: 14 Tablet; Refill: 0      2.  Hive on left hand  Zyrtc 10mg qd and topical benadryl prn  Allergy list updated    Rx epipen    Differential diagnosis, natural history, supportive care, and indications for immediate follow-up discussed. All questions answered. Patient agrees with the plan of care.     Follow-up as needed if symptoms worsen or fail to improve to PCP, Urgent care or Emergency Room.     I have personally reviewed prior external notes and test results pertinent to today's visit.  I have independently reviewed and interpreted all diagnostics ordered during this urgent care acute visit.

## 2024-10-08 NOTE — Clinical Note
Denisse Hammer is doing great. Pretty much no pain in her head and neck after the RFN    [facility] Admission

## 2024-10-09 ENCOUNTER — HOSPITAL ENCOUNTER (OUTPATIENT)
Dept: LAB | Facility: MEDICAL CENTER | Age: 79
End: 2024-10-09
Attending: FAMILY MEDICINE
Payer: MEDICARE

## 2024-10-09 DIAGNOSIS — D69.6 THROMBOCYTOPENIA (HCC): ICD-10-CM

## 2024-10-09 DIAGNOSIS — E55.9 VITAMIN D DEFICIENCY: ICD-10-CM

## 2024-10-09 DIAGNOSIS — E78.5 DYSLIPIDEMIA: ICD-10-CM

## 2024-10-09 LAB
25(OH)D3 SERPL-MCNC: 54 NG/ML (ref 30–100)
ALBUMIN SERPL BCP-MCNC: 4.3 G/DL (ref 3.2–4.9)
ALBUMIN/GLOB SERPL: 1.6 G/DL
ALP SERPL-CCNC: 106 U/L (ref 30–99)
ALT SERPL-CCNC: 16 U/L (ref 2–50)
ANION GAP SERPL CALC-SCNC: 11 MMOL/L (ref 7–16)
AST SERPL-CCNC: 20 U/L (ref 12–45)
BASOPHILS # BLD AUTO: 0.5 % (ref 0–1.8)
BASOPHILS # BLD: 0.03 K/UL (ref 0–0.12)
BILIRUB SERPL-MCNC: 0.6 MG/DL (ref 0.1–1.5)
BUN SERPL-MCNC: 21 MG/DL (ref 8–22)
CALCIUM ALBUM COR SERPL-MCNC: 9.1 MG/DL (ref 8.5–10.5)
CALCIUM SERPL-MCNC: 9.3 MG/DL (ref 8.5–10.5)
CHLORIDE SERPL-SCNC: 104 MMOL/L (ref 96–112)
CHOLEST SERPL-MCNC: 192 MG/DL (ref 100–199)
CO2 SERPL-SCNC: 24 MMOL/L (ref 20–33)
CREAT SERPL-MCNC: 0.69 MG/DL (ref 0.5–1.4)
EOSINOPHIL # BLD AUTO: 0.12 K/UL (ref 0–0.51)
EOSINOPHIL NFR BLD: 1.8 % (ref 0–6.9)
ERYTHROCYTE [DISTWIDTH] IN BLOOD BY AUTOMATED COUNT: 50.4 FL (ref 35.9–50)
FASTING STATUS PATIENT QL REPORTED: NORMAL
GFR SERPLBLD CREATININE-BSD FMLA CKD-EPI: 89 ML/MIN/1.73 M 2
GLOBULIN SER CALC-MCNC: 2.7 G/DL (ref 1.9–3.5)
GLUCOSE SERPL-MCNC: 111 MG/DL (ref 65–99)
HCT VFR BLD AUTO: 40.5 % (ref 37–47)
HDLC SERPL-MCNC: 94 MG/DL
HGB BLD-MCNC: 13.4 G/DL (ref 12–16)
IMM GRANULOCYTES # BLD AUTO: 0.02 K/UL (ref 0–0.11)
IMM GRANULOCYTES NFR BLD AUTO: 0.3 % (ref 0–0.9)
LDLC SERPL CALC-MCNC: 87 MG/DL
LYMPHOCYTES # BLD AUTO: 1.52 K/UL (ref 1–4.8)
LYMPHOCYTES NFR BLD: 23.3 % (ref 22–41)
MCH RBC QN AUTO: 32.9 PG (ref 27–33)
MCHC RBC AUTO-ENTMCNC: 33.1 G/DL (ref 32.2–35.5)
MCV RBC AUTO: 99.5 FL (ref 81.4–97.8)
MONOCYTES # BLD AUTO: 0.49 K/UL (ref 0–0.85)
MONOCYTES NFR BLD AUTO: 7.5 % (ref 0–13.4)
NEUTROPHILS # BLD AUTO: 4.35 K/UL (ref 1.82–7.42)
NEUTROPHILS NFR BLD: 66.6 % (ref 44–72)
NRBC # BLD AUTO: 0 K/UL
NRBC BLD-RTO: 0 /100 WBC (ref 0–0.2)
PLATELET # BLD AUTO: 149 K/UL (ref 164–446)
PMV BLD AUTO: 11.7 FL (ref 9–12.9)
POTASSIUM SERPL-SCNC: 4.8 MMOL/L (ref 3.6–5.5)
PROT SERPL-MCNC: 7 G/DL (ref 6–8.2)
RBC # BLD AUTO: 4.07 M/UL (ref 4.2–5.4)
SODIUM SERPL-SCNC: 139 MMOL/L (ref 135–145)
TRIGL SERPL-MCNC: 54 MG/DL (ref 0–149)
WBC # BLD AUTO: 6.5 K/UL (ref 4.8–10.8)

## 2024-10-09 PROCEDURE — 85025 COMPLETE CBC W/AUTO DIFF WBC: CPT

## 2024-10-09 PROCEDURE — 80053 COMPREHEN METABOLIC PANEL: CPT

## 2024-10-09 PROCEDURE — 80061 LIPID PANEL: CPT

## 2024-10-09 PROCEDURE — 36415 COLL VENOUS BLD VENIPUNCTURE: CPT

## 2024-10-09 PROCEDURE — 82306 VITAMIN D 25 HYDROXY: CPT

## 2024-10-15 ENCOUNTER — OFFICE VISIT (OUTPATIENT)
Dept: MEDICAL GROUP | Facility: PHYSICIAN GROUP | Age: 79
End: 2024-10-15
Payer: MEDICARE

## 2024-10-15 VITALS
HEART RATE: 77 BPM | WEIGHT: 157 LBS | DIASTOLIC BLOOD PRESSURE: 92 MMHG | HEIGHT: 64 IN | RESPIRATION RATE: 18 BRPM | SYSTOLIC BLOOD PRESSURE: 138 MMHG | TEMPERATURE: 98.1 F | BODY MASS INDEX: 26.8 KG/M2 | OXYGEN SATURATION: 95 %

## 2024-10-15 DIAGNOSIS — M25.521 RIGHT ELBOW PAIN: ICD-10-CM

## 2024-10-15 DIAGNOSIS — N95.2 POSTMENOPAUSAL ATROPHIC VAGINITIS: Chronic | ICD-10-CM

## 2024-10-15 DIAGNOSIS — D69.6 THROMBOCYTOPENIA (HCC): ICD-10-CM

## 2024-10-15 DIAGNOSIS — M50.30 DEGENERATIVE DISC DISEASE, CERVICAL: ICD-10-CM

## 2024-10-15 DIAGNOSIS — E04.2 MULTINODULAR GOITER (NONTOXIC): ICD-10-CM

## 2024-10-15 DIAGNOSIS — M25.511 CHRONIC RIGHT SHOULDER PAIN: ICD-10-CM

## 2024-10-15 DIAGNOSIS — M67.911 TENDINOPATHY OF RIGHT ROTATOR CUFF: ICD-10-CM

## 2024-10-15 DIAGNOSIS — E55.9 VITAMIN D DEFICIENCY: ICD-10-CM

## 2024-10-15 DIAGNOSIS — R73.09 ABNORMAL GLUCOSE: ICD-10-CM

## 2024-10-15 DIAGNOSIS — G89.29 CHRONIC RIGHT SHOULDER PAIN: ICD-10-CM

## 2024-10-15 DIAGNOSIS — R79.89 ELEVATED VITAMIN B12 LEVEL: ICD-10-CM

## 2024-10-15 DIAGNOSIS — Z23 NEED FOR VACCINATION: ICD-10-CM

## 2024-10-15 DIAGNOSIS — N81.10 BLADDER PROLAPSE, FEMALE, ACQUIRED: ICD-10-CM

## 2024-10-15 DIAGNOSIS — H81.10 BENIGN PAROXYSMAL POSITIONAL VERTIGO, UNSPECIFIED LATERALITY: ICD-10-CM

## 2024-10-15 DIAGNOSIS — E78.00 ELEVATED LDL CHOLESTEROL LEVEL: ICD-10-CM

## 2024-10-15 DIAGNOSIS — I73.00 RAYNAUD'S PHENOMENON WITHOUT GANGRENE: ICD-10-CM

## 2024-10-15 DIAGNOSIS — F32.1 CURRENT MODERATE EPISODE OF MAJOR DEPRESSIVE DISORDER WITHOUT PRIOR EPISODE (HCC): ICD-10-CM

## 2024-10-15 DIAGNOSIS — J30.2 SEASONAL ALLERGIES: ICD-10-CM

## 2024-10-15 PROCEDURE — 90662 IIV NO PRSV INCREASED AG IM: CPT | Performed by: FAMILY MEDICINE

## 2024-10-15 PROCEDURE — G0008 ADMIN INFLUENZA VIRUS VAC: HCPCS | Performed by: FAMILY MEDICINE

## 2024-10-15 PROCEDURE — 3075F SYST BP GE 130 - 139MM HG: CPT | Performed by: FAMILY MEDICINE

## 2024-10-15 PROCEDURE — G0439 PPPS, SUBSEQ VISIT: HCPCS | Mod: 25 | Performed by: FAMILY MEDICINE

## 2024-10-15 PROCEDURE — 3080F DIAST BP >= 90 MM HG: CPT | Performed by: FAMILY MEDICINE

## 2024-10-15 RX ORDER — MECLIZINE HYDROCHLORIDE 25 MG/1
25 TABLET ORAL 3 TIMES DAILY PRN
Qty: 30 TABLET | Refills: 0 | Status: SHIPPED | OUTPATIENT
Start: 2024-10-15

## 2024-10-15 ASSESSMENT — FIBROSIS 4 INDEX: FIB4 SCORE: 2.58

## 2024-10-15 ASSESSMENT — ACTIVITIES OF DAILY LIVING (ADL): BATHING_REQUIRES_ASSISTANCE: 0

## 2024-10-15 ASSESSMENT — ENCOUNTER SYMPTOMS: GENERAL WELL-BEING: EXCELLENT

## 2024-10-15 ASSESSMENT — PATIENT HEALTH QUESTIONNAIRE - PHQ9: CLINICAL INTERPRETATION OF PHQ2 SCORE: 0

## 2024-12-17 ENCOUNTER — TELEPHONE (OUTPATIENT)
Dept: MEDICAL GROUP | Facility: PHYSICIAN GROUP | Age: 79
End: 2024-12-17
Payer: MEDICARE

## 2024-12-17 NOTE — TELEPHONE ENCOUNTER
Pt received a due bill for labs.      I sugessted that she send it back in  asking to re bill the insurance.

## 2025-02-03 ENCOUNTER — OFFICE VISIT (OUTPATIENT)
Dept: URBAN - METROPOLITAN AREA CLINIC 47 | Facility: CLINIC | Age: 80
End: 2025-02-03
Payer: MEDICARE

## 2025-02-03 DIAGNOSIS — H35.3231 EXUDATIVE AGE-RELATED MACULAR DEGENERATION, BILATERAL, WITH ACTIVE CHOROIDAL NEOVASCULARIZATION: Primary | ICD-10-CM

## 2025-02-03 PROCEDURE — 67028 INJECTION EYE DRUG: CPT | Performed by: OPHTHALMOLOGY

## 2025-02-03 PROCEDURE — 92134 CPTRZ OPH DX IMG PST SGM RTA: CPT | Performed by: OPHTHALMOLOGY

## 2025-02-03 PROCEDURE — 99213 OFFICE O/P EST LOW 20 MIN: CPT | Performed by: OPHTHALMOLOGY

## 2025-02-03 ASSESSMENT — INTRAOCULAR PRESSURE
OS: 13
OD: 15

## 2025-05-05 PROBLEM — G89.29 HIP PAIN, CHRONIC, RIGHT: Status: ACTIVE | Noted: 2025-05-05

## 2025-05-05 PROBLEM — M25.551 HIP PAIN, CHRONIC, RIGHT: Status: ACTIVE | Noted: 2025-05-05

## 2025-05-05 PROBLEM — M16.11 PRIMARY OSTEOARTHRITIS OF RIGHT HIP: Status: ACTIVE | Noted: 2025-05-05

## 2025-05-22 NOTE — TELEPHONE ENCOUNTER
From: Yajaira Eduardo  To: Jaquelin Rojas M.D.  Sent: 4/15/2019 4:00 PM PDT  Subject: Non-Urgent Medical Question    Jaquelin,  I have been diagnosed with moderate to severe opacification of the right sphenoid sinus with findings of underlying chronic sphenoid sinusitis. Can you recommend a sinus specialist.?  Also need a podiatrist any help there’s?  Thanks,  Denisse Eduardo   HEAD: Normocephalic; atraumatic.   EYES: PERRL; EOM intact. Conjunctiva normal B/L.   ENT: Normal pharynx with no tonsillar hypertrophy. MMM.  NECK: Supple; non-tender; no cervical lymphadenopathy.   CHEST: Normal chest excursion with respiration.   CARDIOVASCULAR: Normal S1, S2; no murmurs, rubs, or gallops.   RESPIRATORY: B/L rhonchi and wheezing.   GI/: Normal bowel sounds; non-distended; non-tender.  BACK: No evidence of trauma or deformity. Non-tender to palpation. No CVA tenderness.   EXT: Normal ROM in all four extremities; non-tender to palpation; distal pulses are normal. No leg edema B/L.

## 2025-06-06 DIAGNOSIS — D69.6 THROMBOCYTOPENIA (HCC): ICD-10-CM

## 2025-06-06 DIAGNOSIS — E55.9 VITAMIN D DEFICIENCY: ICD-10-CM

## 2025-06-06 DIAGNOSIS — E78.00 ELEVATED LDL CHOLESTEROL LEVEL: Primary | ICD-10-CM

## 2025-06-06 DIAGNOSIS — E04.2 MULTINODULAR GOITER (NONTOXIC): ICD-10-CM

## 2025-06-07 ENCOUNTER — HOSPITAL ENCOUNTER (OUTPATIENT)
Dept: LAB | Facility: MEDICAL CENTER | Age: 80
End: 2025-06-07
Attending: FAMILY MEDICINE
Payer: MEDICARE

## 2025-06-07 DIAGNOSIS — E55.9 VITAMIN D DEFICIENCY: ICD-10-CM

## 2025-06-07 DIAGNOSIS — E78.00 ELEVATED LDL CHOLESTEROL LEVEL: ICD-10-CM

## 2025-06-07 DIAGNOSIS — D69.6 THROMBOCYTOPENIA (HCC): ICD-10-CM

## 2025-06-07 DIAGNOSIS — E04.2 MULTINODULAR GOITER (NONTOXIC): ICD-10-CM

## 2025-06-07 LAB
25(OH)D3 SERPL-MCNC: 53 NG/ML (ref 30–100)
ALBUMIN SERPL BCP-MCNC: 4.2 G/DL (ref 3.2–4.9)
ALBUMIN/GLOB SERPL: 1.4 G/DL
ALP SERPL-CCNC: 111 U/L (ref 30–99)
ALT SERPL-CCNC: 16 U/L (ref 2–50)
ANION GAP SERPL CALC-SCNC: 10 MMOL/L (ref 7–16)
AST SERPL-CCNC: 20 U/L (ref 12–45)
BASOPHILS # BLD AUTO: 0.6 % (ref 0–1.8)
BASOPHILS # BLD: 0.04 K/UL (ref 0–0.12)
BILIRUB SERPL-MCNC: 0.5 MG/DL (ref 0.1–1.5)
BUN SERPL-MCNC: 18 MG/DL (ref 8–22)
CALCIUM ALBUM COR SERPL-MCNC: 9 MG/DL (ref 8.5–10.5)
CALCIUM SERPL-MCNC: 9.2 MG/DL (ref 8.5–10.5)
CHLORIDE SERPL-SCNC: 105 MMOL/L (ref 96–112)
CHOLEST SERPL-MCNC: 206 MG/DL (ref 100–199)
CO2 SERPL-SCNC: 25 MMOL/L (ref 20–33)
CREAT SERPL-MCNC: 0.98 MG/DL (ref 0.5–1.4)
EOSINOPHIL # BLD AUTO: 0.13 K/UL (ref 0–0.51)
EOSINOPHIL NFR BLD: 2.1 % (ref 0–6.9)
ERYTHROCYTE [DISTWIDTH] IN BLOOD BY AUTOMATED COUNT: 50.6 FL (ref 35.9–50)
FASTING STATUS PATIENT QL REPORTED: NORMAL
GFR SERPLBLD CREATININE-BSD FMLA CKD-EPI: 59 ML/MIN/1.73 M 2
GLOBULIN SER CALC-MCNC: 2.9 G/DL (ref 1.9–3.5)
GLUCOSE SERPL-MCNC: 112 MG/DL (ref 65–99)
HCT VFR BLD AUTO: 42.1 % (ref 37–47)
HDLC SERPL-MCNC: 97 MG/DL
HGB BLD-MCNC: 13.5 G/DL (ref 12–16)
IMM GRANULOCYTES # BLD AUTO: 0.02 K/UL (ref 0–0.11)
IMM GRANULOCYTES NFR BLD AUTO: 0.3 % (ref 0–0.9)
LDLC SERPL CALC-MCNC: 95 MG/DL
LYMPHOCYTES # BLD AUTO: 1.38 K/UL (ref 1–4.8)
LYMPHOCYTES NFR BLD: 22.3 % (ref 22–41)
MCH RBC QN AUTO: 31.5 PG (ref 27–33)
MCHC RBC AUTO-ENTMCNC: 32.1 G/DL (ref 32.2–35.5)
MCV RBC AUTO: 98.4 FL (ref 81.4–97.8)
MONOCYTES # BLD AUTO: 0.45 K/UL (ref 0–0.85)
MONOCYTES NFR BLD AUTO: 7.3 % (ref 0–13.4)
NEUTROPHILS # BLD AUTO: 4.16 K/UL (ref 1.82–7.42)
NEUTROPHILS NFR BLD: 67.4 % (ref 44–72)
NRBC # BLD AUTO: 0 K/UL
NRBC BLD-RTO: 0 /100 WBC (ref 0–0.2)
PLATELET # BLD AUTO: 141 K/UL (ref 164–446)
PMV BLD AUTO: 11.2 FL (ref 9–12.9)
POTASSIUM SERPL-SCNC: 4.6 MMOL/L (ref 3.6–5.5)
PROT SERPL-MCNC: 7.1 G/DL (ref 6–8.2)
RBC # BLD AUTO: 4.28 M/UL (ref 4.2–5.4)
SODIUM SERPL-SCNC: 140 MMOL/L (ref 135–145)
TRIGL SERPL-MCNC: 70 MG/DL (ref 0–149)
TSH SERPL DL<=0.005 MIU/L-ACNC: 1.25 UIU/ML (ref 0.38–5.33)
WBC # BLD AUTO: 6.2 K/UL (ref 4.8–10.8)

## 2025-06-07 PROCEDURE — 85025 COMPLETE CBC W/AUTO DIFF WBC: CPT

## 2025-06-07 PROCEDURE — 84443 ASSAY THYROID STIM HORMONE: CPT

## 2025-06-07 PROCEDURE — 36415 COLL VENOUS BLD VENIPUNCTURE: CPT

## 2025-06-07 PROCEDURE — 82306 VITAMIN D 25 HYDROXY: CPT

## 2025-06-07 PROCEDURE — 80061 LIPID PANEL: CPT

## 2025-06-07 PROCEDURE — 80053 COMPREHEN METABOLIC PANEL: CPT

## 2025-06-11 ENCOUNTER — RESULTS FOLLOW-UP (OUTPATIENT)
Dept: INTERNAL MEDICINE | Facility: IMAGING CENTER | Age: 80
End: 2025-06-11

## 2025-06-13 ENCOUNTER — OFFICE VISIT (OUTPATIENT)
Dept: INTERNAL MEDICINE | Facility: IMAGING CENTER | Age: 80
End: 2025-06-13
Payer: MEDICARE

## 2025-06-13 VITALS
WEIGHT: 151.6 LBS | HEART RATE: 85 BPM | RESPIRATION RATE: 16 BRPM | TEMPERATURE: 98.6 F | HEIGHT: 65 IN | BODY MASS INDEX: 25.26 KG/M2 | OXYGEN SATURATION: 94 % | DIASTOLIC BLOOD PRESSURE: 80 MMHG | SYSTOLIC BLOOD PRESSURE: 142 MMHG

## 2025-06-13 DIAGNOSIS — D69.6 THROMBOCYTOPENIA (HCC): ICD-10-CM

## 2025-06-13 DIAGNOSIS — F43.20 GRIEF REACTION: Primary | ICD-10-CM

## 2025-06-13 DIAGNOSIS — M25.551 HIP PAIN, CHRONIC, RIGHT: ICD-10-CM

## 2025-06-13 DIAGNOSIS — F32.1 CURRENT MODERATE EPISODE OF MAJOR DEPRESSIVE DISORDER WITHOUT PRIOR EPISODE (HCC): ICD-10-CM

## 2025-06-13 DIAGNOSIS — M16.11 PRIMARY OSTEOARTHRITIS OF RIGHT HIP: ICD-10-CM

## 2025-06-13 DIAGNOSIS — E04.2 MULTINODULAR GOITER (NONTOXIC): ICD-10-CM

## 2025-06-13 DIAGNOSIS — G89.29 HIP PAIN, CHRONIC, RIGHT: ICD-10-CM

## 2025-06-13 PROBLEM — M25.511 RIGHT SHOULDER PAIN: Status: RESOLVED | Noted: 2022-10-12 | Resolved: 2025-06-13

## 2025-06-13 PROBLEM — M25.521 RIGHT ELBOW PAIN: Status: RESOLVED | Noted: 2018-07-16 | Resolved: 2025-06-13

## 2025-06-13 PROCEDURE — 3079F DIAST BP 80-89 MM HG: CPT | Performed by: FAMILY MEDICINE

## 2025-06-13 PROCEDURE — 99214 OFFICE O/P EST MOD 30 MIN: CPT | Performed by: FAMILY MEDICINE

## 2025-06-13 PROCEDURE — G2211 COMPLEX E/M VISIT ADD ON: HCPCS | Performed by: FAMILY MEDICINE

## 2025-06-13 PROCEDURE — 3077F SYST BP >= 140 MM HG: CPT | Performed by: FAMILY MEDICINE

## 2025-06-13 RX ORDER — DULOXETIN HYDROCHLORIDE 30 MG/1
30 CAPSULE, DELAYED RELEASE ORAL DAILY
Qty: 90 CAPSULE | Refills: 3 | Status: SHIPPED | OUTPATIENT
Start: 2025-06-13

## 2025-06-13 ASSESSMENT — FIBROSIS 4 INDEX: FIB4 SCORE: 2.77

## 2025-07-14 NOTE — PROGRESS NOTES
Verbal consent was acquired by the patient to use [email or web address]TV ambient listening note generation during this visit yes    Assessment & Plan:  Assessment & Plan  Grief reaction  Currently uncontrolled; patient feels lost and cries daily. No depression screening done today due to tearfulness and poor sleep. Patient is working through a book about grief and loss of a spouse. Not interested in therapy at this time.    Current moderate episode of major depressive disorder  Well controlled with duloxetine 30 mg daily. History of depression with improvement in emotion, thought patterns, and sleep on duloxetine. No suicidal or homicidal ideation reported.  Treatment plan: Continue duloxetine long term.    Thrombocytopenia  Chronic health problem; platelets low at 141,000. History of platelet counts ranging from 120,000 to 140,000 over the past 5 years. Blood work: white count 6.2, hemoglobin 13.5, hematocrit 42.1.  Diagnostic plan: Monitor every 6 months; further evaluation if platelet count drops precipitously.    Multinodular goiter  Chronic health problem with normal thyroid function. Thyroid testing has been normal in the past. Recent labs: fasting glucose 112, alk phos 111, GFR 59, cholesterol 206, triglycerides 70, HDL 97, LDL 95, TSH 1.250, vitamin D 53.  Diagnostic plan: Continue monitoring.    Chronic right hip pain and osteoarthritis of the right hip  Chronic problem; patient uses meloxicam or over-the-counter NSAIDs for relief. Engaging in exercises to manage pain.  Treatment plan: Continue current management and follow-up in 6 months.    Elevated blood pressure  Blood pressure today was 142/80, likely due to stress.  Diagnostic plan: Patient will check blood pressure 3 days a week for the next couple of weeks.  Treatment plan: If readings are consistently >140/90, medication will be started.    Vitamin D deficiency  Patient takes vitamin D replacement at 6000 units daily. Vitamin D level is normal at  53.  Treatment plan: Continue current regimen.    Follow-up: Follow up in 6 months or sooner if any problems arise.      Billing : secondary to the complexity of this patient's illnesses and their interactions.  See assessment and plan above for the comprehensive evaluation and management of the patient's acute and chronic concerns.  As the patient's PCP, I am the continued focal point for all health care services for the patient's needs and ongoing subsequent medical care.  All problems listed were discussed during the office visit, medications were evaluated and complexities were discussed, as well as plans for the future care of this patient.    Subjective:     HPI:   History of Present Illness  The patient is here to reestablish care. She had been in [place] last winter, where her  had a fall, suffered a bleed, and eventually passed away. She is very sad today, feeling absolutely lost. She states that she never gets through a day without crying and is having a very difficult time working through the loss of her .    Emotional Distress and Poor Sleep  She is currently grappling with the loss of her , which has led to daily tearfulness and poor sleep. She is still working through estate matters as well.  - Onset: Since the passing of her  last winter.  - Duration: Ongoing since the loss of her .  - Character: Daily tearfulness and poor sleep.  - Severity: Very sad, feeling absolutely lost, and having a very difficult time.    History of Depression  She has a history of depression that previously caused significant distress, leading to the initiation of duloxetine 30 mg daily. This medication has helped restore her emotional stability, thought patterns, and sleep quality. She wishes to continue this treatment long term.  - Onset: Previous history of significant distress.  - Character: Significant distress.  - Alleviating Factors: Duloxetine 30 mg daily.  - Severity:  "Medication has helped restore emotional stability, thought patterns, and sleep quality.    Thrombocytopenia  She also has thrombocytopenia, with platelet counts ranging from 120,000 to 140,000 over the past 5 years.  - Duration: Over the past 5 years.  - Character: Platelet counts ranging from 120,000 to 140,000.    Multinodular Goiter  Additionally, she has a multinodular goiter, which does not cause her any discomfort. Previous thyroid tests have returned normal results.  - Character: Multinodular goiter without discomfort.  - Alleviating Factors: Normal thyroid test results.    Chronic Right Hip Pain and Arthritis  She is doing some exercises to try and help with chronic right hip pain and arthritis of the right hip.  - Character: Chronic right hip pain and arthritis.  - Alleviating Factors: Doing some exercises.    She is currently taking vitamin D supplements at a dose of 6000 units daily.    Social History:  Marital Status:   Sleep: Poor sleep    Current Medications[1]     Health Maintenance: Completed    Objective:     Exam:     BP (!) 142/80 (BP Location: Left arm, Patient Position: Sitting, BP Cuff Size: Adult)   Pulse 85   Temp 37 °C (98.6 °F) (Temporal)   Resp 16   Ht 1.651 m (5' 5\")   Wt 68.8 kg (151 lb 9.6 oz)   LMP  (LMP Unknown)   SpO2 94%   BMI 25.23 kg/m²  Body mass index is 25.23 kg/m².  Physical Exam  Constitutional:       General: she is not in acute distress.     Appearance: Normal appearance. she is normal weight. she is not ill-appearing.   Cardiovascular:      Rate and Rhythm: Normal rate.      Pulses: Normal pulses.      Heart sounds: Normal heart sounds. No murmur heard.     No friction rub. No gallop.   Pulmonary:      Effort: Pulmonary effort is normal. No respiratory distress.      Breath sounds: Normal breath sounds. No wheezing, rhonchi or rales.     Psychiatric:         Mood and Affect: Mood normal.         Behavior: Behavior normal.      Results  Labs: 6/7/25   - " Complete Blood Count (CBC): White count: 6.2, Hemoglobin: 13.5, Hematocrit: 42.1, Platelets: 141,000   - Comprehensive Metabolic Panel: Fasting glucose: 112 mg/dL, Alk phos: 111 U/L, GFR: 59 mL/min   - Lipid Panel: Cholesterol: 206 mg/dL, Triglycerides: 70 mg/dL, HDL: 97 mg/dL, LDL: 95 mg/dL   - Thyroid Function Test: TSH: 1.250   - Vitamin D Level: Vitamin D: 53 ng/mL                ORDERS     1. Grief reaction (Primary)      2. Current moderate episode of major depressive disorder without prior episode (HCC)    - DULoxetine (CYMBALTA) 30 MG Cap DR Particles; Take 1 Capsule by mouth every day.  Dispense: 90 Capsule; Refill: 3    3. Thrombocytopenia (HCC)      4. Multinodular goiter (nontoxic)      5. Hip pain, chronic, right      6. Primary osteoarthritis of right hip                  Please note that this dictation was created using voice recognition software. I have made every reasonable attempt to correct obvious errors, but I expect that there are errors of grammar and possibly content that I did not discover before finalizing the note.             [1]   Current Outpatient Medications:     DULoxetine (CYMBALTA) 30 MG Cap DR Particles, Take 1 Capsule by mouth every day., Disp: 90 Capsule, Rfl: 3    Calcium Carb-Cholecalciferol 600-400 MG-UNIT Tab, Take 1 Tablet by mouth., Disp: , Rfl:     Multiple Vitamins-Minerals (CENTRUM SILVER ADULT 50+ PO), Take  by mouth., Disp: , Rfl:     Flaxseed, Linseed, (FLAXSEED OIL) 1200 MG CAPS, Take  by mouth., Disp: , Rfl:     ascorbic acid (ASCORBIC ACID) 500 MG TABS, Take 500 mg by mouth every day., Disp: , Rfl:     vitamin D (CHOLECALCIFEROL) 1000 UNIT TABS, Take 6,000 Units by mouth every day., Disp: , Rfl:

## 2025-10-03 ENCOUNTER — APPOINTMENT (OUTPATIENT)
Dept: INTERNAL MEDICINE | Facility: IMAGING CENTER | Age: 80
End: 2025-10-03
Payer: MEDICARE